# Patient Record
Sex: FEMALE | Race: WHITE | Employment: UNEMPLOYED | URBAN - NONMETROPOLITAN AREA
[De-identification: names, ages, dates, MRNs, and addresses within clinical notes are randomized per-mention and may not be internally consistent; named-entity substitution may affect disease eponyms.]

---

## 2021-09-30 ENCOUNTER — HOSPITAL ENCOUNTER (EMERGENCY)
Age: 56
Discharge: HOME | End: 2021-09-30
Payer: COMMERCIAL

## 2021-09-30 VITALS
RESPIRATION RATE: 14 BRPM | HEART RATE: 67 BPM | OXYGEN SATURATION: 97 % | DIASTOLIC BLOOD PRESSURE: 68 MMHG | TEMPERATURE: 98 F | SYSTOLIC BLOOD PRESSURE: 114 MMHG

## 2021-09-30 DIAGNOSIS — F11.20 UNCOMPLICATED OPIOID DEPENDENCE (HCC): Primary | ICD-10-CM

## 2021-09-30 PROCEDURE — 99282 EMERGENCY DEPT VISIT SF MDM: CPT

## 2021-09-30 NOTE — ED PROVIDER NOTES
past surgical history that includes Tubal ligation (-); hernia repair (-); laparoscopy (?); Wrist surgery; and cyst removal.    CURRENT MEDICATIONS       Discharge Medication List as of 2021  3:13 PM      CONTINUE these medications which have NOT CHANGED    Details   Budesonide-Formoterol Fumarate (SYMBICORT) 80-4.5 MCG/ACT AERO Inhale 2 puffs into the lungs 2 times daily. triamterene-hydrochlorothiazide (MAXZIDE) 75-50 MG per tablet Take 1 tablet by mouth daily. citalopram (CELEXA) 40 MG tablet Take 40 mg by mouth daily. ALLERGIES     has No Known Allergies. FAMILY HISTORY     She indicated that her mother is . She indicated that her father is . family history includes Cancer in her father; Emphysema in her mother; Esophageal Cancer in her father; Heart Disease in her mother. SOCIAL HISTORY       Social History     Socioeconomic History    Marital status:      Spouse name: Not on file    Number of children: Not on file    Years of education: Not on file    Highest education level: Not on file   Occupational History    Not on file   Tobacco Use    Smoking status: Current Every Day Smoker     Packs/day: 1.00   Substance and Sexual Activity    Alcohol use: Yes     Comment: rarely    Drug use: Yes     Types: Opiates      Comment: snorts    Sexual activity: Not on file   Other Topics Concern    Not on file   Social History Narrative    Not on file     Social Determinants of Health     Financial Resource Strain:     Difficulty of Paying Living Expenses:    Food Insecurity:     Worried About Running Out of Food in the Last Year:     [address] in the Last Year:    Transportation Needs:     Lack of Transportation (Medical):      Lack of Transportation (Non-Medical):    Physical Activity:     Days of Exercise per Week:     Minutes of Exercise per Session:    Stress:     Feeling of Stress :    Social Connections:     Frequency of Communication with Friends and Family:     Frequency of Social Gatherings with Friends and Family:     Attends Islam Services:     Active Member of Clubs or Organizations:     Attends Club or Organization Meetings:     Marital Status:    Intimate Partner Violence:     Fear of Current or Ex-Partner:     Emotionally Abused:     Physically Abused:     Sexually Abused:        PHYSICAL EXAM     INITIAL VITALS:  temperature is 98 °F (36.7 °C). Her blood pressure is 114/68 and her pulse is 67. Her respiration is 14 and oxygen saturation is 97%. Physical Exam  Vitals and nursing note reviewed. Constitutional:       Appearance: Normal appearance. She is well-developed. HENT:      Head: Normocephalic. Mouth/Throat:      Pharynx: Uvula midline. Eyes:      Conjunctiva/sclera: Conjunctivae normal.   Cardiovascular:      Rate and Rhythm: Normal rate and regular rhythm. Heart sounds: Normal heart sounds, S1 normal and S2 normal.   Pulmonary:      Effort: Pulmonary effort is normal. No respiratory distress. Breath sounds: Normal breath sounds. Chest:      Chest wall: No tenderness. Abdominal:      General: Bowel sounds are normal. There is no distension. Palpations: Abdomen is soft. Tenderness: There is no abdominal tenderness. Musculoskeletal:         General: Normal range of motion. Cervical back: Normal range of motion and neck supple. Lymphadenopathy:      Cervical: No cervical adenopathy. Skin:     General: Skin is warm and dry. Neurological:      Mental Status: She is alert and oriented to person, place, and time. Psychiatric:         Speech: Speech normal.         Behavior: Behavior normal.         Thought Content:  Thought content normal.         DIFFERENTIAL DIAGNOSIS:   Substance abuse, depression, chronic pain syndrome,      RADIOLOGY: non-plainfilm images(s) such as CT, Ultrasound and MRI are read by the radiologist.  Plain radiographic images are visualized and preliminarily interpreted by the emergency physician unless otherwise stated below. No orders to display         LABS:   Labs Reviewed - No data to display    EMERGENCY DEPARTMENT COURSE:   Vitals:    Vitals:    09/30/21 1416 09/30/21 1516   BP: 117/69 114/68   Pulse: 69 67   Resp: 16 14   Temp: 98.7 °F (37.1 °C) 98 °F (36.7 °C)   TempSrc: Oral    SpO2: 97%          MDM  Patient was seen and evaluated in the emergency department, patient appeared to be in no acute distress, vital signs reviewed, no significant findings noted. Physical exam was completed, no significant findings noted. I did not feel lab work or imaging was needed at this time. Patient's not suicidal, patient does not require inpatient stay. Patient's not under an [facility]. Discussed options for care with the patient and her family, they are willing to follow-up with Suboxone clinic. They are given phone number to call to make appointment. They verbalized understanding plan of care. They are advised to return the emergency department for worsening signs and symptoms. They verbalized understanding. Medications - No data to display    Patient was seenindependently by myself. The patient's final impression and disposition and plan was determined by myself. CRITICAL CARE:   None    CONSULTS:  None    PROCEDURES:  None    FINAL IMPRESSION     1.  Uncomplicated opioid dependence [facility])          DISPOSITION/PLAN   Patient discharged in stable condition  PATIENT REFERREDTO:  Kylie Cohen MD  [address] [place] [address] [id number]  [phone]    Call         DISCHARGE MEDICATIONS:  Discharge Medication List as of 9/30/2021  3:13 PM          (Please note that portions of this note were completed with a voice recognition program.  Efforts were made to edit the dictations but occasionally words are mis-transcribed.)      Provider:  I personally performed the services described in the documentation,reviewed and edited the documentation which was dictated to the scribe in my presence, and it accurately records my words and actions.     Joshua Cherry, CNP 09/30/21 4:25 PM    Harvinder Cherry, APRN - CNP        [employer], APRN - CNP  09/30/21 0273

## 2021-09-30 NOTE — ED NOTES
Lolis Warren to bedside and having extensive conversation with patient and children. Safety plan made for medications. Patient denies current SI and has no hx of wanting to harm herself. Pt given ginger ale. Denies further needs at this time.       James Bosch RN  09/30/21 [id number]

## 2021-09-30 NOTE — PROGRESS NOTES
ED provider requesting OMA to speak with patient about Dr. Lashaun Sommer office and schedule an appointment. Spoke with staff at his office who states to have patient contact Dr. Murphy Hatchet office and then they will have patient do a phone screening with Rashi Arshad who will then consult case with giselle. Spoke with pt who was not interested at this time. Phone number provided. Also provided additional resources. Updated provider.

## 2021-10-04 ENCOUNTER — CLINICAL DOCUMENTATION (OUTPATIENT)
Dept: INTERNAL MEDICINE CLINIC | Age: 56
End: 2021-10-04

## 2021-10-04 NOTE — PROGRESS NOTES
Patient's daughter called christian this morning about her mother. Patient's daughter Lyssa Carter reports that she is needing to schedule or find out what to do next for her mom because her mom will not answer her phone if she is being called to be scheduled. Lyssa Carter shares that she is her mom's care provider and has been now monitoring her mothers medication. Patient reports that her mom is starting to go into withdrawals due to her giving her the correct amount and clearly her mom has been taking more. Christian did explain that there is an office in [facility] though she needed to learn the referral process. Christian spoke to [facility], who shared that the patient needs to see Yaw Espinoza or Dr. Yesy Ansari first then can be referred to the [facility] office. Christian is going to have support staff or Elizabeth SANDERS call and have patient scheduled with Yaw Espinoza for first available appointment.

## 2021-10-06 ENCOUNTER — HOSPITAL ENCOUNTER (OUTPATIENT)
Age: 56
Discharge: HOME | End: 2021-10-06
Payer: COMMERCIAL

## 2021-10-06 ENCOUNTER — HOSPITAL ENCOUNTER (OUTPATIENT)
Dept: GENERAL RADIOLOGY | Age: 56
Discharge: HOME | End: 2021-10-06
Payer: COMMERCIAL

## 2021-10-06 ENCOUNTER — OFFICE VISIT (OUTPATIENT)
Dept: INTERNAL MEDICINE CLINIC | Age: 56
End: 2021-10-06
Payer: COMMERCIAL

## 2021-10-06 VITALS
HEIGHT: 63 IN | TEMPERATURE: 98.3 F | HEART RATE: 79 BPM | BODY MASS INDEX: 18.96 KG/M2 | DIASTOLIC BLOOD PRESSURE: 89 MMHG | WEIGHT: 107 LBS | SYSTOLIC BLOOD PRESSURE: 135 MMHG

## 2021-10-06 DIAGNOSIS — F11.20 SEVERE OPIOID USE DISORDER (HCC): Primary | ICD-10-CM

## 2021-10-06 DIAGNOSIS — M70.60 GREATER TROCHANTERIC BURSITIS, UNSPECIFIED LATERALITY: ICD-10-CM

## 2021-10-06 DIAGNOSIS — F11.20 SEVERE OPIOID USE DISORDER (HCC): ICD-10-CM

## 2021-10-06 DIAGNOSIS — M54.50 LUMBAR PAIN: ICD-10-CM

## 2021-10-06 LAB
ALCOHOL URINE: ABNORMAL
AMPHETAMINE SCREEN, URINE: ABNORMAL
BARBITURATE SCREEN, URINE: ABNORMAL
BENZODIAZEPINE SCREEN, URINE: ABNORMAL
BUPRENORPHINE URINE: ABNORMAL
COCAINE METABOLITE SCREEN URINE: ABNORMAL
FENTANYL SCREEN, URINE: ABNORMAL
GABAPENTIN SCREEN, URINE: ABNORMAL
MDMA URINE: ABNORMAL
METHADONE SCREEN, URINE: ABNORMAL
METHAMPHETAMINE, URINE: ABNORMAL
OPIATE SCREEN URINE: ABNORMAL
OXYCODONE SCREEN URINE: ABNORMAL
PHENCYCLIDINE SCREEN URINE: ABNORMAL
PROPOXYPHENE SCREEN, URINE: ABNORMAL
SYNTHETIC CANNABINOIDS(K2) SCREEN, URINE: ABNORMAL
THC SCREEN, URINE: ABNORMAL
TRAMADOL SCREEN URINE: ABNORMAL
TRICYCLIC ANTIDEPRESSANTS, UR: ABNORMAL

## 2021-10-06 PROCEDURE — 99213 OFFICE O/P EST LOW 20 MIN: CPT | Performed by: NURSE PRACTITIONER

## 2021-10-06 PROCEDURE — 80305 DRUG TEST PRSMV DIR OPT OBS: CPT | Performed by: NURSE PRACTITIONER

## 2021-10-06 PROCEDURE — 80053 COMPREHEN METABOLIC PANEL: CPT

## 2021-10-06 PROCEDURE — 72120 X-RAY BEND ONLY L-S SPINE: CPT

## 2021-10-06 PROCEDURE — 36415 COLL VENOUS BLD VENIPUNCTURE: CPT

## 2021-10-06 RX ORDER — NALOXONE HYDROCHLORIDE 4 MG/.1ML
1 SPRAY NASAL PRN
Qty: 1 EACH | Refills: 1 | Status: SHIPPED | OUTPATIENT
Start: 2021-10-06

## 2021-10-06 RX ORDER — OXYCODONE HYDROCHLORIDE AND ACETAMINOPHEN 5; 325 MG/1; MG/1
1 TABLET ORAL EVERY 4 HOURS PRN
COMMUNITY
End: 2021-10-14

## 2021-10-06 RX ORDER — GABAPENTIN 300 MG/1
300 CAPSULE ORAL 3 TIMES DAILY
COMMUNITY
End: 2022-04-13 | Stop reason: SDUPTHER

## 2021-10-06 NOTE — PROGRESS NOTES
[facility]IC[facility] MANAGEMENT  [address]  Dept: [phone]  Dept Fax: [phone]: [phone]     Visit Date:  10/6/2021    Patient:  Bella West  YOB: 1965    HPI:     Chief Complaint   Patient presents with   [place] New Patient    Drug Problem     Nik Hills presents today for medical evaluation of severe opioid use disorder. I have not seen her previously. Presents today accompanied by her children. States that she has been prescribed opiates by her PCP for several years for chronic pain issues. She has chronic back pain reportedly. Is scheduled to follow up with pain management, and have lumbar xray completed. Is unsure of if she wants to stop her pain pills cold turkey, or wean down off of them. She does realize that she is dependent on them. Feels that she does not even communicate with her family any more. Is tearful. Last pain pill taken around 8 am.     Scheduled to have some dental extractions 10/20    Urine positive for opiates, oxycodone, and THC    Discussed at length all MAT options including buprenorphine, naltrexone, and methadone. Wishes to pursue treatment with buprenorphine at this time. Discussed potential for overdose and/or precipitated withdrawal. Understanding voiced. Medications    Current Outpatient Medications:     gabapentin (NEURONTIN) 300 MG capsule, Take 300 mg by mouth 3 times daily. , Disp: , Rfl:     naloxone 4 MG/0.1ML LIQD nasal spray, 1 spray by Nasal route as needed for Opioid Reversal, Disp: 1 each, Rfl: 1    triamterene-hydrochlorothiazide (MAXZIDE) 75-50 MG per tablet, Take 1 tablet by mouth daily. , Disp: , Rfl:     citalopram (CELEXA) 20 MG tablet, Take 20 mg by mouth daily 1.5 tablet daily, Disp: , Rfl:     buprenorphine-naloxone (SUBOXONE) 8-2 MG FILM SL film, Place 1 Film under the tongue 2 times daily for 7 days. , Disp: 14 Film, Rfl: 0    The patient has No Known Allergies. Past Medical History  Claudine Pavon  has a past medical history of High blood pressure. Past Surgical History  The patient  has a past surgical history that includes Tubal ligation (4-2002); hernia repair (4-2002); laparoscopy (1994?); Wrist surgery; and cyst removal.    Family History  This patient's family history includes Cancer in her father; Emphysema in her mother; Esophageal Cancer in her father; Heart Disease in her mother. Social History  Claudine Pavon  reports that she has been smoking cigarettes. She started smoking about 41 years ago. She has a 40.00 pack-year smoking history. She has never used smokeless tobacco. She reports previous alcohol use. She reports current drug use. Drug: Opiates . Health Maintenance:    Health Maintenance   Topic Date Due    Hepatitis C screen  Never done    Pneumococcal 0-64 years Vaccine (1 of 2 - PPSV23) Never done    HIV screen  Never done    DTaP/Tdap/Td vaccine (1 - Tdap) Never done    Cervical cancer screen  Never done    Lipid screen  Never done    Colon cancer screen colonoscopy  Never done    Shingles Vaccine (1 of 2) Never done    Low dose CT lung screening  Never done    Breast cancer screen  11/21/2013    Flu vaccine (1) Never done    Potassium monitoring  10/06/2022    Creatinine monitoring  10/06/2022    COVID-19 Vaccine  Completed    Hepatitis A vaccine  Aged Out    Hepatitis B vaccine  Aged Out    Hib vaccine  Aged Out    Meningococcal (ACWY) vaccine  Aged Out       Subjective:      Review of Systems   Constitutional: Negative for chills, fatigue and fever. HENT: Positive for dental problem (pain). Negative for congestion, rhinorrhea, sinus pressure, sinus pain, sore throat, tinnitus and trouble swallowing. Eyes: Negative for photophobia and visual disturbance. Respiratory: Negative for cough, shortness of breath and wheezing.     Cardiovascular: Negative for chest pain, palpitations and Thought content normal.         Judgment: Judgment normal.         Labs Reviewed 10/6/2021:    Lab Results   Component Value Date    ALT 17 10/06/2021    AST 18 10/06/2021     10/06/2021    K 3.6 10/06/2021     10/06/2021    CREATININE 1.2 10/06/2021    BUN 27 (H) 10/06/2021    CO2 24 10/06/2021       Assessment/Plan      1. Severe opioid use disorder (HCC)    - POCT Rapid Drug Screen  - naloxone 4 MG/0.1ML LIQD nasal spray; 1 spray by Nasal route as needed for Opioid Reversal  Dispense: 1 each; Refill: 1  - HIV-1 and HIV-2 Antibodies; Future  - Hepatitis C Antibody; Future  - Comprehensive Metabolic Panel; Future  - In home induction discussed at length with patient and daughter. Her daughter is a RN. Understanding voiced. - Sent home with Suboxone 8 mg film x1.   - Encouraged to attend NA/AA meetings and/or arrange for individualized counseling services. Return in about 1 day (around 10/7/2021). Patient given educational materials - see patient instructions. Discussed use, benefit, and side effects of prescribed medications. All patient questions answered. Pt voiced understanding. Reviewed health maintenance.        Electronically signed ENMANUEL Marc - CNP on 10/6/21 at 11:04 AM EDT

## 2021-10-06 NOTE — PROGRESS NOTES
Verbal order per Kamran List CNP for urine drug screen. Positive for MOP,OXY,THC. [facility] patient Suboxone 8 mg film qty 2 to be sent home with patient from out of stock. Verified with patient. Patient was sent home with Suboxone 8 mg film qty 2 from out of stock and will be seen back in office on 10/7/21.

## 2021-10-07 ENCOUNTER — OFFICE VISIT (OUTPATIENT)
Dept: INTERNAL MEDICINE CLINIC | Age: 56
End: 2021-10-07
Payer: COMMERCIAL

## 2021-10-07 VITALS
BODY MASS INDEX: 18.89 KG/M2 | SYSTOLIC BLOOD PRESSURE: 136 MMHG | WEIGHT: 106.6 LBS | DIASTOLIC BLOOD PRESSURE: 81 MMHG | HEART RATE: 68 BPM | HEIGHT: 63 IN | TEMPERATURE: 98.1 F

## 2021-10-07 DIAGNOSIS — F11.20 SEVERE OPIOID USE DISORDER (HCC): Primary | ICD-10-CM

## 2021-10-07 LAB
ALBUMIN SERPL-MCNC: 4.5 G/DL (ref 3.5–5.1)
ALCOHOL URINE: ABNORMAL
ALP BLD-CCNC: 61 U/L (ref 38–126)
ALT SERPL-CCNC: 17 U/L (ref 11–66)
AMPHETAMINE SCREEN, URINE: ABNORMAL
ANION GAP SERPL CALCULATED.3IONS-SCNC: 14 MEQ/L (ref 8–16)
AST SERPL-CCNC: 18 U/L (ref 5–40)
BARBITURATE SCREEN, URINE: ABNORMAL
BENZODIAZEPINE SCREEN, URINE: ABNORMAL
BILIRUB SERPL-MCNC: 0.2 MG/DL (ref 0.3–1.2)
BUN BLDV-MCNC: 27 MG/DL (ref 7–22)
BUPRENORPHINE URINE: ABNORMAL
CALCIUM SERPL-MCNC: 9.3 MG/DL (ref 8.5–10.5)
CHLORIDE BLD-SCNC: 103 MEQ/L (ref 98–111)
CO2: 24 MEQ/L (ref 23–33)
COCAINE METABOLITE SCREEN URINE: ABNORMAL
CREAT SERPL-MCNC: 1.2 MG/DL (ref 0.4–1.2)
FENTANYL SCREEN, URINE: ABNORMAL
GABAPENTIN SCREEN, URINE: ABNORMAL
GFR SERPL CREATININE-BSD FRML MDRD: 46 ML/MIN/1.73M2
GLUCOSE BLD-MCNC: 126 MG/DL (ref 70–108)
MDMA URINE: ABNORMAL
METHADONE SCREEN, URINE: ABNORMAL
METHAMPHETAMINE, URINE: ABNORMAL
OPIATE SCREEN URINE: ABNORMAL
OXYCODONE SCREEN URINE: ABNORMAL
PHENCYCLIDINE SCREEN URINE: ABNORMAL
POTASSIUM SERPL-SCNC: 3.6 MEQ/L (ref 3.5–5.2)
PROPOXYPHENE SCREEN, URINE: ABNORMAL
SODIUM BLD-SCNC: 141 MEQ/L (ref 135–145)
SYNTHETIC CANNABINOIDS(K2) SCREEN, URINE: ABNORMAL
THC SCREEN, URINE: ABNORMAL
TOTAL PROTEIN: 6.6 G/DL (ref 6.1–8)
TRAMADOL SCREEN URINE: ABNORMAL
TRICYCLIC ANTIDEPRESSANTS, UR: ABNORMAL

## 2021-10-07 PROCEDURE — 99213 OFFICE O/P EST LOW 20 MIN: CPT | Performed by: NURSE PRACTITIONER

## 2021-10-07 PROCEDURE — 80305 DRUG TEST PRSMV DIR OPT OBS: CPT | Performed by: NURSE PRACTITIONER

## 2021-10-07 RX ORDER — BUPRENORPHINE AND NALOXONE 8; 2 MG/1; MG/1
1 FILM, SOLUBLE BUCCAL; SUBLINGUAL 2 TIMES DAILY
Qty: 14 FILM | Refills: 0 | Status: SHIPPED | OUTPATIENT
Start: 2021-10-07 | End: 2021-10-14 | Stop reason: SDUPTHER

## 2021-10-07 ASSESSMENT — PATIENT HEALTH QUESTIONNAIRE - PHQ9
SUM OF ALL RESPONSES TO PHQ9 QUESTIONS 1 & 2: 0
SUM OF ALL RESPONSES TO PHQ QUESTIONS 1-9: 0
2. FEELING DOWN, DEPRESSED OR HOPELESS: 0
1. LITTLE INTEREST OR PLEASURE IN DOING THINGS: 0
SUM OF ALL RESPONSES TO PHQ QUESTIONS 1-9: 0
SUM OF ALL RESPONSES TO PHQ QUESTIONS 1-9: 0

## 2021-10-07 NOTE — PROGRESS NOTES
[facility]NAGEMENT  [address]  Dept: [phone]  Dept Fax: [phone]: [phone]     Visit Date:  10/7/2021    Patient:  Jia Marion  YOB: 1965    HPI:     Chief Complaint   Patient presents with    Drug Problem     Ricci العلي presents today for medical evaluation of severe opioid use disorder. I last seen her yesterday, MAT initiated at that time. She started her suboxone this am at 0830. Took the entire 8 mg film. Last opiate at 2:00 pm yesterday. Daughter says that it was hard to arouse her this morning. She took 150 mg of trazodone last night. Urine positive for buprenorphine, opiates, and THC. Medications    Current Outpatient Medications:     gabapentin (NEURONTIN) 300 MG capsule, Take 300 mg by mouth 3 times daily. , Disp: , Rfl:     naloxone 4 MG/0.1ML LIQD nasal spray, 1 spray by Nasal route as needed for Opioid Reversal, Disp: 1 each, Rfl: 1    triamterene-hydrochlorothiazide (MAXZIDE) 75-50 MG per tablet, Take 1 tablet by mouth daily. , Disp: , Rfl:     citalopram (CELEXA) 20 MG tablet, Take 20 mg by mouth daily 1.5 tablet daily, Disp: , Rfl:     buprenorphine-naloxone (SUBOXONE) 8-2 MG FILM SL film, Place 1 Film under the tongue 2 times daily for 14 days. , Disp: 28 Film, Rfl: 0    ondansetron (ZOFRAN) 4 MG tablet, Take 1 tablet by mouth 3 times daily as needed for Nausea or Vomiting, Disp: 15 tablet, Rfl: 0    The patient has No Known Allergies. Past Medical History  Ricci العلي  has a past medical history of High blood pressure. Past Surgical History  The patient  has a past surgical history that includes Tubal ligation (4-2002); hernia repair (4-2002); laparoscopy (1994?);  Wrist surgery; and cyst removal.    Family History  This patient's family history includes Cancer in her father; Emphysema in her mother; Esophageal Cancer in her Negative for dizziness, light-headedness and headaches. Psychiatric/Behavioral: Negative for dysphoric mood and sleep disturbance. The patient is not nervous/anxious. Objective:     /81 (Site: Right Upper Arm, Position: Sitting, Cuff Size: Medium Adult)   Pulse 68   Temp 98.1 °F (36.7 °C) (Temporal)   Ht 5' 3\" (1.6 m)   Wt 106 lb 9.6 oz (48.4 kg)   BMI 18.88 kg/m²     Physical Exam  Vitals reviewed. Constitutional:       General: She is not in acute distress. Appearance: Normal appearance. She is not ill-appearing. HENT:      Head: Normocephalic and atraumatic. Right Ear: External ear normal.      Left Ear: External ear normal.      Nose: Nose normal. No congestion or rhinorrhea. Mouth/Throat:      Mouth: Mucous membranes are moist.   Eyes:      Extraocular Movements: Extraocular movements intact. Conjunctiva/sclera: Conjunctivae normal.      Pupils: Pupils are equal, round, and reactive to light. Pulmonary:      Effort: Pulmonary effort is normal. No respiratory distress. Musculoskeletal:         General: Normal range of motion. Cervical back: Normal range of motion and neck supple. Right lower leg: No edema. Left lower leg: No edema. Skin:     General: Skin is warm and dry. Neurological:      General: No focal deficit present. Mental Status: She is alert and oriented to person, place, and time. Psychiatric:         Mood and Affect: Mood normal.         Behavior: Behavior normal.         Thought Content: Thought content normal.         Judgment: Judgment normal.         Labs Reviewed 10/7/2021:    Lab Results   Component Value Date    ALT 17 10/06/2021    AST 18 10/06/2021     10/06/2021    K 3.6 10/06/2021     10/06/2021    CREATININE 1.2 10/06/2021    BUN 27 (H) 10/06/2021    CO2 24 10/06/2021       Assessment/Plan      1.  Severe opioid use disorder (HCC)    - POCT Rapid Drug Screen  - Suboxone 8 mg BID  - Narcan at home  - Labs not yet obtained  - OARRS reviewed, no discrepancies  - Encouraged to attend NA/AA meetings andd/or arrange for individualized counseling      Return in about 1 week (around 10/14/2021). Patient given educational materials - see patient instructions. Discussed use, benefit, and side effects of prescribed medications. All patient questions answered. Pt voiced understanding. Reviewed health maintenance.        Electronically signed ENMANUEL Romano - CNP on 10/7/21 at 2:51 PM EDT

## 2021-10-14 ENCOUNTER — OFFICE VISIT (OUTPATIENT)
Dept: INTERNAL MEDICINE CLINIC | Age: 56
End: 2021-10-14
Payer: COMMERCIAL

## 2021-10-14 VITALS
BODY MASS INDEX: 18.78 KG/M2 | WEIGHT: 106 LBS | SYSTOLIC BLOOD PRESSURE: 138 MMHG | HEIGHT: 63 IN | HEART RATE: 69 BPM | DIASTOLIC BLOOD PRESSURE: 78 MMHG | TEMPERATURE: 97.9 F

## 2021-10-14 DIAGNOSIS — F11.20 SEVERE OPIOID USE DISORDER (HCC): Primary | ICD-10-CM

## 2021-10-14 DIAGNOSIS — R30.0 DYSURIA: ICD-10-CM

## 2021-10-14 PROCEDURE — 80305 DRUG TEST PRSMV DIR OPT OBS: CPT | Performed by: NURSE PRACTITIONER

## 2021-10-14 PROCEDURE — 99214 OFFICE O/P EST MOD 30 MIN: CPT | Performed by: NURSE PRACTITIONER

## 2021-10-14 RX ORDER — BUPRENORPHINE AND NALOXONE 8; 2 MG/1; MG/1
1 FILM, SOLUBLE BUCCAL; SUBLINGUAL 2 TIMES DAILY
Qty: 14 FILM | Refills: 0 | Status: SHIPPED | OUTPATIENT
Start: 2021-10-14 | End: 2021-10-21 | Stop reason: SDUPTHER

## 2021-10-14 NOTE — PROGRESS NOTES
[facility]ICATION MANAGEMENT  [address]  Dept: [phone]  Dept Fax: [phone]: [phone]     Visit Date:  10/14/2021    Patient:  Kevin Meyers  YOB: 1965    HPI:     Chief Complaint   Patient presents with    Drug Problem     Renee Schwartz presents today for medical evaluation of severe opioid use disorder    I last seen her 1 week ago. She is not currently feeling well. Complains of nausea and fatigue. Her son has Justina, lives in her home; however she states that he has been in the basement since his symptoms began. Urine positive for buprenorphine and THC     Also complains of urinary retention and dysuria x3 days. Will send for UA. Medications    Current Outpatient Medications:     gabapentin (NEURONTIN) 300 MG capsule, Take 300 mg by mouth 3 times daily. , Disp: , Rfl:     naloxone 4 MG/0.1ML LIQD nasal spray, 1 spray by Nasal route as needed for Opioid Reversal, Disp: 1 each, Rfl: 1    triamterene-hydrochlorothiazide (MAXZIDE) 75-50 MG per tablet, Take 1 tablet by mouth daily. , Disp: , Rfl:     citalopram (CELEXA) 20 MG tablet, Take 20 mg by mouth daily 1.5 tablet daily, Disp: , Rfl:     buprenorphine-naloxone (SUBOXONE) 8-2 MG FILM SL film, Place 1 Film under the tongue 2 times daily for 14 days. , Disp: 28 Film, Rfl: 0    ondansetron (ZOFRAN) 4 MG tablet, Take 1 tablet by mouth 3 times daily as needed for Nausea or Vomiting, Disp: 15 tablet, Rfl: 0    The patient has No Known Allergies. Past Medical History  Renee Schwartz  has a past medical history of High blood pressure. Past Surgical History  The patient  has a past surgical history that includes Tubal ligation (4-2002); hernia repair (4-2002); laparoscopy (1994?);  Wrist surgery; and cyst removal.    Family History  This patient's family history includes Cancer in her father; Emphysema in her mother; Esophageal Cancer in her father; Heart Disease in her mother. Social History  Loyd Espitia  reports that she has been smoking cigarettes. She started smoking about 41 years ago. She has a 40.00 pack-year smoking history. She has never used smokeless tobacco. She reports previous alcohol use. She reports current drug use. Drug: Opiates . Health Maintenance:    Health Maintenance   Topic Date Due    Hepatitis C screen  Never done    Pneumococcal 0-64 years Vaccine (1 of 2 - PPSV23) Never done    HIV screen  Never done    DTaP/Tdap/Td vaccine (1 - Tdap) Never done    Cervical cancer screen  Never done    Lipid screen  Never done    Colon cancer screen colonoscopy  Never done    Shingles Vaccine (1 of 2) Never done    Low dose CT lung screening  Never done    Breast cancer screen  11/21/2013    Flu vaccine (1) Never done    Potassium monitoring  10/06/2022    Creatinine monitoring  10/06/2022    COVID-19 Vaccine  Completed    Hepatitis A vaccine  Aged Out    Hepatitis B vaccine  Aged Out    Hib vaccine  Aged Out    Meningococcal (ACWY) vaccine  Aged Out       Subjective:      Review of Systems   Constitutional: Negative for chills, fatigue and fever. HENT: Positive for dental problem (pain). Negative for congestion, rhinorrhea, sinus pressure, sinus pain, sore throat, tinnitus and trouble swallowing. Eyes: Negative for photophobia and visual disturbance. Respiratory: Negative for cough, shortness of breath and wheezing. Cardiovascular: Negative for chest pain, palpitations and leg swelling. Gastrointestinal: Negative for abdominal distention, abdominal pain, blood in stool, constipation, diarrhea, nausea and vomiting. Endocrine: Negative for polydipsia, polyphagia and polyuria. Genitourinary: Positive for dysuria. Negative for difficulty urinating, frequency, hematuria and urgency. Musculoskeletal: Positive for back pain (chronic). Negative for arthralgias and myalgias.    Skin: Negative for rash and wound. Neurological: Negative for dizziness, light-headedness and headaches. Psychiatric/Behavioral: Negative for dysphoric mood and sleep disturbance. The patient is not nervous/anxious. Objective:     /78 (Site: Left Lower Arm, Position: Sitting, Cuff Size: Medium Adult)   Pulse 69   Temp 97.9 °F (36.6 °C) (Temporal)   Ht 5' 3\" (1.6 m)   Wt 106 lb (48.1 kg)   BMI 18.78 kg/m²     Physical Exam  Vitals reviewed. Constitutional:       General: She is not in acute distress. Appearance: Normal appearance. She is not ill-appearing. HENT:      Head: Normocephalic and atraumatic. Right Ear: External ear normal.      Left Ear: External ear normal.      Nose: Nose normal. No congestion or rhinorrhea. Mouth/Throat:      Mouth: Mucous membranes are moist.   Eyes:      Extraocular Movements: Extraocular movements intact. Conjunctiva/sclera: Conjunctivae normal.      Pupils: Pupils are equal, round, and reactive to light. Pulmonary:      Effort: Pulmonary effort is normal. No respiratory distress. Musculoskeletal:         General: Normal range of motion. Cervical back: Normal range of motion and neck supple. Right lower leg: No edema. Left lower leg: No edema. Skin:     General: Skin is warm and dry. Neurological:      General: No focal deficit present. Mental Status: She is alert and oriented to person, place, and time. Psychiatric:         Mood and Affect: Mood normal.         Behavior: Behavior normal.         Thought Content: Thought content normal.         Judgment: Judgment normal.         Labs Reviewed 10/14/2021:    Lab Results   Component Value Date    ALT 17 10/06/2021    AST 18 10/06/2021     10/06/2021    K 3.6 10/06/2021     10/06/2021    CREATININE 1.2 10/06/2021    BUN 27 (H) 10/06/2021    CO2 24 10/06/2021       Assessment/Plan      1.  Severe opioid use disorder (HCC)    - POCT Rapid Drug Screen  - Continue Suboxone 8 mg BID  - Narcan at home  - Labs not yet obtained  - OARRS reviewed, no discrepancies  - Encouraged to attend NA/AA meetings andd/or arrange for individualized counseling    2. Dysuria    - Urinalysis Reflex to Culture; Future      Return in about 1 week (around 10/21/2021). Patient given educational materials - see patient instructions. Discussed use, benefit, and side effects of prescribed medications. All patient questions answered. Pt voiced understanding. Reviewed health maintenance.        Electronically signed ENMANUEL Vásquez CNP on 10/14/21 at 1:15 PM EDT

## 2021-10-20 ASSESSMENT — ENCOUNTER SYMPTOMS
SORE THROAT: 0
ABDOMINAL DISTENTION: 0
SINUS PAIN: 0
DIARRHEA: 0
NAUSEA: 0
TROUBLE SWALLOWING: 0
RHINORRHEA: 0
PHOTOPHOBIA: 0
CONSTIPATION: 0
SHORTNESS OF BREATH: 0
VOMITING: 0
COUGH: 0
SINUS PRESSURE: 0
BACK PAIN: 1
BLOOD IN STOOL: 0
ABDOMINAL PAIN: 0
WHEEZING: 0

## 2021-10-21 ENCOUNTER — VIRTUAL VISIT (OUTPATIENT)
Dept: INTERNAL MEDICINE CLINIC | Age: 56
End: 2021-10-21
Payer: COMMERCIAL

## 2021-10-21 DIAGNOSIS — R11.0 NAUSEA: ICD-10-CM

## 2021-10-21 DIAGNOSIS — F11.20 SEVERE OPIOID USE DISORDER (HCC): Primary | ICD-10-CM

## 2021-10-21 PROCEDURE — 99213 OFFICE O/P EST LOW 20 MIN: CPT | Performed by: NURSE PRACTITIONER

## 2021-10-21 RX ORDER — ONDANSETRON 4 MG/1
4 TABLET, FILM COATED ORAL 3 TIMES DAILY PRN
Qty: 15 TABLET | Refills: 0 | Status: SHIPPED | OUTPATIENT
Start: 2021-10-21 | End: 2021-11-04 | Stop reason: SDUPTHER

## 2021-10-21 RX ORDER — BUPRENORPHINE AND NALOXONE 8; 2 MG/1; MG/1
1 FILM, SOLUBLE BUCCAL; SUBLINGUAL 2 TIMES DAILY
Qty: 28 FILM | Refills: 0 | Status: SHIPPED | OUTPATIENT
Start: 2021-10-21 | End: 2021-11-04 | Stop reason: SDUPTHER

## 2021-10-21 RX ORDER — BUPRENORPHINE AND NALOXONE 8; 2 MG/1; MG/1
1 FILM, SOLUBLE BUCCAL; SUBLINGUAL 2 TIMES DAILY
Qty: 14 FILM | Refills: 0 | Status: CANCELLED | OUTPATIENT
Start: 2021-10-21 | End: 2021-10-28

## 2021-10-21 NOTE — PROGRESS NOTES
[facility] INTERNAL MEDICINE AND MEDICATION MANAGEMENT  [address]  Dept: [phone]  Dept Fax: [phone]: [phone]     Visit Date:  10/21/2021    Patient:  Mandy Prabhakar  YOB: 1965    HPI:     Chief Complaint   Patient presents with    Drug Problem     Bill Goldman presents today for medical evaluation of severe opioid use disorder and nausea     I last seen her 1 week ago. Was scheduled today for a virtual visit but she did not understand how it worked, so she came into the office.     She is tearful. In pain, had multiple dental extractions yesterday. Is unable to urinate. Denies any use. States that she found a bottle of pain pills and did not take any, gave them to her  to dispose of. Is nauseated. Urges and cravings controlled with Suboxone 8 mg BID    PCP has discharged patient from practice    Stopped her trazodone, states it leaves her feeling too tired and groggy the following day. Medications    Current Outpatient Medications:     gabapentin (NEURONTIN) 300 MG capsule, Take 300 mg by mouth 3 times daily. , Disp: , Rfl:     naloxone 4 MG/0.1ML LIQD nasal spray, 1 spray by Nasal route as needed for Opioid Reversal, Disp: 1 each, Rfl: 1    triamterene-hydrochlorothiazide (MAXZIDE) 75-50 MG per tablet, Take 1 tablet by mouth daily. , Disp: , Rfl:     citalopram (CELEXA) 20 MG tablet, Take 20 mg by mouth daily 1.5 tablet daily, Disp: , Rfl:     buprenorphine-naloxone (SUBOXONE) 8-2 MG FILM SL film, Place 1 Film under the tongue 2 times daily for 14 days. , Disp: 28 Film, Rfl: 0    ondansetron (ZOFRAN) 4 MG tablet, Take 1 tablet by mouth 3 times daily as needed for Nausea or Vomiting, Disp: 30 tablet, Rfl: 0    The patient has No Known Allergies. Past Medical History  Bill Jones  has a past medical history of High blood pressure.     Past Surgical History  The patient  has a past surgical history that includes Tubal ligation (4-2002); hernia repair (4-2002); laparoscopy (1994?); Wrist surgery; and cyst removal.    Family History  This patient's family history includes Cancer in her father; Emphysema in her mother; Esophageal Cancer in her father; Heart Disease in her mother. Social History  Charolet Hamman  reports that she has been smoking cigarettes. She started smoking about 41 years ago. She has a 40.00 pack-year smoking history. She has never used smokeless tobacco. She reports previous alcohol use. She reports current drug use. Drug: Opiates . Health Maintenance:    Health Maintenance   Topic Date Due    Hepatitis C screen  Never done    Pneumococcal 0-64 years Vaccine (1 of 2 - PPSV23) Never done    COVID-19 Vaccine (1) Never done    HIV screen  Never done    DTaP/Tdap/Td vaccine (1 - Tdap) Never done    Cervical cancer screen  Never done    Lipid screen  Never done    Colon cancer screen colonoscopy  Never done    Shingles Vaccine (1 of 2) Never done    Low dose CT lung screening  Never done    Breast cancer screen  11/21/2013    Flu vaccine (1) Never done    Potassium monitoring  10/06/2022    Creatinine monitoring  10/06/2022    Hepatitis A vaccine  Aged Out    Hepatitis B vaccine  Aged Out    Hib vaccine  Aged Out    Meningococcal (ACWY) vaccine  Aged Out       Subjective:      Review of Systems   Constitutional: Negative for chills, fatigue and fever. HENT: Positive for dental problem (pain). Negative for congestion, rhinorrhea, sinus pressure, sinus pain, sore throat, tinnitus and trouble swallowing. Eyes: Negative for photophobia and visual disturbance. Respiratory: Negative for cough, shortness of breath and wheezing. Cardiovascular: Negative for chest pain, palpitations and leg swelling. Gastrointestinal: Negative for abdominal distention, abdominal pain, blood in stool, constipation, diarrhea, nausea and vomiting.    Endocrine: Negative for polydipsia, polyphagia and polyuria. Genitourinary: Negative for difficulty urinating, dysuria, frequency, hematuria and urgency. Musculoskeletal: Positive for back pain (chronic). Negative for arthralgias and myalgias. Skin: Negative for rash and wound. Neurological: Negative for dizziness, light-headedness and headaches. Psychiatric/Behavioral: Negative for dysphoric mood and sleep disturbance. The patient is not nervous/anxious. Objective: There were no vitals taken for this visit. Physical Exam  Vitals reviewed. Constitutional:       General: She is not in acute distress. Appearance: Normal appearance. She is not ill-appearing. HENT:      Head: Normocephalic and atraumatic. Right Ear: External ear normal.      Left Ear: External ear normal.      Nose: Nose normal. No congestion or rhinorrhea. Mouth/Throat:      Mouth: Mucous membranes are moist.   Eyes:      Extraocular Movements: Extraocular movements intact. Conjunctiva/sclera: Conjunctivae normal.      Pupils: Pupils are equal, round, and reactive to light. Pulmonary:      Effort: Pulmonary effort is normal. No respiratory distress. Musculoskeletal:         General: Normal range of motion. Cervical back: Normal range of motion and neck supple. Right lower leg: No edema. Left lower leg: No edema. Skin:     General: Skin is warm and dry. Neurological:      General: No focal deficit present. Mental Status: She is alert and oriented to person, place, and time. Psychiatric:         Mood and Affect: Mood normal. Affect is tearful. Behavior: Behavior normal.         Thought Content:  Thought content normal.         Judgment: Judgment normal.         Labs Reviewed 10/21/2021:    Lab Results   Component Value Date    ALT 17 10/06/2021    AST 18 10/06/2021     10/06/2021    K 3.6 10/06/2021     10/06/2021    CREATININE 1.2 10/06/2021    BUN 27 (H) 10/06/2021    CO2 24 10/06/2021       Assessment/Plan      1. Severe opioid use disorder (HCC)    - Continue Suboxone 8 mg BID  - Narcan at home  - Labs not yet obtained  - OARRS reviewed, no discrepancies  - Encouraged to attend NA/AA meetings andd/or arrange for individualized counseling    2. Nausea    - Zofran 4 mg TID PRN       Return in about 2 weeks (around 11/4/2021). Patient given educational materials - see patient instructions. Discussed use, benefit, and side effects of prescribed medications. All patient questions answered. Pt voiced understanding. Reviewed health maintenance.        Electronically signed ENMANUEL Anne - CNP on 11/11/21 at 10:20 AM EST

## 2021-10-25 ASSESSMENT — ENCOUNTER SYMPTOMS
BACK PAIN: 1
SHORTNESS OF BREATH: 0
WHEEZING: 0
SORE THROAT: 0
VOMITING: 0
ABDOMINAL PAIN: 0
ABDOMINAL DISTENTION: 0
DIARRHEA: 0
SINUS PAIN: 0
CONSTIPATION: 0
BLOOD IN STOOL: 0
COUGH: 0
NAUSEA: 0
TROUBLE SWALLOWING: 0
PHOTOPHOBIA: 0
RHINORRHEA: 0
SINUS PRESSURE: 0

## 2021-10-31 ASSESSMENT — ENCOUNTER SYMPTOMS
ABDOMINAL PAIN: 0
DIARRHEA: 0
BACK PAIN: 1
SINUS PRESSURE: 0
NAUSEA: 0
WHEEZING: 0
CONSTIPATION: 0
TROUBLE SWALLOWING: 0
VOMITING: 0
SHORTNESS OF BREATH: 0
ABDOMINAL DISTENTION: 0
BLOOD IN STOOL: 0
RHINORRHEA: 0
PHOTOPHOBIA: 0
COUGH: 0
SINUS PAIN: 0
SORE THROAT: 0

## 2021-11-04 ENCOUNTER — OFFICE VISIT (OUTPATIENT)
Dept: INTERNAL MEDICINE CLINIC | Age: 56
End: 2021-11-04
Payer: COMMERCIAL

## 2021-11-04 VITALS
DIASTOLIC BLOOD PRESSURE: 60 MMHG | BODY MASS INDEX: 18.25 KG/M2 | HEIGHT: 63 IN | SYSTOLIC BLOOD PRESSURE: 119 MMHG | WEIGHT: 103 LBS | HEART RATE: 79 BPM

## 2021-11-04 DIAGNOSIS — F11.20 SEVERE OPIOID USE DISORDER (HCC): Primary | ICD-10-CM

## 2021-11-04 DIAGNOSIS — R53.83 FATIGUE, UNSPECIFIED TYPE: ICD-10-CM

## 2021-11-04 PROCEDURE — 80305 DRUG TEST PRSMV DIR OPT OBS: CPT | Performed by: NURSE PRACTITIONER

## 2021-11-04 PROCEDURE — 99214 OFFICE O/P EST MOD 30 MIN: CPT | Performed by: NURSE PRACTITIONER

## 2021-11-04 RX ORDER — BUPRENORPHINE AND NALOXONE 8; 2 MG/1; MG/1
1 FILM, SOLUBLE BUCCAL; SUBLINGUAL 2 TIMES DAILY
Qty: 28 FILM | Refills: 0 | Status: SHIPPED | OUTPATIENT
Start: 2021-11-04 | End: 2021-11-17 | Stop reason: SDUPTHER

## 2021-11-04 RX ORDER — ONDANSETRON 4 MG/1
4 TABLET, FILM COATED ORAL 3 TIMES DAILY PRN
Qty: 30 TABLET | Refills: 0 | Status: SHIPPED | OUTPATIENT
Start: 2021-11-04 | End: 2021-12-09

## 2021-11-04 NOTE — PROGRESS NOTES
[facility] INTERNAL MEDICINE AND MEDICATION MANAGEMENT  [address]  Dept: [phone]  Dept Fax: [phone]: [phone]     Visit Date:  11/4/2021    Patient:  Joi Toro  YOB: 1965    HPI:     Chief Complaint   Patient presents with    Drug Problem     Rosita Pearson presents today for medical evaluation of severe opioid use disorder and fatigue     I last seen her 2 weeks ago. Denies any use.      Urges and cravings controlled with Suboxone 8 mg BID     PCP has discharged patient from practice     Stopped her trazodone, states it leaves her feeling too tired and groggy the following day. She continues to have significant fatigue. States that she was diagnosed with COPD > 10 years ago. She has not had repeat PFTs or sleep study. Medications    Current Outpatient Medications:     ondansetron (ZOFRAN) 4 MG tablet, Take 1 tablet by mouth 3 times daily as needed for Nausea or Vomiting, Disp: 30 tablet, Rfl: 0    gabapentin (NEURONTIN) 300 MG capsule, Take 300 mg by mouth 3 times daily. , Disp: , Rfl:     naloxone 4 MG/0.1ML LIQD nasal spray, 1 spray by Nasal route as needed for Opioid Reversal, Disp: 1 each, Rfl: 1    triamterene-hydrochlorothiazide (MAXZIDE) 75-50 MG per tablet, Take 1 tablet by mouth daily. , Disp: , Rfl:     buprenorphine-naloxone (SUBOXONE) 8-2 MG FILM SL film, Place 1 Film under the tongue 2 times daily for 14 days. , Disp: 28 Film, Rfl: 0    citalopram (CELEXA) 40 MG tablet, Take 1 tablet by mouth daily, Disp: 30 tablet, Rfl: 0    The patient has No Known Allergies. Past Medical History  Rosita Pearson  has a past medical history of High blood pressure. Past Surgical History  The patient  has a past surgical history that includes Tubal ligation (4-2002); hernia repair (4-2002); laparoscopy (1994?);  Wrist surgery; and cyst removal.    Family History  This patient's family history includes Cancer in her father; Emphysema in her mother; Esophageal Cancer in her father; Heart Disease in her mother. Social History  Ricci العلي  reports that she has been smoking cigarettes. She started smoking about 41 years ago. She has a 40.00 pack-year smoking history. She has never used smokeless tobacco. She reports previous alcohol use. She reports current drug use. Drug: Opiates . Health Maintenance:    Health Maintenance   Topic Date Due    Hepatitis C screen  Never done    Pneumococcal 0-64 years Vaccine (1 of 2 - PPSV23) Never done    COVID-19 Vaccine (1) Never done    HIV screen  Never done    DTaP/Tdap/Td vaccine (1 - Tdap) Never done    Cervical cancer screen  Never done    Lipid screen  Never done    Colon cancer screen colonoscopy  Never done    Shingles Vaccine (1 of 2) Never done    Low dose CT lung screening  Never done    Breast cancer screen  11/21/2013    Flu vaccine (1) Never done    Potassium monitoring  10/06/2022    Creatinine monitoring  10/06/2022    Hepatitis A vaccine  Aged Out    Hepatitis B vaccine  Aged Out    Hib vaccine  Aged Out    Meningococcal (ACWY) vaccine  Aged Out       Subjective:      Review of Systems   Constitutional: Positive for fatigue. Negative for chills and fever. HENT: Negative for congestion, dental problem, rhinorrhea, sinus pressure, sinus pain, sore throat, tinnitus and trouble swallowing. Eyes: Negative for photophobia and visual disturbance. Respiratory: Positive for shortness of breath (on exertion). Negative for cough and wheezing. Cardiovascular: Negative for chest pain, palpitations and leg swelling. Gastrointestinal: Negative for abdominal distention, abdominal pain, blood in stool, constipation, diarrhea, nausea and vomiting. Endocrine: Negative for polydipsia, polyphagia and polyuria. Genitourinary: Negative for difficulty urinating, dysuria, frequency, hematuria and urgency.    Musculoskeletal: Positive for back pain (chronic). Negative for arthralgias and myalgias. Skin: Negative for rash and wound. Neurological: Negative for dizziness, light-headedness and headaches. Psychiatric/Behavioral: Negative for dysphoric mood and sleep disturbance. The patient is not nervous/anxious. Objective:     /60 (Site: Left Lower Arm, Position: Sitting, Cuff Size: Medium Adult)   Pulse 79   Ht 5' 3\" (1.6 m)   Wt 103 lb (46.7 kg)   BMI 18.25 kg/m²     Physical Exam  Vitals reviewed. Constitutional:       General: She is not in acute distress. Appearance: Normal appearance. She is not ill-appearing. HENT:      Head: Normocephalic and atraumatic. Right Ear: External ear normal.      Left Ear: External ear normal.      Nose: Nose normal. No congestion or rhinorrhea. Mouth/Throat:      Mouth: Mucous membranes are moist.   Eyes:      Extraocular Movements: Extraocular movements intact. Conjunctiva/sclera: Conjunctivae normal.      Pupils: Pupils are equal, round, and reactive to light. Pulmonary:      Effort: Pulmonary effort is normal. No respiratory distress. Musculoskeletal:         General: Normal range of motion. Cervical back: Normal range of motion and neck supple. Right lower leg: No edema. Left lower leg: No edema. Skin:     General: Skin is warm and dry. Neurological:      General: No focal deficit present. Mental Status: She is alert and oriented to person, place, and time. Psychiatric:         Mood and Affect: Mood normal.         Behavior: Behavior normal.         Thought Content: Thought content normal.         Judgment: Judgment normal.         Labs Reviewed 11/4/2021:    Lab Results   Component Value Date    ALT 17 10/06/2021    AST 18 10/06/2021     10/06/2021    K 3.6 10/06/2021     10/06/2021    CREATININE 1.2 10/06/2021    BUN 27 (H) 10/06/2021    CO2 24 10/06/2021       Assessment/Plan      1.  Severe opioid use disorder (HCC)    - POCT Rapid Drug Screen  - Continue Suboxone 8 mg BID  - Narcan at home  - Labs not yet obtained  - OARRS reviewed, no discrepancies  - Encouraged to attend NA/AA meetings and/or arrange for individualized counseling    2. Fatigue, unspecified type    - Hepatic Function Panel; Future  - Iron Binding Capacity; Future  - Iron Saturation; Future  - Ferritin; Future  - CBC; Future  - Basic Metabolic Panel; Future  - Vitamin D 25 Hydroxy; Future  - TSH With Reflex Ft4; Future  - Lipid Panel; Future      Return in about 2 weeks (around 11/18/2021). Patient given educational materials - see patient instructions. Discussed use, benefit, and side effects of prescribed medications. All patient questions answered. Pt voiced understanding. Reviewed health maintenance.        Electronically signed ENMANUEL Miranda - CNP on 11/4/21 at 1:39 PM EDT

## 2021-11-11 ASSESSMENT — ENCOUNTER SYMPTOMS
PHOTOPHOBIA: 0
BACK PAIN: 1
NAUSEA: 0
SHORTNESS OF BREATH: 0
CONSTIPATION: 0
ABDOMINAL DISTENTION: 0
WHEEZING: 0
COUGH: 0
DIARRHEA: 0
SINUS PRESSURE: 0
BLOOD IN STOOL: 0
SINUS PAIN: 0
ABDOMINAL PAIN: 0
VOMITING: 0
SORE THROAT: 0
TROUBLE SWALLOWING: 0
RHINORRHEA: 0

## 2021-11-17 ENCOUNTER — OFFICE VISIT (OUTPATIENT)
Dept: INTERNAL MEDICINE CLINIC | Age: 56
End: 2021-11-17
Payer: COMMERCIAL

## 2021-11-17 VITALS
HEART RATE: 79 BPM | DIASTOLIC BLOOD PRESSURE: 60 MMHG | BODY MASS INDEX: 17.89 KG/M2 | WEIGHT: 101 LBS | SYSTOLIC BLOOD PRESSURE: 103 MMHG | HEIGHT: 63 IN | TEMPERATURE: 98.4 F

## 2021-11-17 DIAGNOSIS — F32.A ANXIETY AND DEPRESSION: ICD-10-CM

## 2021-11-17 DIAGNOSIS — F11.20 SEVERE OPIOID USE DISORDER (HCC): Primary | ICD-10-CM

## 2021-11-17 DIAGNOSIS — R53.83 FATIGUE, UNSPECIFIED TYPE: ICD-10-CM

## 2021-11-17 DIAGNOSIS — F41.9 ANXIETY AND DEPRESSION: ICD-10-CM

## 2021-11-17 PROCEDURE — 99214 OFFICE O/P EST MOD 30 MIN: CPT | Performed by: NURSE PRACTITIONER

## 2021-11-17 PROCEDURE — 80305 DRUG TEST PRSMV DIR OPT OBS: CPT | Performed by: NURSE PRACTITIONER

## 2021-11-17 RX ORDER — CITALOPRAM 40 MG/1
40 TABLET ORAL DAILY
Qty: 30 TABLET | Refills: 0 | Status: SHIPPED | OUTPATIENT
Start: 2021-11-17 | End: 2021-12-08

## 2021-11-17 RX ORDER — BUPRENORPHINE AND NALOXONE 8; 2 MG/1; MG/1
1 FILM, SOLUBLE BUCCAL; SUBLINGUAL 2 TIMES DAILY
Qty: 28 FILM | Refills: 0 | Status: SHIPPED | OUTPATIENT
Start: 2021-11-17 | End: 2021-12-01 | Stop reason: SDUPTHER

## 2021-11-17 NOTE — PROGRESS NOTES
[address] INTERNAL MEDICINE AND MEDICATION MANAGEMENT  [facility] [address]  Dept: [phone]  Dept Fax: [phone]: [phone]     Visit Date:  11/17/2021    Patient:  Prince Quigley  YOB: 1965    HPI:     Chief Complaint   Patient presents with    Drug Problem     Chao Matthews presents today for medical evaluation of severe opioid use disorder, fatigue, and mood disorder     I last seen her 2 weeks ago.     Denies any use.      Urges and cravings controlled with Suboxone 8 mg BID     PCP has discharged patient from practice     Stopped her trazodone, states it leaves her feeling too tired and groggy the following day. She continues to have significant fatigue.     States that she was diagnosed with COPD > 10 years ago. She has not had repeat PFTs or sleep study. Patient state that her mood has remained unstable. She feels increased anxiety and depression. Is on celexa, was effective initially. Will increase dose. Labs ordered for fatigue, not yet completed. Medications    Current Outpatient Medications:     buprenorphine-naloxone (SUBOXONE) 8-2 MG FILM SL film, Place 1 Film under the tongue 2 times daily for 14 days. , Disp: 28 Film, Rfl: 0    citalopram (CELEXA) 40 MG tablet, Take 1 tablet by mouth daily, Disp: 30 tablet, Rfl: 0    ondansetron (ZOFRAN) 4 MG tablet, Take 1 tablet by mouth 3 times daily as needed for Nausea or Vomiting, Disp: 30 tablet, Rfl: 0    gabapentin (NEURONTIN) 300 MG capsule, Take 300 mg by mouth 3 times daily. , Disp: , Rfl:     naloxone 4 MG/0.1ML LIQD nasal spray, 1 spray by Nasal route as needed for Opioid Reversal, Disp: 1 each, Rfl: 1    triamterene-hydrochlorothiazide (MAXZIDE) 75-50 MG per tablet, Take 1 tablet by mouth daily. , Disp: , Rfl:     The patient has No Known Allergies. Past Medical History  Chao Matthews  has a past medical history of High blood pressure.     Past Surgical History  The patient  has a past surgical history that includes Tubal ligation (4-2002); hernia repair (4-2002); laparoscopy (1994?); Wrist surgery; and cyst removal.    Family History  This patient's family history includes Cancer in her father; Emphysema in her mother; Esophageal Cancer in her father; Heart Disease in her mother. Social History  Loyd Espitia  reports that she has been smoking cigarettes. She started smoking about 41 years ago. She has a 40.00 pack-year smoking history. She has never used smokeless tobacco. She reports previous alcohol use. She reports current drug use. Drug: Opiates . Health Maintenance:    Health Maintenance   Topic Date Due    Hepatitis C screen  Never done    Pneumococcal 0-64 years Vaccine (1 of 2 - PPSV23) Never done    COVID-19 Vaccine (1) Never done    HIV screen  Never done    DTaP/Tdap/Td vaccine (1 - Tdap) Never done    Cervical cancer screen  Never done    Lipid screen  Never done    Colon cancer screen colonoscopy  Never done    Shingles Vaccine (1 of 2) Never done    Low dose CT lung screening  Never done    Breast cancer screen  11/21/2013    Flu vaccine (1) Never done    Potassium monitoring  10/06/2022    Creatinine monitoring  10/06/2022    Hepatitis A vaccine  Aged Out    Hepatitis B vaccine  Aged Out    Hib vaccine  Aged Out    Meningococcal (ACWY) vaccine  Aged Out       Subjective:      Review of Systems   Constitutional: Positive for fatigue. Negative for chills and fever. HENT: Negative for congestion, dental problem, rhinorrhea, sinus pressure, sinus pain, sore throat, tinnitus and trouble swallowing. Eyes: Negative for photophobia and visual disturbance. Respiratory: Positive for shortness of breath (on exertion). Negative for cough and wheezing. Cardiovascular: Negative for chest pain, palpitations and leg swelling.    Gastrointestinal: Negative for abdominal distention, abdominal pain, blood in stool, constipation, diarrhea, nausea and vomiting. Endocrine: Negative for polydipsia, polyphagia and polyuria. Genitourinary: Negative for difficulty urinating, dysuria, frequency, hematuria and urgency. Musculoskeletal: Positive for back pain (chronic). Negative for arthralgias and myalgias. Skin: Negative for rash and wound. Neurological: Negative for dizziness, light-headedness and headaches. Psychiatric/Behavioral: Positive for dysphoric mood. Negative for sleep disturbance. The patient is nervous/anxious. Objective:     /60 (Site: Right Lower Arm, Position: Sitting, Cuff Size: Medium Adult)   Pulse 79   Temp 98.4 °F (36.9 °C) (Temporal)   Ht 5' 3\" (1.6 m)   Wt 101 lb (45.8 kg)   BMI 17.89 kg/m²     Physical Exam  Vitals reviewed. Constitutional:       General: She is not in acute distress. Appearance: Normal appearance. She is not ill-appearing. HENT:      Head: Normocephalic and atraumatic. Right Ear: External ear normal.      Left Ear: External ear normal.      Nose: Nose normal. No congestion or rhinorrhea. Mouth/Throat:      Mouth: Mucous membranes are moist.   Eyes:      Extraocular Movements: Extraocular movements intact. Conjunctiva/sclera: Conjunctivae normal.      Pupils: Pupils are equal, round, and reactive to light. Pulmonary:      Effort: Pulmonary effort is normal. No respiratory distress. Musculoskeletal:         General: Normal range of motion. Cervical back: Normal range of motion and neck supple. Right lower leg: No edema. Left lower leg: No edema. Skin:     General: Skin is warm and dry. Neurological:      General: No focal deficit present. Mental Status: She is alert and oriented to person, place, and time. Psychiatric:         Mood and Affect: Mood normal.         Behavior: Behavior normal.         Thought Content:  Thought content normal.         Judgment: Judgment normal.         Labs Reviewed 11/17/2021:    Lab Results   Component Value Date    ALT 17 10/06/2021    AST 18 10/06/2021     10/06/2021    K 3.6 10/06/2021     10/06/2021    CREATININE 1.2 10/06/2021    BUN 27 (H) 10/06/2021    CO2 24 10/06/2021       Assessment/Plan      1. Severe opioid use disorder (HCC)    - POCT Rapid Drug Screen  - buprenorphine-naloxone (SUBOXONE) 8-2 MG FILM SL film; Place 1 Film under the tongue 2 times daily for 14 days. Dispense: 28 Film; Refill: 0  - Narcan at home  - Labs not yet obtained  - OARRS reviewed, no discrepancies  - Encouraged to attend NA/AA meetings and/or arrange for individualized counseling    2. Anxiety and depression    - citalopram (CELEXA) 40 MG tablet; Take 1 tablet by mouth daily  Dispense: 30 tablet; Refill: 0    3. Fatigue, unspecified type    - Obtain labs ordered previously      Return in about 2 weeks (around 12/1/2021). Patient given educational materials - see patient instructions. Discussed use, benefit, and side effects of prescribed medications. All patient questions answered. Pt voiced understanding. Reviewed health maintenance.        Electronically signed ENMANUEL Bentley - CNP on 11/17/21 at 11:23 AM EST

## 2021-11-20 ASSESSMENT — ENCOUNTER SYMPTOMS
ABDOMINAL DISTENTION: 0
PHOTOPHOBIA: 0
SHORTNESS OF BREATH: 1
BACK PAIN: 1
RHINORRHEA: 0
TROUBLE SWALLOWING: 0
DIARRHEA: 0
NAUSEA: 0
COUGH: 0
CONSTIPATION: 0
WHEEZING: 0
SORE THROAT: 0
SINUS PAIN: 0
BLOOD IN STOOL: 0
VOMITING: 0
ABDOMINAL PAIN: 0
SINUS PRESSURE: 0

## 2021-11-28 ASSESSMENT — ENCOUNTER SYMPTOMS
BACK PAIN: 1
SORE THROAT: 0
ABDOMINAL DISTENTION: 0
WHEEZING: 0
PHOTOPHOBIA: 0
CONSTIPATION: 0
SHORTNESS OF BREATH: 1
SINUS PRESSURE: 0
BLOOD IN STOOL: 0
TROUBLE SWALLOWING: 0
RHINORRHEA: 0
NAUSEA: 0
VOMITING: 0
DIARRHEA: 0
ABDOMINAL PAIN: 0
COUGH: 0
SINUS PAIN: 0

## 2021-12-01 ENCOUNTER — OFFICE VISIT (OUTPATIENT)
Dept: INTERNAL MEDICINE CLINIC | Age: 56
End: 2021-12-01
Payer: COMMERCIAL

## 2021-12-01 VITALS
DIASTOLIC BLOOD PRESSURE: 69 MMHG | BODY MASS INDEX: 18.43 KG/M2 | SYSTOLIC BLOOD PRESSURE: 128 MMHG | WEIGHT: 104 LBS | HEIGHT: 63 IN | HEART RATE: 83 BPM | TEMPERATURE: 98.4 F

## 2021-12-01 DIAGNOSIS — R06.02 SOB (SHORTNESS OF BREATH): ICD-10-CM

## 2021-12-01 DIAGNOSIS — F11.20 SEVERE OPIOID USE DISORDER (HCC): Primary | ICD-10-CM

## 2021-12-01 DIAGNOSIS — R53.83 FATIGUE, UNSPECIFIED TYPE: ICD-10-CM

## 2021-12-01 DIAGNOSIS — Z87.891 PERSONAL HISTORY OF TOBACCO USE: ICD-10-CM

## 2021-12-01 PROCEDURE — G0296 VISIT TO DETERM LDCT ELIG: HCPCS | Performed by: NURSE PRACTITIONER

## 2021-12-01 PROCEDURE — 80305 DRUG TEST PRSMV DIR OPT OBS: CPT | Performed by: NURSE PRACTITIONER

## 2021-12-01 PROCEDURE — 99214 OFFICE O/P EST MOD 30 MIN: CPT | Performed by: NURSE PRACTITIONER

## 2021-12-01 RX ORDER — BUPRENORPHINE AND NALOXONE 8; 2 MG/1; MG/1
1 FILM, SOLUBLE BUCCAL; SUBLINGUAL 2 TIMES DAILY
Qty: 28 FILM | Refills: 0 | Status: SHIPPED | OUTPATIENT
Start: 2021-12-01 | End: 2021-12-14 | Stop reason: SDUPTHER

## 2021-12-01 NOTE — PROGRESS NOTES
[address] INTERNAL MEDICINE AND MEDICATION MANAGEMENT  Deepak Anand [facility] [address]  Dept: [phone]  Dept Fax: [phone]: [phone]     Visit Date:  12/1/2021    Patient:  Jenny Teresa  YOB: 1965    HPI:     Chief Complaint   Patient presents with    Drug Problem     Leola Teague presents today for medical evaluation of severe opioid use disorder, fatigue, shortness of breath, and mood disorder     I last seen her 2 weeks ago.     Denies any use.      Urges and cravings controlled with Suboxone 8 mg BID    Urine positive for buprenorphine and THC     PCP has discharged patient from practice     Stopped her trazodone, states it leaves her feeling too tired and groggy the following day. She continues to have significant fatigue. Has not completed labs previously ordered.     States that she was diagnosed with COPD > 10 years ago. She has not had repeat PFTs or sleep study. Increased shortness of breath on exertion. She is a current everyday smoker. Smoking cessation declined.      Patient state that her mood has remained unstable. She feels increased anxiety and depression. Is on celexa, was effective initially. Increased dose    Medications    Current Outpatient Medications:     gabapentin (NEURONTIN) 300 MG capsule, Take 300 mg by mouth 3 times daily. , Disp: , Rfl:     naloxone 4 MG/0.1ML LIQD nasal spray, 1 spray by Nasal route as needed for Opioid Reversal, Disp: 1 each, Rfl: 1    triamterene-hydrochlorothiazide (MAXZIDE) 75-50 MG per tablet, Take 1 tablet by mouth daily. , Disp: , Rfl:     buprenorphine-naloxone (SUBOXONE) 8-2 MG FILM SL film, Place 1 Film under the tongue 2 times daily for 14 days. , Disp: 28 Film, Rfl: 0    FLUoxetine (PROZAC) 10 MG capsule, Take 1 capsule by mouth daily, Disp: 30 capsule, Rfl: 0    ondansetron (ZOFRAN) 4 MG tablet, TAKE 1 TABLET BY MOUTH THREE TIMES DAILY AS NEEDED FOR NAUSEA OR Positive for shortness of breath (on exertion). Negative for cough and wheezing. Cardiovascular: Negative for chest pain, palpitations and leg swelling. Gastrointestinal: Negative for abdominal distention, abdominal pain, blood in stool, constipation, diarrhea, nausea and vomiting. Endocrine: Negative for polydipsia, polyphagia and polyuria. Genitourinary: Negative for difficulty urinating, dysuria, frequency, hematuria and urgency. Musculoskeletal: Positive for back pain (chronic). Negative for arthralgias and myalgias. Skin: Negative for rash and wound. Neurological: Negative for dizziness, light-headedness and headaches. Psychiatric/Behavioral: Positive for dysphoric mood. Negative for sleep disturbance. The patient is nervous/anxious. Objective:     /69 (Site: Left Lower Arm, Position: Sitting, Cuff Size: Medium Adult)   Pulse 83   Temp 98.4 °F (36.9 °C) (Temporal)   Ht 5' 3\" (1.6 m)   Wt 104 lb (47.2 kg)   BMI 18.42 kg/m²     Physical Exam  Vitals reviewed. Constitutional:       General: She is not in acute distress. Appearance: Normal appearance. She is not ill-appearing. HENT:      Head: Normocephalic and atraumatic. Right Ear: External ear normal.      Left Ear: External ear normal.      Nose: Nose normal. No congestion or rhinorrhea. Mouth/Throat:      Mouth: Mucous membranes are moist.   Eyes:      Extraocular Movements: Extraocular movements intact. Conjunctiva/sclera: Conjunctivae normal.      Pupils: Pupils are equal, round, and reactive to light. Pulmonary:      Effort: Pulmonary effort is normal. No respiratory distress. Musculoskeletal:         General: Normal range of motion. Cervical back: Normal range of motion and neck supple. Right lower leg: No edema. Left lower leg: No edema. Skin:     General: Skin is warm and dry. Neurological:      General: No focal deficit present.       Mental Status: She is alert and oriented to person, place, and time. Psychiatric:         Mood and Affect: Mood normal.         Behavior: Behavior normal.         Thought Content: Thought content normal.         Judgment: Judgment normal.         Labs Reviewed 12/1/2021:    Lab Results   Component Value Date    ALT 17 10/06/2021    AST 18 10/06/2021     10/06/2021    K 3.6 10/06/2021     10/06/2021    CREATININE 1.2 10/06/2021    BUN 27 (H) 10/06/2021    CO2 24 10/06/2021       Assessment/Plan      1. Severe opioid use disorder (HCC)    - POCT Rapid Drug Screen  - buprenorphine-naloxone (SUBOXONE) 8-2 MG FILM SL film; Place 1 Film under the tongue 2 times daily for 14 days. Dispense: 28 Film; Refill: 0  - Narcan at home  - Labs not yet obtained  - OARRS reviewed, no discrepancies  - Encouraged to attend NA/AA meetings and/or arrange for individualized counseling    2. Personal history of tobacco use    - WA VISIT TO DISCUSS LUNG CA SCREEN W LDCT  - CT Lung Screen (Annual); Future  - Full PFT Study With Bronchodilator; Future    3. Shortness of breath     4. Fatigue      Return in about 2 weeks (around 12/15/2021). Patient given educational materials - see patient instructions. Discussed use, benefit, and side effects of prescribed medications. All patient questions answered. Pt voiced understanding. Reviewed health maintenance. Electronically signed ENMANUEL Valadez CNP on 12/1/21 at 11:21 AM EST          Low Dose CT (LDCT) Lung Screening criteria met:     Age 55-77(Medicare) or 50-80 (USPSTF)   Pack year smoking >30 (Medicare) or >20 (USPSTF)   Still smoking or less than 15 year since quit   No sign or symptoms of lung cancer   > 11 months since last LDCT     Risks and benefits of lung cancer screening with LDCT scans discussed:    Significance of positive screen - False-positive LDCT results often occur. 95% of all positive results do not lead to a diagnosis of cancer.  Usually further imaging can resolve most false-positive results; however, some patients may require invasive procedures. Over diagnosis risk - 10% to 12% of screen-detected lung cancer cases are over diagnosed--that is, the cancer would not have been detected in the patient's lifetime without the screening. Need for follow up screens annually to continue lung cancer screening effectiveness     Risks associated with radiation from annual LDCT- Radiation exposure is about the same as for a mammogram, which is about 1/3 of the annual background radiation exposure from everyday life. Starting screening at age 54 is not likely to increase cancer risk from radiation exposure. Patients with comorbidities resulting in life expectancy of < 10 years, or that would preclude treatment of an abnormality identified on CT, should not be screened due to lack of benefit.     To obtain maximal benefit from this screening, smoking cessation and long-term abstinence from smoking is critical

## 2021-12-06 ENCOUNTER — TELEPHONE (OUTPATIENT)
Dept: INTERNAL MEDICINE CLINIC | Age: 56
End: 2021-12-06

## 2021-12-08 DIAGNOSIS — F41.9 ANXIETY AND DEPRESSION: ICD-10-CM

## 2021-12-08 DIAGNOSIS — F32.A ANXIETY AND DEPRESSION: ICD-10-CM

## 2021-12-08 RX ORDER — CITALOPRAM 40 MG/1
TABLET ORAL
Qty: 30 TABLET | Refills: 0 | Status: ON HOLD | OUTPATIENT
Start: 2021-12-08 | End: 2022-01-03 | Stop reason: HOSPADM

## 2021-12-09 RX ORDER — ONDANSETRON 4 MG/1
TABLET, FILM COATED ORAL
Qty: 30 TABLET | Refills: 0 | Status: SHIPPED | OUTPATIENT
Start: 2021-12-09 | End: 2022-05-12 | Stop reason: SDUPTHER

## 2021-12-14 ENCOUNTER — OFFICE VISIT (OUTPATIENT)
Dept: INTERNAL MEDICINE CLINIC | Age: 56
End: 2021-12-14
Payer: COMMERCIAL

## 2021-12-14 VITALS
HEIGHT: 63 IN | HEART RATE: 80 BPM | SYSTOLIC BLOOD PRESSURE: 127 MMHG | WEIGHT: 108 LBS | DIASTOLIC BLOOD PRESSURE: 67 MMHG | TEMPERATURE: 97.3 F | BODY MASS INDEX: 19.14 KG/M2

## 2021-12-14 DIAGNOSIS — R53.83 FATIGUE, UNSPECIFIED TYPE: ICD-10-CM

## 2021-12-14 DIAGNOSIS — F11.20 SEVERE OPIOID USE DISORDER (HCC): Primary | ICD-10-CM

## 2021-12-14 DIAGNOSIS — F39 MOOD DISORDER (HCC): ICD-10-CM

## 2021-12-14 PROCEDURE — 80305 DRUG TEST PRSMV DIR OPT OBS: CPT | Performed by: NURSE PRACTITIONER

## 2021-12-14 PROCEDURE — 99214 OFFICE O/P EST MOD 30 MIN: CPT | Performed by: NURSE PRACTITIONER

## 2021-12-14 RX ORDER — FLUOXETINE 10 MG/1
10 CAPSULE ORAL DAILY
Qty: 30 CAPSULE | Refills: 0 | Status: SHIPPED | OUTPATIENT
Start: 2021-12-14 | End: 2022-01-12

## 2021-12-14 RX ORDER — BUPRENORPHINE AND NALOXONE 8; 2 MG/1; MG/1
1 FILM, SOLUBLE BUCCAL; SUBLINGUAL 2 TIMES DAILY
Qty: 28 FILM | Refills: 0 | Status: ON HOLD | OUTPATIENT
Start: 2021-12-14 | End: 2022-01-03 | Stop reason: HOSPADM

## 2021-12-14 NOTE — PROGRESS NOTES
[address] INTERNAL MEDICINE AND MEDICATION MANAGEMENT  [facility] [place] [address]  Dept: [phone]  Dept Fax: [phone]: [phone]     Visit Date:  12/14/2021    Patient:  Molly Mckenzie  YOB: 1965    HPI:     Chief Complaint   Patient presents with    Drug Problem     Claudine Pavon presents today for medical evaluation of severe opioid use disorder, fatigue, and mood disorder     I last seen her 2 weeks ago.     Denies any use.      Urges and cravings controlled with Suboxone 8 mg BID     PCP has discharged patient from practice    Urine positive for buprenorphine and THC     Stopped her trazodone, states it leaves her feeling too tired and groggy the following day. She continues to have significant fatigue.     States that she was diagnosed with COPD > 10 years ago. She has not had repeat PFTs or sleep study. Is a current everyday smoker. Declines cessation. CT scheduled for 1/5/22. BMI is 17.21%. Noted shortness of breath on exertion.     Patient state that her mood has remained unstable. She feels increased anxiety and depression. Is on celexa, was effective initially. Thought we increased dose previously, but patient states that she mist have been confused because she was already taking 40 mg. She has been on prozac in the past, feels that it was more helpful. Will wean off of celexa and start prozac. Detailed wean schedule printed for patient, and discussed at length. Understanding voiced.       Labs ordered for fatigue, not yet completed. Medications  No current outpatient medications on file. The patient has No Known Allergies. Past Medical History  Claudine Pavon  has a past medical history of COPD (chronic obstructive pulmonary disease) ([facility] [address] and High blood pressure. Past Surgical History  The patient  has a past surgical history that includes Tubal ligation (4-2002); hernia repair (4-2002); laparoscopy (1994?);  Wrist surgery; and cyst removal.    Family History  This patient's family history includes Cancer in her father; Emphysema in her mother; Esophageal Cancer in her father; Heart Disease in her mother. Social History  Narciso John  reports that she has been smoking cigarettes. She started smoking about 41 years ago. She has a 40.00 pack-year smoking history. She has never used smokeless tobacco. She reports previous alcohol use. She reports previous drug use. Drug: Opiates . Health Maintenance:    Health Maintenance   Topic Date Due    Hepatitis C screen  Never done    COVID-19 Vaccine (1) Never done    Pneumococcal 0-64 years Vaccine (1 of 2 - PPSV23) Never done    HIV screen  Never done    DTaP/Tdap/Td vaccine (1 - Tdap) Never done    Cervical cancer screen  Never done    Lipid screen  Never done    Colon cancer screen colonoscopy  Never done    Shingles Vaccine (1 of 2) Never done    Low dose CT lung screening  Never done    Breast cancer screen  11/21/2013    Flu vaccine (1) Never done    Potassium monitoring  12/28/2022    Creatinine monitoring  12/28/2022    Hepatitis A vaccine  Aged Out    Hepatitis B vaccine  Aged Out    Hib vaccine  Aged Out    Meningococcal (ACWY) vaccine  Aged Out       Subjective:      Review of Systems   Constitutional: Positive for fatigue. Negative for chills and fever. HENT: Negative for congestion, dental problem, rhinorrhea, sinus pressure, sinus pain, sore throat, tinnitus and trouble swallowing. Eyes: Negative for photophobia and visual disturbance. Respiratory: Positive for shortness of breath (on exertion). Negative for cough and wheezing. Cardiovascular: Negative for chest pain, palpitations and leg swelling. Gastrointestinal: Negative for abdominal distention, abdominal pain, blood in stool, constipation, diarrhea, nausea and vomiting. Endocrine: Negative for polydipsia, polyphagia and polyuria.    Genitourinary: Negative for difficulty urinating, dysuria, frequency, hematuria and urgency. Musculoskeletal: Positive for back pain (chronic). Negative for arthralgias and myalgias. Skin: Negative for rash and wound. Neurological: Negative for dizziness, light-headedness and headaches. Psychiatric/Behavioral: Positive for dysphoric mood. Negative for sleep disturbance. The patient is nervous/anxious. Objective:     /67 (Site: Left Lower Arm, Position: Sitting, Cuff Size: Medium Adult)   Pulse 80   Temp 97.3 °F (36.3 °C) (Temporal)   Ht 5' 3\" (1.6 m)   Wt 108 lb (49 kg)   BMI 19.13 kg/m²     Physical Exam  Vitals reviewed. Constitutional:       General: She is not in acute distress. Appearance: Normal appearance. She is not ill-appearing. HENT:      Head: Normocephalic and atraumatic. Right Ear: External ear normal.      Left Ear: External ear normal.      Nose: Nose normal. No congestion or rhinorrhea. Mouth/Throat:      Mouth: Mucous membranes are moist.   Eyes:      Extraocular Movements: Extraocular movements intact. Conjunctiva/sclera: Conjunctivae normal.      Pupils: Pupils are equal, round, and reactive to light. Pulmonary:      Effort: Pulmonary effort is normal. No respiratory distress. Musculoskeletal:         General: Normal range of motion. Cervical back: Normal range of motion and neck supple. Right lower leg: No edema. Left lower leg: No edema. Skin:     General: Skin is warm and dry. Neurological:      General: No focal deficit present. Mental Status: She is alert and oriented to person, place, and time. Psychiatric:         Mood and Affect: Mood normal.         Behavior: Behavior normal.         Thought Content:  Thought content normal.         Judgment: Judgment normal.         Labs Reviewed 12/14/2021:    Lab Results   Component Value Date    WBC 13.7 (H) 12/28/2021    HGB 13.2 12/28/2021    HCT 40.9 12/28/2021     12/28/2021    ALT 13 12/28/2021    AST 22 12/28/2021     12/28/2021    K 4.2 12/28/2021     12/28/2021    CREATININE 1.4 (H) 12/28/2021    BUN 41 (H) 12/28/2021    CO2 22 (L) 12/28/2021    TSH 0.822 12/27/2021       Assessment/Plan      1. Severe opioid use disorder (HCC)    - POCT Rapid Drug Screen  - buprenorphine-naloxone (SUBOXONE) 8-2 MG FILM SL film; Place 1 Film under the tongue 2 times daily for 14 days. Dispense: 28 Film; Refill: 0  - Narcan at home  - Labs not yet obtained  - OARRS reviewed, no discrepancies  - Encouraged to attend NA/AA meetings and/or arrange for individualized counseling    2. Fatigue, unspecified type    - Obtain labs ordered previously    3. Mood disorder (HCC)    - FLUoxetine (PROZAC) 10 MG capsule; Take 1 capsule by mouth daily  Dispense: 30 capsule; Refill: 0  - Wean celexa, start prozac. Wean scheduled given. Return in about 2 weeks (around 12/28/2021). Patient given educational materials - see patient instructions. Discussed use, benefit, and side effects of prescribed medications. All patient questions answered. Pt voiced understanding. Reviewed health maintenance.        Electronically signed ENMANUEL Bravo CNP on 12/14/21 at 2:14 PM EST

## 2021-12-14 NOTE — PATIENT INSTRUCTIONS
Wean scheduled for celexa-     Week 1:    Alternate 1 tab with 0.5 tab    Week 2:    Take 0.5 tab     Week 3:    Alternate 0.5 tab with 0 tab    Week 4:     STOP

## 2021-12-19 ASSESSMENT — ENCOUNTER SYMPTOMS
RHINORRHEA: 0
WHEEZING: 0
TROUBLE SWALLOWING: 0
BLOOD IN STOOL: 0
SINUS PRESSURE: 0
CONSTIPATION: 0
VOMITING: 0
SINUS PAIN: 0
DIARRHEA: 0
SHORTNESS OF BREATH: 1
PHOTOPHOBIA: 0
SORE THROAT: 0
BACK PAIN: 1
ABDOMINAL DISTENTION: 0
ABDOMINAL PAIN: 0
COUGH: 0
NAUSEA: 0

## 2021-12-23 ENCOUNTER — HOSPITAL ENCOUNTER (EMERGENCY)
Age: 56
Discharge: HOME | End: 2021-12-23
Attending: FAMILY MEDICINE
Payer: COMMERCIAL

## 2021-12-23 ENCOUNTER — APPOINTMENT (OUTPATIENT)
Dept: GENERAL RADIOLOGY | Age: 56
End: 2021-12-23
Payer: COMMERCIAL

## 2021-12-23 VITALS
WEIGHT: 105 LBS | RESPIRATION RATE: 16 BRPM | SYSTOLIC BLOOD PRESSURE: 134 MMHG | HEIGHT: 66 IN | HEART RATE: 78 BPM | OXYGEN SATURATION: 91 % | DIASTOLIC BLOOD PRESSURE: 84 MMHG | BODY MASS INDEX: 16.88 KG/M2 | TEMPERATURE: 98.5 F

## 2021-12-23 DIAGNOSIS — S20.222A CONTUSION OF LEFT SIDE OF BACK, INITIAL ENCOUNTER: Primary | ICD-10-CM

## 2021-12-23 PROCEDURE — 99283 EMERGENCY DEPT VISIT LOW MDM: CPT

## 2021-12-23 PROCEDURE — 72100 X-RAY EXAM L-S SPINE 2/3 VWS: CPT

## 2021-12-23 PROCEDURE — 96372 THER/PROPH/DIAG INJ SC/IM: CPT

## 2021-12-23 PROCEDURE — 6360000002 HC RX W HCPCS: Performed by: FAMILY MEDICINE

## 2021-12-23 RX ORDER — CYCLOBENZAPRINE HCL 10 MG
10 TABLET ORAL 2 TIMES DAILY PRN
Qty: 20 TABLET | Refills: 0 | Status: ON HOLD | OUTPATIENT
Start: 2021-12-23 | End: 2022-01-03 | Stop reason: HOSPADM

## 2021-12-23 RX ORDER — KETOROLAC TROMETHAMINE 30 MG/ML
30 INJECTION, SOLUTION INTRAMUSCULAR; INTRAVENOUS ONCE
Status: COMPLETED | OUTPATIENT
Start: 2021-12-23 | End: 2021-12-23

## 2021-12-23 RX ADMIN — KETOROLAC TROMETHAMINE 30 MG: 30 INJECTION, SOLUTION INTRAMUSCULAR; INTRAVENOUS at 14:42

## 2021-12-23 ASSESSMENT — PAIN DESCRIPTION - FREQUENCY: FREQUENCY: INTERMITTENT

## 2021-12-23 ASSESSMENT — PAIN DESCRIPTION - ORIENTATION
ORIENTATION: LEFT;LOWER
ORIENTATION: LEFT;LOWER

## 2021-12-23 ASSESSMENT — ENCOUNTER SYMPTOMS
BACK PAIN: 1
VOMITING: 0
NAUSEA: 0

## 2021-12-23 ASSESSMENT — PAIN DESCRIPTION - LOCATION
LOCATION: BACK
LOCATION: BACK

## 2021-12-23 ASSESSMENT — PAIN SCALES - GENERAL
PAINLEVEL_OUTOF10: 3
PAINLEVEL_OUTOF10: 3

## 2021-12-23 ASSESSMENT — PAIN DESCRIPTION - PAIN TYPE
TYPE: ACUTE PAIN
TYPE: ACUTE PAIN

## 2021-12-23 NOTE — ED NOTES
Pt pink, warm and dry, breathing with ease. Prescription explained, pt states understanding. AVS reviewed including follow up appointments, diagnosis, and care of self at home. Denies questions or concerns. Pt remains alert and oriented. Pt discharged in stable condition, walking without difficulty.       Lance Adams RN  12/23/21 3957

## 2021-12-23 NOTE — ED PROVIDER NOTES
[facility]  eMERGENCY dEPARTMENT eNCOUnter          [address] [facility]       Chief Complaint   Patient presents with    Back Pain     left low back pain from a fall. Nurses Notes reviewed and I agree except as noted in the HPI. HISTORY OF PRESENT ILLNESS    Yobani Sánchez is a 60 y.o. female who presents with low back pain. Patient states that she tripped and fell striking her lower back on a bar. She notes moderate pain. Made worse with walking. Denies any referred pain to her lower legs. Denies any bowel or bladder issues. Does have a history of opiate dependence and is currently taking opiate replacement products. REVIEW OF SYSTEMS     Review of Systems   Constitutional: Negative for chills and fever. Gastrointestinal: Negative for nausea and vomiting. Genitourinary: Negative for dysuria and frequency. Musculoskeletal: Positive for back pain. Skin: Negative for rash and wound. Psychiatric/Behavioral: Negative for agitation and behavioral problems. All other systems reviewed and are negative. PAST MEDICAL HISTORY    has a past medical history of COPD (chronic obstructive pulmonary disease) ([address] and High blood pressure. SURGICAL HISTORY      has a past surgical history that includes Tubal ligation (4-2002); hernia repair (4-2002); laparoscopy (1994?); Wrist surgery; and cyst removal.    CURRENT MEDICATIONS       Previous Medications    BUPRENORPHINE-NALOXONE (SUBOXONE) 8-2 MG FILM SL FILM    Place 1 Film under the tongue 2 times daily for 14 days. CITALOPRAM (CELEXA) 40 MG TABLET    Take 1 tablet by mouth once daily    FLUOXETINE (PROZAC) 10 MG CAPSULE    Take 1 capsule by mouth daily    GABAPENTIN (NEURONTIN) 300 MG CAPSULE    Take 300 mg by mouth 3 times daily.     NALOXONE 4 MG/0.1ML LIQD NASAL SPRAY    1 spray by Nasal route as needed for Opioid Reversal    ONDANSETRON (ZOFRAN) 4 MG TABLET    TAKE 1 TABLET BY MOUTH THREE TIMES DAILY AS NEEDED FOR NAUSEA OR  VOMITING    TRIAMTERENE-HYDROCHLOROTHIAZIDE (MAXZIDE) 75-50 MG PER TABLET    Take 1 tablet by mouth daily. ALLERGIES     has No Known Allergies. FAMILY HISTORY     She indicated that her mother is . She indicated that her father is . family history includes Cancer in her father; Emphysema in her mother; Esophageal Cancer in her father; Heart Disease in her mother. SOCIAL HISTORY      reports that she has been smoking cigarettes. She started smoking about 41 years ago. She has a 40.00 pack-year smoking history. She has never used smokeless tobacco. She reports previous alcohol use. She reports previous drug use. Drug: Opiates . PHYSICAL EXAM     INITIAL VITALS:  height is 5' 5.5\" (1.664 m) and weight is 105 lb (47.6 kg). Her temporal temperature is 98.5 °F (36.9 °C). Her blood pressure is 134/84 and her pulse is 78. Her respiration is 16 and oxygen saturation is 91%. Physical Exam  Vitals and nursing note reviewed. Constitutional:       General: She is in acute distress. HENT:      Head: Normocephalic and atraumatic. Musculoskeletal:         General: Tenderness (left paraspinal lumbar area) and signs of injury present. No swelling or deformity. Skin:     General: Skin is dry. Findings: Bruising present. No erythema. Neurological:      Mental Status: She is alert. Sensory: No sensory deficit. Coordination: Coordination normal.   Psychiatric:         Mood and Affect: Mood is anxious.          DIFFERENTIAL DIAGNOSIS:   Contusion lower back,drug seeking     DIAGNOSTIC RESULTS     EKG: All EKG's are interpreted by the Emergency Department Physician who either signs or Co-signs this chart in the absence of a cardiologist.      RADIOLOGY: non-plain film images(s) such as CT, Ultrasound and MRI are read by the radiologist.        XR LUMBAR SPINE (2-3 VIEWS) (Final result)  Result time 21 15:52:58  Final result by Teto Dinh MD (21 15:52:58)                Impression:    1. No acute bony abnormality. 2. Mild lumbar spondylosis. 3. Dextroscoliosis. **This report has been created using voice recognition software.  It may contain minor errors which are inherent in voice recognition technology. **     Final report electronically signed by Dr Gay Gleason on 12/23/2021 3:52 PM            Narrative:    PROCEDURE: XR LUMBAR SPINE (2-3 VIEWS)     CLINICAL INFORMATION: left paraspinal pain post fall     COMPARISON: 10/6/2021     TECHNIQUE: AP and lateral views performed. FINDINGS:     There is dextroscoliosis. Mild lumbar spondylosis. Mild multilevel facet arthrosis. Vascular calcifications. No acute fracture or dislocation. Lumbar lordosis is maintained. .     5 lumbar vertebrae are seen. LABS:   Labs Reviewed - No data to display    EMERGENCY DEPARTMENT COURSE:   Vitals:    Vitals:    12/23/21 1427   BP: 134/84   Pulse: 78   Resp: 16   Temp: 98.5 °F (36.9 °C)   TempSrc: Temporal   SpO2: 91%   Weight: 105 lb (47.6 kg)   Height: 5' 5.5\" (1.664 m)     On exam the patient is holding her lower back. She does not have any visible bruising. She does not seem to carter any pain with palpation to the spinous process however to the left paraspinal area of the lower lumbar area she does appear to be tender. I did review her chart she does have an opiate history and is currently on replacement of opiate therapy. I provided her Toradol 30 mg. Her vital signs are stable. X-rays of the lumbar spine show no fracture. Will provide muscle relaxant. Recommended NSAID;s for pain. Care instructions provided. PROCEDURES:  None    FINAL IMPRESSION      1. Contusion of left side of back, initial encounter          DISPOSITION/PLAN   Home. Care instructions provided. Follow up with PCP or ED as needed.      PATIENT REFERRED TO:  Dafne Johnson, ENMANUEL - CNP  [address] [address]  [phone]    Call in 3 days  If symptoms worsen, As needed      DISCHARGE MEDICATIONS:  New Prescriptions    CYCLOBENZAPRINE (FLEXERIL) 10 MG TABLET    Take 1 tablet by mouth 2 times daily as needed for Muscle spasms       (Please note that portions of this note were completed with a voice recognition program.  Efforts were made to edit the dictations but occasionally words are mis-transcribed.)    Cynthia Bosworth, MD Cynthia Bosworth, MD  12/23/21 8810

## 2021-12-27 ENCOUNTER — APPOINTMENT (OUTPATIENT)
Dept: GENERAL RADIOLOGY | Age: 56
DRG: 917 | End: 2021-12-27
Payer: COMMERCIAL

## 2021-12-27 ENCOUNTER — APPOINTMENT (OUTPATIENT)
Dept: CT IMAGING | Age: 56
DRG: 917 | End: 2021-12-27
Payer: COMMERCIAL

## 2021-12-27 ENCOUNTER — HOSPITAL ENCOUNTER (INPATIENT)
Age: 56
LOS: 6 days | Discharge: HOME | DRG: 917 | End: 2022-01-03
Attending: EMERGENCY MEDICINE | Admitting: INTERNAL MEDICINE
Payer: COMMERCIAL

## 2021-12-27 DIAGNOSIS — R00.1 BRADYCARDIA: ICD-10-CM

## 2021-12-27 DIAGNOSIS — R41.82 ALTERED MENTAL STATUS, UNSPECIFIED ALTERED MENTAL STATUS TYPE: Primary | ICD-10-CM

## 2021-12-27 DIAGNOSIS — R63.0 ANOREXIA: ICD-10-CM

## 2021-12-27 DIAGNOSIS — N17.9 AKI (ACUTE KIDNEY INJURY) (HCC): ICD-10-CM

## 2021-12-27 DIAGNOSIS — I95.9 HYPOTENSION, UNSPECIFIED HYPOTENSION TYPE: ICD-10-CM

## 2021-12-27 DIAGNOSIS — T58.91XA TOXIC EFFECT OF CARBON MONOXIDE, UNINTENTIONAL, INITIAL ENCOUNTER: ICD-10-CM

## 2021-12-27 LAB
ACETAMINOPHEN LEVEL: < 5 UG/ML (ref 0–20)
ALBUMIN SERPL-MCNC: 3.2 G/DL (ref 3.5–5.1)
ALP BLD-CCNC: 70 U/L (ref 38–126)
ALT SERPL-CCNC: 13 U/L (ref 11–66)
AMMONIA: 20 UMOL/L (ref 11–60)
AMPHETAMINE+METHAMPHETAMINE URINE SCREEN: NEGATIVE
ANION GAP SERPL CALCULATED.3IONS-SCNC: 8 MEQ/L (ref 8–16)
AST SERPL-CCNC: 22 U/L (ref 5–40)
BARBITURATE QUANTITATIVE URINE: NEGATIVE
BASOPHILS # BLD: 0.4 %
BASOPHILS ABSOLUTE: 0 THOU/MM3 (ref 0–0.1)
BENZODIAZEPINE QUANTITATIVE URINE: NEGATIVE
BILIRUB SERPL-MCNC: 0.4 MG/DL (ref 0.3–1.2)
BILIRUBIN URINE: NEGATIVE
BLOOD, URINE: NEGATIVE
BUN BLDV-MCNC: 53 MG/DL (ref 7–22)
CALCIUM SERPL-MCNC: 8.7 MG/DL (ref 8.5–10.5)
CANNABINOID QUANTITATIVE URINE: POSITIVE
CARBON MONOXIDE, BLOOD: 13.9 % CO SAT
CHARACTER, URINE: CLEAR
CHLORIDE BLD-SCNC: 103 MEQ/L (ref 98–111)
CO2: 23 MEQ/L (ref 23–33)
COCAINE METABOLITE QUANTITATIVE URINE: NEGATIVE
COLOR: YELLOW
CREAT SERPL-MCNC: 1.9 MG/DL (ref 0.4–1.2)
EOSINOPHIL # BLD: 1 %
EOSINOPHILS ABSOLUTE: 0.1 THOU/MM3 (ref 0–0.4)
ERYTHROCYTE [DISTWIDTH] IN BLOOD BY AUTOMATED COUNT: 13.7 % (ref 11.5–14.5)
ERYTHROCYTE [DISTWIDTH] IN BLOOD BY AUTOMATED COUNT: 51.8 FL (ref 35–45)
ETHYL ALCOHOL, SERUM: < 0.01 %
GFR SERPL CREATININE-BSD FRML MDRD: 27 ML/MIN/1.73M2
GLUCOSE BLD-MCNC: 92 MG/DL (ref 70–108)
GLUCOSE URINE: NEGATIVE MG/DL
HCT VFR BLD CALC: 38 % (ref 37–47)
HEMOGLOBIN: 12.4 GM/DL (ref 12–16)
IMMATURE GRANS (ABS): 0.05 THOU/MM3 (ref 0–0.07)
IMMATURE GRANULOCYTES: 0.4 %
KETONES, URINE: NEGATIVE
LACTIC ACID, SEPSIS: 0.6 MMOL/L (ref 0.5–1.9)
LEUKOCYTE ESTERASE, URINE: NEGATIVE
LIPASE: 11.3 U/L (ref 5.6–51.3)
LYMPHOCYTES # BLD: 13.6 %
LYMPHOCYTES ABSOLUTE: 1.6 THOU/MM3 (ref 1–4.8)
MCH RBC QN AUTO: 33.3 PG (ref 26–33)
MCHC RBC AUTO-ENTMCNC: 32.6 GM/DL (ref 32.2–35.5)
MCV RBC AUTO: 102.2 FL (ref 81–99)
MONOCYTES # BLD: 9.6 %
MONOCYTES ABSOLUTE: 1.2 THOU/MM3 (ref 0.4–1.3)
NITRITE, URINE: NEGATIVE
NUCLEATED RED BLOOD CELLS: 0 /100 WBC
OPIATES, URINE: NEGATIVE
OSMOLALITY CALCULATION: 282.3 MOSMOL/KG (ref 275–300)
OXYCODONE: NEGATIVE
PH UA: 6 (ref 5–9)
PHENCYCLIDINE QUANTITATIVE URINE: NEGATIVE
PLATELET # BLD: 218 THOU/MM3 (ref 130–400)
PMV BLD AUTO: 9.2 FL (ref 9.4–12.4)
POTASSIUM REFLEX MAGNESIUM: 4.4 MEQ/L (ref 3.5–5.2)
PRO-BNP: 1368 PG/ML (ref 0–900)
PROCALCITONIN: 0.15 NG/ML (ref 0.01–0.09)
PROTEIN UA: NEGATIVE
RBC # BLD: 3.72 MILL/MM3 (ref 4.2–5.4)
SALICYLATE, SERUM: < 0.3 MG/DL (ref 2–10)
SARS-COV-2, NAAT: NOT  DETECTED
SEG NEUTROPHILS: 75 %
SEGMENTED NEUTROPHILS ABSOLUTE COUNT: 9 THOU/MM3 (ref 1.8–7.7)
SODIUM BLD-SCNC: 134 MEQ/L (ref 135–145)
SPECIFIC GRAVITY, URINE: 1.01 (ref 1–1.03)
TOTAL PROTEIN: 5.9 G/DL (ref 6.1–8)
TROPONIN T: < 0.01 NG/ML
TSH SERPL DL<=0.05 MIU/L-ACNC: 0.82 UIU/ML (ref 0.4–4.2)
UROBILINOGEN, URINE: 0.2 EU/DL (ref 0–1)
WBC # BLD: 12 THOU/MM3 (ref 4.8–10.8)

## 2021-12-27 PROCEDURE — 70450 CT HEAD/BRAIN W/O DYE: CPT

## 2021-12-27 PROCEDURE — 94640 AIRWAY INHALATION TREATMENT: CPT

## 2021-12-27 PROCEDURE — 2580000003 HC RX 258: Performed by: NURSE PRACTITIONER

## 2021-12-27 PROCEDURE — 80179 DRUG ASSAY SALICYLATE: CPT

## 2021-12-27 PROCEDURE — 81003 URINALYSIS AUTO W/O SCOPE: CPT

## 2021-12-27 PROCEDURE — 84443 ASSAY THYROID STIM HORMONE: CPT

## 2021-12-27 PROCEDURE — 71045 X-RAY EXAM CHEST 1 VIEW: CPT

## 2021-12-27 PROCEDURE — 02HV33Z INSERTION OF INFUSION DEVICE INTO SUPERIOR VENA CAVA, PERCUTANEOUS APPROACH: ICD-10-PCS | Performed by: STUDENT IN AN ORGANIZED HEALTH CARE EDUCATION/TRAINING PROGRAM

## 2021-12-27 PROCEDURE — 84484 ASSAY OF TROPONIN QUANT: CPT

## 2021-12-27 PROCEDURE — 96375 TX/PRO/DX INJ NEW DRUG ADDON: CPT

## 2021-12-27 PROCEDURE — 2580000003 HC RX 258: Performed by: PHYSICIAN ASSISTANT

## 2021-12-27 PROCEDURE — 82375 ASSAY CARBOXYHB QUANT: CPT

## 2021-12-27 PROCEDURE — 87040 BLOOD CULTURE FOR BACTERIA: CPT

## 2021-12-27 PROCEDURE — 87635 SARS-COV-2 COVID-19 AMP PRB: CPT

## 2021-12-27 PROCEDURE — 83690 ASSAY OF LIPASE: CPT

## 2021-12-27 PROCEDURE — 6370000000 HC RX 637 (ALT 250 FOR IP): Performed by: PHYSICIAN ASSISTANT

## 2021-12-27 PROCEDURE — 80307 DRUG TEST PRSMV CHEM ANLYZR: CPT

## 2021-12-27 PROCEDURE — 82077 ASSAY SPEC XCP UR&BREATH IA: CPT

## 2021-12-27 PROCEDURE — 99284 EMERGENCY DEPT VISIT MOD MDM: CPT

## 2021-12-27 PROCEDURE — 36415 COLL VENOUS BLD VENIPUNCTURE: CPT

## 2021-12-27 PROCEDURE — 6360000002 HC RX W HCPCS: Performed by: NURSE PRACTITIONER

## 2021-12-27 PROCEDURE — 93005 ELECTROCARDIOGRAM TRACING: CPT | Performed by: EMERGENCY MEDICINE

## 2021-12-27 PROCEDURE — 82140 ASSAY OF AMMONIA: CPT

## 2021-12-27 PROCEDURE — 80053 COMPREHEN METABOLIC PANEL: CPT

## 2021-12-27 PROCEDURE — 84145 PROCALCITONIN (PCT): CPT

## 2021-12-27 PROCEDURE — 6360000002 HC RX W HCPCS

## 2021-12-27 PROCEDURE — 85025 COMPLETE CBC W/AUTO DIFF WBC: CPT

## 2021-12-27 PROCEDURE — 83605 ASSAY OF LACTIC ACID: CPT

## 2021-12-27 PROCEDURE — 80143 DRUG ASSAY ACETAMINOPHEN: CPT

## 2021-12-27 PROCEDURE — 83880 ASSAY OF NATRIURETIC PEPTIDE: CPT

## 2021-12-27 PROCEDURE — 96374 THER/PROPH/DIAG INJ IV PUSH: CPT

## 2021-12-27 RX ORDER — ATROPINE SULFATE 0.1 MG/ML
0.5 INJECTION INTRAVENOUS ONCE
Status: COMPLETED | OUTPATIENT
Start: 2021-12-27 | End: 2021-12-27

## 2021-12-27 RX ORDER — ATROPINE SULFATE 0.4 MG/ML
0.4 AMPUL (ML) INJECTION ONCE
Status: DISCONTINUED | OUTPATIENT
Start: 2021-12-27 | End: 2021-12-27 | Stop reason: SDUPTHER

## 2021-12-27 RX ORDER — 0.9 % SODIUM CHLORIDE 0.9 %
1000 INTRAVENOUS SOLUTION INTRAVENOUS ONCE
Status: COMPLETED | OUTPATIENT
Start: 2021-12-27 | End: 2021-12-27

## 2021-12-27 RX ORDER — NALOXONE HYDROCHLORIDE 0.4 MG/ML
0.4 INJECTION, SOLUTION INTRAMUSCULAR; INTRAVENOUS; SUBCUTANEOUS ONCE
Status: COMPLETED | OUTPATIENT
Start: 2021-12-27 | End: 2021-12-27

## 2021-12-27 RX ORDER — ONDANSETRON 2 MG/ML
4 INJECTION INTRAMUSCULAR; INTRAVENOUS ONCE
Status: COMPLETED | OUTPATIENT
Start: 2021-12-27 | End: 2021-12-27

## 2021-12-27 RX ORDER — IPRATROPIUM BROMIDE AND ALBUTEROL SULFATE 2.5; .5 MG/3ML; MG/3ML
1 SOLUTION RESPIRATORY (INHALATION) ONCE
Status: COMPLETED | OUTPATIENT
Start: 2021-12-27 | End: 2021-12-27

## 2021-12-27 RX ORDER — ATROPINE SULFATE 1 MG/ML
0.4 INJECTION, SOLUTION INTRAMUSCULAR; INTRAVENOUS; SUBCUTANEOUS ONCE
Status: DISCONTINUED | OUTPATIENT
Start: 2021-12-27 | End: 2021-12-28

## 2021-12-27 RX ORDER — DOPAMINE HYDROCHLORIDE 160 MG/100ML
2-20 INJECTION, SOLUTION INTRAVENOUS CONTINUOUS
Status: DISCONTINUED | OUTPATIENT
Start: 2021-12-27 | End: 2021-12-28

## 2021-12-27 RX ORDER — ONDANSETRON 2 MG/ML
INJECTION INTRAMUSCULAR; INTRAVENOUS
Status: COMPLETED
Start: 2021-12-27 | End: 2021-12-27

## 2021-12-27 RX ADMIN — ATROPINE SULFATE 0.5 MG: 0.1 INJECTION, SOLUTION ENDOTRACHEAL; INTRAMUSCULAR; INTRAVENOUS; SUBCUTANEOUS at 19:27

## 2021-12-27 RX ADMIN — IPRATROPIUM BROMIDE AND ALBUTEROL SULFATE 1 AMPULE: .5; 3 SOLUTION RESPIRATORY (INHALATION) at 18:02

## 2021-12-27 RX ADMIN — ONDANSETRON 4 MG: 2 INJECTION INTRAMUSCULAR; INTRAVENOUS at 20:58

## 2021-12-27 RX ADMIN — NALOXONE HYDROCHLORIDE 0.4 MG: 0.4 INJECTION, SOLUTION INTRAMUSCULAR; INTRAVENOUS; SUBCUTANEOUS at 20:24

## 2021-12-27 RX ADMIN — DOPAMINE HYDROCHLORIDE IN DEXTROSE 2 MCG/KG/MIN: 1.6 INJECTION, SOLUTION INTRAVENOUS at 19:25

## 2021-12-27 RX ADMIN — SODIUM CHLORIDE 1000 ML: 9 INJECTION, SOLUTION INTRAVENOUS at 17:11

## 2021-12-27 RX ADMIN — SODIUM CHLORIDE 1000 ML: 9 INJECTION, SOLUTION INTRAVENOUS at 19:20

## 2021-12-27 RX ADMIN — SODIUM CHLORIDE 1000 ML: 9 INJECTION, SOLUTION INTRAVENOUS at 18:10

## 2021-12-27 NOTE — ED PROVIDER NOTES
Physician Note:    Patient was seen by Maria Victoria Mccoy and I co-managed the case    I have personally performed and participated in the history, exam and medical decision making and agree with all pertinent clinical information. I have also reviewed and agree with the past medical, family and social history unless otherwise noted. Patient presented to the emergency room due to  lethargy, not eating. Patient is a poor historian, history is taken from the son and also the . Patient accordingly has history of opiate addiction and been weaned off was getting Suboxone for the past 3 months now. Patient however has not been eating ,she had history of depression however they are trying to wean off the Celexa, 40 mg initially and been weaned off for the past 3 weeks, patient has not been taking it for the past 5 days since [place] having hallucinations, been shaking, having stiffness however patient is anorexic not been eating much for the past 2 years. Patient has not eaten anything today yesterday she picked her food. Patient was brought in here because of being lethargic and has not eaten the whole day today. Patient denies suicidal thoughts or ideation she has depression however she is not giving up    Physical examination showed patient is cachectic not in distress lungs clear to auscultation, heart regular rate and rhythm, abdomen soft flat nontender no hepatosplenomegaly normoactive bowel sounds neuro examination within normal limits    Evaluation: Agree above , seen the patient and discussed the diagnosis and treatment plans     FINAL IMPRESSION       1. Altered mental status, unspecified altered mental status type    2. Toxic effect of carbon monoxide, unintentional, initial encounter    3. Hypotension, unspecified hypotension type    4. Bradycardia    5. SHARONA (acute kidney injury) ([facility] [address]    6.  Anorexia            DISPOSITION/PLAN  PATIENT REFERRED TO: Patient is referred to the critical care services [address] graciously admitted the patient.       Daniel Gray MD      Emergency room physician     Daniel Gray MD  12/29/21 8111

## 2021-12-27 NOTE — ED TRIAGE NOTES
Pt in through lobby with family. They state on bandar she was acting lethargic and out of it. This has greatly worsened since then. She is on suboxone and is currently being weaned off celexa. Family is unsure if she has been taking her medications correctly. Pt is lethargic in room. She will wake up to verbal stimuli for short periods of time. She has delayed responses. She is alert and oriented.

## 2021-12-27 NOTE — ED PROVIDER NOTES
[facility]  eMERGENCY dEPARTMENT eNCOUnter          CHIEF COMPLAINT       Chief Complaint   Patient presents with    Altered Mental Status    Fatigue       Nurses Notes reviewed and I agree except as noted in the HPI. HISTORY OF PRESENT ILLNESS    Ricky Olmedo is a 60 y.o. female who presents to the Emergency Department for the evaluation of altered mental status. History obtained primarily from family at bedside,  and children. Patient is able to state that she is not having any current pain. She acknowledges some difficulty with her memory. Family reports that the patient has been in the process of weaning off of her Celexa but did not understand the instructions and weaned off over the course of 2 weeks instead of 4. She did take a half dose yesterday and today hoping it would help her symptoms. Family denies any concern for intentional overuse/abuse. States she smokes 2 packs/day and uses marijuana. Reports rare alcohol use but she did have 2 drinks on Diamond and feel her symptoms escalated after that. She has chronic pain which is generalized but she is complained of worsening back pain since a fall last week for which she was seen in another ED. He states she has been sleeping 12 to 15 hours/day and appears to hallucinate. She will be dazed and have conversations with herself. She has occasional jerking behaviors without any visualized seizure activity. States she was prescribed muscle relaxers after her previous fall but did not take these. She has complained of chills with mild cough without change from her baseline. Very poor oral intake without vomiting or diarrhea. Family states she is currently in the process of getting an outpatient work-up for possible lung cancer. She was seen in the outpatient office today where she was noted to have oxygen saturation in the mid 80s on room air and sent to the ED for further evaluation.       The HPI was provided by the patient. REVIEW OF SYSTEMS     Review of Systems   Unable to perform ROS: Mental status change       PAST MEDICAL HISTORY    has a past medical history of COPD (chronic obstructive pulmonary disease) (HCC) and High blood pressure. SURGICAL HISTORY      has a past surgical history that includes Tubal ligation (-); hernia repair (-); laparoscopy (?); Wrist surgery; and cyst removal.    CURRENT MEDICATIONS       Previous Medications    BUPRENORPHINE-NALOXONE (SUBOXONE) 8-2 MG FILM SL FILM    Place 1 Film under the tongue 2 times daily for 14 days. CITALOPRAM (CELEXA) 40 MG TABLET    Take 1 tablet by mouth once daily    CYCLOBENZAPRINE (FLEXERIL) 10 MG TABLET    Take 1 tablet by mouth 2 times daily as needed for Muscle spasms    FLUOXETINE (PROZAC) 10 MG CAPSULE    Take 1 capsule by mouth daily    GABAPENTIN (NEURONTIN) 300 MG CAPSULE    Take 300 mg by mouth 3 times daily. NALOXONE 4 MG/0.1ML LIQD NASAL SPRAY    1 spray by Nasal route as needed for Opioid Reversal    ONDANSETRON (ZOFRAN) 4 MG TABLET    TAKE 1 TABLET BY MOUTH THREE TIMES DAILY AS NEEDED FOR NAUSEA OR  VOMITING    TRIAMTERENE-HYDROCHLOROTHIAZIDE (MAXZIDE) 75-50 MG PER TABLET    Take 1 tablet by mouth daily. ALLERGIES     has No Known Allergies. FAMILY HISTORY     She indicated that her mother is . She indicated that her father is . family history includes Cancer in her father; Emphysema in her mother; Esophageal Cancer in her father; Heart Disease in her mother. SOCIAL HISTORY      reports that she has been smoking cigarettes. She started smoking about 41 years ago. She has a 40.00 pack-year smoking history. She has never used smokeless tobacco. She reports previous alcohol use. She reports previous drug use. Drug: Opiates . PHYSICAL EXAM     INITIAL VITALS:  oral temperature is 98.8 °F (37.1 °C). Her blood pressure is 82/52 (abnormal) and her pulse is 46 (abnormal).  Her respiration is 13 and oxygen saturation is 100%. Physical Exam  Vitals and nursing note reviewed. Constitutional:       Appearance: She is cachectic. Comments: Appears drowsy, eyes closed throughout most of exam though she does respond to gentle verbal stimuli   HENT:      Head: Normocephalic and atraumatic. Mouth/Throat:      Pharynx: Oropharynx is clear. Eyes:      Pupils: Pupils are equal, round, and reactive to light. Cardiovascular:      Rate and Rhythm: Regular rhythm. Bradycardia present. Pulmonary:      Effort: Pulmonary effort is normal. No respiratory distress. Breath sounds: No stridor. Wheezing present. No rhonchi. Abdominal:      Palpations: Abdomen is soft. Tenderness: There is no abdominal tenderness. Comments: Numerous palpable, round, mildly tender masses/nodules noted throughout the upper abdomen   Musculoskeletal:      Cervical back: Normal range of motion. Skin:     General: Skin is warm and dry. Neurological:      Mental Status: She is oriented to person, place, and time. GCS: GCS eye subscore is 4. GCS verbal subscore is 5. GCS motor subscore is 6. Cranial Nerves: No cranial nerve deficit or facial asymmetry. Sensory: No sensory deficit. Psychiatric:         Behavior: Behavior is cooperative. DIFFERENTIAL DIAGNOSIS:   Differential diagnoses are discussed    DIAGNOSTIC RESULTS     EKG: All EKG's are interpreted by the Emergency Department Physician who either signs or Co-signsthis chart in the absence of a cardiologist.    Kody Saldaña. Rate: 48 bpm  PRinterval: 156 ms  QRS duration: 74 ms  QTc: 443 ms  P-R-T axes: 74, 69, 74  Sinus bradycardia. No STEMI        RADIOLOGY: non-plain film images(s) such as CT, Ultrasound and MRI are read by the radiologist.    XR CHEST PORTABLE   Final Result   1. Normal heart size. No effusions. No alveolar infiltrates seen.    2. Slightly increased interstitial markings both upper lung fields, consistent with mild interstitial pneumonitis/fibrosis. Vladimir Brittle **This report has been created using voice recognition software. It may contain minor errors which are inherent in voice recognition technology. **      Final report electronically signed by Dr. Shital Huffman on 12/27/2021 5:38 PM      CT HEAD WO CONTRAST    (Results Pending)       LABS:      Labs Reviewed   CBC WITH AUTO DIFFERENTIAL - Abnormal; Notable for the following components:       Result Value    WBC 12.0 (*)     RBC 3.72 (*)     .2 (*)     MCH 33.3 (*)     RDW-SD 51.8 (*)     MPV 9.2 (*)     Segs Absolute 9.0 (*)     All other components within normal limits   COMPREHENSIVE METABOLIC PANEL W/ REFLEX TO MG FOR LOW K - Abnormal; Notable for the following components:    CREATININE 1.9 (*)     BUN 53 (*)     Sodium 134 (*)     Total Protein 5.9 (*)     Albumin 3.2 (*)     All other components within normal limits   BRAIN NATRIURETIC PEPTIDE - Abnormal; Notable for the following components:    Pro-BNP 1368.0 (*)     All other components within normal limits   SALICYLATE LEVEL - Abnormal; Notable for the following components:    Salicylate, Serum < 0.3 (*)     All other components within normal limits   PROCALCITONIN - Abnormal; Notable for the following components:    Procalcitonin 0.15 (*)     All other components within normal limits   GLOMERULAR FILTRATION RATE, ESTIMATED - Abnormal; Notable for the following components:    Est, Glom Filt Rate 27 (*)     All other components within normal limits   COVID-19, RAPID   CULTURE, BLOOD 1   CULTURE, BLOOD 2   TSH WITH REFLEX   TROPONIN   LIPASE   AMMONIA   ETHANOL   ACETAMINOPHEN LEVEL   CARBOXYHEMOGLOBIN   LACTATE, SEPSIS   ANION GAP   OSMOLALITY   URINE RT REFLEX TO CULTURE   URINE DRUG SCREEN       EMERGENCY DEPARTMENT COURSE:   Vitals:    Vitals:    12/27/21 1742 12/27/21 1803 12/27/21 1808 12/27/21 1816   BP: (!) 76/49 (!) 80/56 (!) 82/52    Pulse:  (!) 48 (!) 46    Resp:  13 13    Temp:       TempSrc: SpO2:   100% 100%      5:30 PM EST: The patient was seen and evaluated. Patient presents hypotensive, bradycardic and hypoxic with family for complaint of altered mentation and fatigue which has been ongoing primarily for the past week, worsened over the past couple of days. She is alert and oriented on my exam though her ability to provide some remote history is affected. She is treated with normal saline bolus and DuoNeb and was staffed with attending provider after my initial evaluation. Labs revealed negative Covid test.  White blood cell count 12,000 and acute kidney injury noted with current creatinine 1.9. Procalcitonin marginally elevated with lactic acid within normal limits. Tox labs are overall negative but patient does have carbon monoxide level of 14 and high flow nonrebreather mask was initiated. Family is currently reporting that the patient is the most alert they have seen her in the past couple days. They do report having a gas heater in their garage where the patient has been frequently smoking over the past week and they were recommended to have the fire department evaluate for any potential leak though the patient's significant smoking frequency over the past several days could certainly be contributing to her carbon monoxide level as well. Current results discussed with family and patient was signed out at end of shift pending remainder of laboratory results and admission. CRITICAL CARE:   There was a high probability of clinically significant life threatening deterioration in this patient's condition which required the urgent intervention of the Midlevel provider. Total critical care time was 40 minutes. This excludes any time for separately reportable procedures. CONSULTS:  None    PROCEDURES:  None    FINAL IMPRESSION      1. Altered mental status, unspecified altered mental status type    2.  Toxic effect of carbon monoxide, unintentional, initial encounter DISPOSITION/PLAN   Admit    PATIENT REFERRED TO:  No follow-up provider specified.     DISCHARGEMEDICATIONS:  New Prescriptions    No medications on file       (Please note that portions of this note were completedwith a voice recognition program.  Efforts were made to edit the dictations but occasionally words are mis-transcribed.)       Francisco Javier Del Cid PA-C  12/27/21 0792

## 2021-12-28 ENCOUNTER — APPOINTMENT (OUTPATIENT)
Dept: GENERAL RADIOLOGY | Age: 56
DRG: 917 | End: 2021-12-28
Payer: COMMERCIAL

## 2021-12-28 PROBLEM — R00.1 SYMPTOMATIC BRADYCARDIA: Status: ACTIVE | Noted: 2021-12-28

## 2021-12-28 PROBLEM — T58.91XA TOXIC EFFECT OF CARBON MONOXIDE, UNINTENTIONAL: Status: ACTIVE | Noted: 2021-12-28

## 2021-12-28 LAB
ALBUMIN SERPL-MCNC: 3.6 G/DL (ref 3.5–5.1)
ALP BLD-CCNC: 78 U/L (ref 38–126)
ALT SERPL-CCNC: 13 U/L (ref 11–66)
ANION GAP SERPL CALCULATED.3IONS-SCNC: 11 MEQ/L (ref 8–16)
AST SERPL-CCNC: 22 U/L (ref 5–40)
BASE EXCESS MIXED: -4.4 MMOL/L (ref -2–3)
BASE EXCESS MIXED: -4.8 MMOL/L (ref -2–3)
BASOPHILS # BLD: 0.4 %
BASOPHILS ABSOLUTE: 0.1 THOU/MM3 (ref 0–0.1)
BILIRUB SERPL-MCNC: 0.3 MG/DL (ref 0.3–1.2)
BUN BLDV-MCNC: 41 MG/DL (ref 7–22)
CALCIUM SERPL-MCNC: 8.8 MG/DL (ref 8.5–10.5)
CARBON MONOXIDE, BLOOD: 5.5 % CO SAT
CARBON MONOXIDE, BLOOD: 7 % CO SAT
CHLORIDE BLD-SCNC: 109 MEQ/L (ref 98–111)
CO2: 22 MEQ/L (ref 23–33)
COLLECTED BY:: ABNORMAL
COLLECTED BY:: ABNORMAL
CREAT SERPL-MCNC: 1.4 MG/DL (ref 0.4–1.2)
CREATININE URINE: 37.1 MG/DL
DEVICE: ABNORMAL
DEVICE: ABNORMAL
EKG ATRIAL RATE: 48 BPM
EKG P AXIS: 74 DEGREES
EKG P-R INTERVAL: 156 MS
EKG Q-T INTERVAL: 496 MS
EKG QRS DURATION: 74 MS
EKG QTC CALCULATION (BAZETT): 443 MS
EKG R AXIS: 69 DEGREES
EKG T AXIS: 74 DEGREES
EKG VENTRICULAR RATE: 48 BPM
EOSINOPHIL # BLD: 0.6 %
EOSINOPHILS ABSOLUTE: 0.1 THOU/MM3 (ref 0–0.4)
ERYTHROCYTE [DISTWIDTH] IN BLOOD BY AUTOMATED COUNT: 13.6 % (ref 11.5–14.5)
ERYTHROCYTE [DISTWIDTH] IN BLOOD BY AUTOMATED COUNT: 51.5 FL (ref 35–45)
FIO2, MIXED VENOUS: 15
FIO2, MIXED VENOUS: 3
FLU A ANTIGEN: NEGATIVE
FLU B ANTIGEN: NEGATIVE
GFR SERPL CREATININE-BSD FRML MDRD: 39 ML/MIN/1.73M2
GLUCOSE BLD-MCNC: 107 MG/DL (ref 70–108)
HCO3, MIXED: 23 MMOL/L (ref 23–28)
HCO3, MIXED: 24 MMOL/L (ref 23–28)
HCT VFR BLD CALC: 40.9 % (ref 37–47)
HEMOGLOBIN: 13.2 GM/DL (ref 12–16)
IMMATURE GRANS (ABS): 0.06 THOU/MM3 (ref 0–0.07)
IMMATURE GRANULOCYTES: 0.4 %
LV EF: 50 %
LVEF MODALITY: NORMAL
LYMPHOCYTES # BLD: 12.2 %
LYMPHOCYTES ABSOLUTE: 1.7 THOU/MM3 (ref 1–4.8)
MCH RBC QN AUTO: 32.8 PG (ref 26–33)
MCHC RBC AUTO-ENTMCNC: 32.3 GM/DL (ref 32.2–35.5)
MCV RBC AUTO: 101.5 FL (ref 81–99)
MONOCYTES # BLD: 7.5 %
MONOCYTES ABSOLUTE: 1 THOU/MM3 (ref 0.4–1.3)
MRSA SCREEN RT-PCR: NEGATIVE
NUCLEATED RED BLOOD CELLS: 0 /100 WBC
O2 SAT, MIXED: 56 %
O2 SAT, MIXED: 81 %
OSMOLALITY URINE: 362 MOSMOL/KG (ref 250–750)
PCO2, MIXED VENOUS: 51 MMHG (ref 41–51)
PCO2, MIXED VENOUS: 62 MMHG (ref 41–51)
PH, MIXED: 7.2 (ref 7.31–7.41)
PH, MIXED: 7.26 (ref 7.31–7.41)
PLATELET # BLD: 262 THOU/MM3 (ref 130–400)
PMV BLD AUTO: 8.9 FL (ref 9.4–12.4)
PO2 MIXED: 34 MMHG (ref 25–40)
PO2 MIXED: 56 MMHG (ref 25–40)
POTASSIUM SERPL-SCNC: 4.2 MEQ/L (ref 3.5–5.2)
PROCALCITONIN: 0.09 NG/ML (ref 0.01–0.09)
RBC # BLD: 4.03 MILL/MM3 (ref 4.2–5.4)
SEG NEUTROPHILS: 78.9 %
SEGMENTED NEUTROPHILS ABSOLUTE COUNT: 10.8 THOU/MM3 (ref 1.8–7.7)
SODIUM BLD-SCNC: 142 MEQ/L (ref 135–145)
SODIUM URINE: 109 MEQ/L
TOTAL PROTEIN: 5.7 G/DL (ref 6.1–8)
TROPONIN T: < 0.01 NG/ML
VANCOMYCIN RESISTANT ENTEROCOCCUS: NEGATIVE
WBC # BLD: 13.7 THOU/MM3 (ref 4.8–10.8)

## 2021-12-28 PROCEDURE — 87804 INFLUENZA ASSAY W/OPTIC: CPT

## 2021-12-28 PROCEDURE — 2580000003 HC RX 258: Performed by: NURSE PRACTITIONER

## 2021-12-28 PROCEDURE — APPNB180 APP NON BILLABLE TIME > 60 MINS: Performed by: NURSE PRACTITIONER

## 2021-12-28 PROCEDURE — 87081 CULTURE SCREEN ONLY: CPT

## 2021-12-28 PROCEDURE — 84145 PROCALCITONIN (PCT): CPT

## 2021-12-28 PROCEDURE — 84484 ASSAY OF TROPONIN QUANT: CPT

## 2021-12-28 PROCEDURE — 87500 VANOMYCIN DNA AMP PROBE: CPT

## 2021-12-28 PROCEDURE — 2500000003 HC RX 250 WO HCPCS

## 2021-12-28 PROCEDURE — 82375 ASSAY CARBOXYHB QUANT: CPT

## 2021-12-28 PROCEDURE — 84300 ASSAY OF URINE SODIUM: CPT

## 2021-12-28 PROCEDURE — 6370000000 HC RX 637 (ALT 250 FOR IP): Performed by: NURSE PRACTITIONER

## 2021-12-28 PROCEDURE — 87641 MR-STAPH DNA AMP PROBE: CPT

## 2021-12-28 PROCEDURE — 71045 X-RAY EXAM CHEST 1 VIEW: CPT

## 2021-12-28 PROCEDURE — 83935 ASSAY OF URINE OSMOLALITY: CPT

## 2021-12-28 PROCEDURE — 2000000000 HC ICU R&B

## 2021-12-28 PROCEDURE — 80053 COMPREHEN METABOLIC PANEL: CPT

## 2021-12-28 PROCEDURE — 36592 COLLECT BLOOD FROM PICC: CPT

## 2021-12-28 PROCEDURE — 99223 1ST HOSP IP/OBS HIGH 75: CPT | Performed by: INTERNAL MEDICINE

## 2021-12-28 PROCEDURE — 85025 COMPLETE CBC W/AUTO DIFF WBC: CPT

## 2021-12-28 PROCEDURE — 6360000002 HC RX W HCPCS: Performed by: NURSE PRACTITIONER

## 2021-12-28 PROCEDURE — 82803 BLOOD GASES ANY COMBINATION: CPT

## 2021-12-28 PROCEDURE — 82570 ASSAY OF URINE CREATININE: CPT

## 2021-12-28 PROCEDURE — 93306 TTE W/DOPPLER COMPLETE: CPT

## 2021-12-28 RX ORDER — BUPRENORPHINE AND NALOXONE 8; 2 MG/1; MG/1
1 FILM, SOLUBLE BUCCAL; SUBLINGUAL 2 TIMES DAILY
Status: DISCONTINUED | OUTPATIENT
Start: 2021-12-28 | End: 2022-01-03 | Stop reason: HOSPADM

## 2021-12-28 RX ORDER — SODIUM CHLORIDE 9 MG/ML
INJECTION, SOLUTION INTRAVENOUS CONTINUOUS
Status: DISCONTINUED | OUTPATIENT
Start: 2021-12-28 | End: 2021-12-30

## 2021-12-28 RX ORDER — POLYETHYLENE GLYCOL 3350 17 G/17G
17 POWDER, FOR SOLUTION ORAL DAILY PRN
Status: DISCONTINUED | OUTPATIENT
Start: 2021-12-28 | End: 2022-01-03 | Stop reason: HOSPADM

## 2021-12-28 RX ORDER — ACETAMINOPHEN 650 MG/1
650 SUPPOSITORY RECTAL EVERY 6 HOURS PRN
Status: DISCONTINUED | OUTPATIENT
Start: 2021-12-28 | End: 2022-01-03 | Stop reason: HOSPADM

## 2021-12-28 RX ORDER — SODIUM CHLORIDE 9 MG/ML
25 INJECTION, SOLUTION INTRAVENOUS PRN
Status: DISCONTINUED | OUTPATIENT
Start: 2021-12-28 | End: 2022-01-03 | Stop reason: HOSPADM

## 2021-12-28 RX ORDER — ONDANSETRON 4 MG/1
4 TABLET, ORALLY DISINTEGRATING ORAL EVERY 8 HOURS PRN
Status: DISCONTINUED | OUTPATIENT
Start: 2021-12-28 | End: 2022-01-03 | Stop reason: HOSPADM

## 2021-12-28 RX ORDER — ONDANSETRON 2 MG/ML
4 INJECTION INTRAMUSCULAR; INTRAVENOUS EVERY 6 HOURS PRN
Status: DISCONTINUED | OUTPATIENT
Start: 2021-12-28 | End: 2022-01-03 | Stop reason: HOSPADM

## 2021-12-28 RX ORDER — ACETAMINOPHEN 325 MG/1
650 TABLET ORAL EVERY 6 HOURS PRN
Status: DISCONTINUED | OUTPATIENT
Start: 2021-12-28 | End: 2022-01-03 | Stop reason: HOSPADM

## 2021-12-28 RX ORDER — SODIUM CHLORIDE 0.9 % (FLUSH) 0.9 %
5-40 SYRINGE (ML) INJECTION EVERY 12 HOURS SCHEDULED
Status: DISCONTINUED | OUTPATIENT
Start: 2021-12-28 | End: 2022-01-03 | Stop reason: HOSPADM

## 2021-12-28 RX ORDER — SODIUM CHLORIDE 0.9 % (FLUSH) 0.9 %
5-40 SYRINGE (ML) INJECTION PRN
Status: DISCONTINUED | OUTPATIENT
Start: 2021-12-28 | End: 2022-01-03 | Stop reason: HOSPADM

## 2021-12-28 RX ORDER — NOREPINEPHRINE BIT/0.9 % NACL 16MG/250ML
INFUSION BOTTLE (ML) INTRAVENOUS
Status: COMPLETED
Start: 2021-12-28 | End: 2021-12-28

## 2021-12-28 RX ORDER — NOREPINEPHRINE BIT/0.9 % NACL 16MG/250ML
2-100 INFUSION BOTTLE (ML) INTRAVENOUS CONTINUOUS
Status: DISCONTINUED | OUTPATIENT
Start: 2021-12-28 | End: 2021-12-30

## 2021-12-28 RX ADMIN — SODIUM CHLORIDE, PRESERVATIVE FREE 10 ML: 5 INJECTION INTRAVENOUS at 21:20

## 2021-12-28 RX ADMIN — Medication 2 MCG/MIN: at 02:04

## 2021-12-28 RX ADMIN — BUPRENORPHINE AND NALOXONE 1 FILM: 8; 2 FILM BUCCAL; SUBLINGUAL at 21:20

## 2021-12-28 RX ADMIN — SODIUM CHLORIDE: 9 INJECTION, SOLUTION INTRAVENOUS at 15:44

## 2021-12-28 RX ADMIN — SODIUM CHLORIDE, PRESERVATIVE FREE 10 ML: 5 INJECTION INTRAVENOUS at 12:27

## 2021-12-28 RX ADMIN — ENOXAPARIN SODIUM 30 MG: 100 INJECTION SUBCUTANEOUS at 12:23

## 2021-12-28 RX ADMIN — SODIUM CHLORIDE: 9 INJECTION, SOLUTION INTRAVENOUS at 03:03

## 2021-12-28 RX ADMIN — SODIUM CHLORIDE: 9 INJECTION, SOLUTION INTRAVENOUS at 07:42

## 2021-12-28 RX ADMIN — ONDANSETRON 4 MG: 4 TABLET, ORALLY DISINTEGRATING ORAL at 12:29

## 2021-12-28 ASSESSMENT — PAIN SCALES - GENERAL: PAINLEVEL_OUTOF10: 0

## 2021-12-28 NOTE — H&P
CRITICAL CARE PROGRESS NOTE      Patient:  Serafin Carmen    Unit/Bed:38/038A  YOB: 1965  MRN: [id number]   PCP: ENMANUEL Mcwilliams CNP  Date of Admission: 12/27/2021  Chief Complaint:- altered mental status and fatigue    Assessment and Plan:    1. Acute carbon monoxide poisoning: Likely late presentation. Complaint of headache weakness dizziness bradycardia. Family identifies hypoxia and confusion for the past 4 to 5 days prior to evaluation. 100% nonrebreather mask has been applied 12/27/2021 Carbon monoxide level 13.9 repeat is pending. 2. Symptomatic bradycardia: Likely secondary to acute carbon monoxide poisoning. 12/27/2021 patient was given atropine with minimal results. Patient was placed on dopamine with minimal results. 12/28/2021 TVP was placed. Troponin was less than 0.010. TSH was 0.822. EKG prior to DVT no STEMI noted. Echo has been ordered. 3. Acute encephalopathy: 12/27/2021 CT head no acute intracranial process. No acute infarct mass lesion or intracranial hemorrhage is seen. Likely secondary to above. 4. Acute on chronic kidney injury:  (baseline Crea 1.2). Family identifies history of nausea and vomiting and poor appetite prior to ER evaluation. 12/27/2021 urine negative for blood and protein. IV fluids will be continued. Repeat BMP is pending. Dose medications to GFR no nonsteroidals are to be given. 5. Severe COPD:  2011-FEV1 equal 46%, stable, monitor  6. Essential hypertension: History, Levophed is currently on to maintain mean arterial pressure greater than 65.  7. Severe opiate disorder: Patient is currently on Suboxone twice daily dosing. Family identifies that she was being transitioned off of Celexa to Paxil. Patient did not understand these instructions and was not following the taper dosing. 8. Current everyday smoker: Smoking sensation  9. Chronic back pain: Monitor  10. Macrocytosis: No anemia present, monitor.   11. Underweight: BMI is 17. 2    INITIAL H AND P AND ICU COURSE:  Ankit Linares is a 60year old causasian female admitted to [facility] ICU 12/28/2021 s/p TVP placement for symptomatic bradycardia. Patient has a significant history of current every day smoker, Severe COPD 2011 FEV1=46%, Essential HPTN, Severe Opioid disorder, Mood disorder. Gloria Vasquez presented to [facility] ER 12/27/2021 for the evaluation of altered mental status. History obtained primarily from family at bedside,  and children. Patient is able to state that she is not having any current pain. She acknowledges some difficulty with her memory. Family reports that the patient has been in the process of weaning off of her Celexa but did not understand the instructions and weaned off over the course of 2 weeks instead of 4. She did take a half dose yesterday and today hoping it would help her symptoms. Family denies any concern for intentional overuse/abuse. States she smokes 2 packs/day and uses marijuana. Reports rare alcohol use but she did have 2 drinks on Diamond and feel her symptoms escalated after that. She has chronic pain which is generalized but she is complained of worsening back pain since a fall last week for which she was seen in another ED. He states she has been sleeping 12 to 15 hours/day and appears to hallucinate. She will be dazed and have conversations with herself. She has occasional jerking behaviors without any visualized seizure activity. States she was prescribed muscle relaxers after her previous fall but did not take these. She has complained of chills with mild cough without change from her baseline. Very poor oral intake without vomiting or diarrhea. Family states she is currently in the process of getting an outpatient work-up for possible lung cancer. She was seen in the outpatient office today where she was noted to have oxygen saturation in the mid 80s on room air and sent to the ED for further evaluation.     Family identify home monitoring of SaO2 for the past 4-5 days and \"high 80's\". 2021 While in the ER patient was found to be bradycardic, lethargic and hypotensive. Atropine was given and Dopamine gtt was started. Narcan 0.4 X1 given. An elevated carbonmonoxide level was detected and patient was placed on 100% NRB Mask. Bradycardia continued despite Dopamine gtt at 7 m/k/m. TVP was placed and patient was admitted to the ICU for further management and care. Past Medical History:  See HPI. Family History: Mother -CAD, emphysema, father-, esophageal cancer. Social History: Current everyday smoker, denies any alcohol, or illicit drug use. Patient is     ROS   GENERAL: Fatigue  SKN: No lesions or rashes. HEAD: No headaches or recent injury  EYES: No acute changes in vision, no diplopia or blurred vision, no drainage  EARS: No hearing loss, no tinnitus, no drainage  NOSE/THROAT: No rhinorrhea or pharyngitis, no nasal drainage  NECK: No lumps or unusual neck stiffness  PULMONARY: No dyspnea, orthopnea or paroxysmal nocturnal dyspnea, no stridor wheezing or cough  CARDIAC: As it of for bradycardia and hypotension  GI: No history melena or hematochezia, no diarrhea or constipation  PERIPHERAL VASCULAR: No intermittent claudication or unusual leg cramps  MUSCULOSKELETAL: Occasional arthralgias, myalgias  NEUROLOGICAL: Positive for altered mental status, no Seizures or Syncope  HEMATOLOGIC:  No unusual bruising or bleeding  PSYCH: No homicidal or suicidal ideations    Scheduled Meds:   atropine  0.4 mg IntraVENous Once     Continuous Infusions:   DOPamine 5 mcg/kg/min (21 0111)       PHYSICAL EXAMINATION:  T: 98.2.  P: 60. RR: 16. B/P: 98/62. FiO2: 100. O2 Sat: 100. I/O: Pending  There is no height or weight on file to calculate BMI.   GCS:   13  General:   Pale acutely ill cachectic  HEENT:  normocephalic and atraumatic. No scleral icterus. PERR  Neck: supple. No Thyromegaly.   Lungs: clear to auscultation-diminished throughout. No retractions  Cardiac: RRR. No JVD. Abdomen: soft. Nontender. Extremities:  No clubbing, cyanosis, or edema x 4. Vasculature: capillary refill < 3 seconds. Palpable dorsalis pedis pulses. Skin:  warm and dry. Psych:  Alert and oriented x3. Affect appropriate  Lymph:  No supraclavicular adenopathy. Neurologic:  No focal deficit. No seizures. Data: (All radiographs, tracings, PFTs, and imaging are personally viewed and interpreted unless otherwise noted).  12/27/2021 1715 7 with a potassium 4.4) 3 CO2 23   BUN is 53 creatinine is 1.9   Lactic acid 0.6   Glucose 92   Procalcitonin 0.15   P BNP 1368   Troponin is less than 0.010   Albumin 3.2 alk phos is 70 ALT 13   Ammonia is 20   AST 22 total bili 0.4   TSH is 0.822   Alcohol level is less than 0.01   Count 12.0 hemoglobin 12.4 hematocrit 30.0 platelet count 401   Covid-19-negative   Urine yellow negative for nitrates negative for leukocytes   12/27/2021 1733 Carbon monoxide 13.9    Urine drug screen positive for cannabinoid   12/27/2021 CT head no acute intracranial process. No acute infarction mass lesion or intracranial hemorrhage is seen. Seen with multidisciplinary ICU team yes.   Meets Continued ICU Level Care Criteria:    [x] Yes   [] No - Transfer Planned to listed location:  [] HOSPITALIST CONTACTED-      Case and plan discussed with Dr. Kailash Amaral      Electronically signed by ENMANUEL Shaikh 23

## 2021-12-28 NOTE — PROGRESS NOTES
Patient's  expresses concern of patient's weight loss at home and her drowsiness. Patient has refused to eat for this RN and has become less sleepy as the day progressed.  Gave the patient jello and pop at the bedside and the  is encouraging her to eat

## 2021-12-28 NOTE — ED NOTES
Jaki Singh CNP at bedside assessing pt. Pt continues to have hypotension. Verbal order received for 1000ml 0.9% NS bolus, dopamine infusion, and 0.5mg atropine push.       Doc JULIAN Centeno  12/27/21 1922
Prabhakar SINGH at bedside and gave verbal order to place pt on non-rebreather at 15L. Prabhakar speaking with pt and family informing on carboxyhemoglobin results. Family states that pt has been in the garage the past few days smoking cigarettes frequently. They also state there is a gas heater out in the garage she has been using while out there.       Benita Muller RN  12/27/21 9473
Provider at the bedside talking to family about temporary pacemaker procedure.      Jaqui Cardoso RN  12/28/21 0005
Providers at the bedside for central line procedure     Adarsh Becker RN  12/27/21 4465
Pt alert and nauseated. Pt had e,esis episode. Provider notified and verbal order for 4mg IV zofran given.      Godfrey Bell, RN  12/27/21 5020
Pt returned from CT scan. Dr. Mandy Downs at bedside speaking to pt and family.       Brayan Layne RN  12/27/21 8067
Pt transport to ICU stable without complications.      Katina Junior RN  12/28/21 7284
Pt transported to CT scan. During this she woke up more asking questions like \"Where am I/ Why am I here? \". She was reoiriented quickly.       Valente Shultz RN  12/27/21 1885
RN in another pt's room and Pt kinked tubing and IV infusion paused due to occlusion. Pt hypotensive, infusion restarted, RN in room.      Vira Santiago RN  12/27/21 3061
Temporary pacemaker procedure starting.  Dr. Nuvia Warren at the bedside     Billy Ward RN  12/28/21 9727
This RN and ICU RN transporting pt up to the floor. Bedside Report to ICU RN.      Adarsh Becker RN  12/28/21 1321
Upon first contact with patient this RN receives bedside shift report. Patient lethargic, bradycardic, and hypotensive. Orders being placed to help stablize vitals. Pt on NRB, spo2 WDL, airway intact.       Rose Mary Guerra RN  12/27/21 2002
Xray at the bedside.      Mehran Muller RN  12/27/21 1018
balloon inflated, VBI, catheter at 28. ouput: 0.8 milliamps, HR 64  Balloon deflated.        Brandy Vieira RN  12/28/21 6554
Yes, Attending Physician has been notified

## 2021-12-28 NOTE — ED PROVIDER NOTES
Central Line    Date/Time: 12/28/2021 1:39 AM  Performed by: Phil Portillo MD  Authorized by: Talon Boyd DO     Consent:     Consent obtained:  Verbal and written    Consent given by:  Patient and spouse    Risks discussed:  Arterial puncture, incorrect placement, nerve damage, pneumothorax, infection and bleeding    Alternatives discussed:  No treatment, delayed treatment and alternative treatment  Pre-procedure details:     Hand hygiene: Hand hygiene performed prior to insertion      Sterile barrier technique: All elements of maximal sterile technique followed      Skin preparation:  2% chlorhexidine    Skin preparation agent: Skin preparation agent completely dried prior to procedure    Anesthesia (see MAR for exact dosages): Anesthesia method:  Local infiltration    Local anesthetic:  Lidocaine 1% w/o epi  Procedure details:     Location:  R subclavian    Patient position:  Flat    Procedural supplies:  Triple lumen    Catheter size:  8 Fr    Landmarks identified: yes      Number of attempts:  1    Successful placement: yes    Post-procedure details:     Post-procedure:  Dressing applied and line sutured    Assessment:  Blood return through all ports, free fluid flow, no pneumothorax on x-ray and placement verified by x-ray    Patient tolerance of procedure: Tolerated well, no immediate complications  Central Line    Date/Time: 12/28/2021 1:42 AM  Performed by: Phil Portillo MD  Authorized by: Talon Boyd DO     Consent:     Consent obtained:  Written    Consent given by:  Patient    Risks discussed:  Arterial puncture, incorrect placement, nerve damage, pneumothorax, infection and bleeding    Alternatives discussed:  No treatment  Pre-procedure details:     Hand hygiene: Hand hygiene performed prior to insertion      Sterile barrier technique:  All elements of maximal sterile technique followed      Skin preparation:  2% chlorhexidine    Skin preparation agent: Skin preparation agent completely dried prior to procedure    Anesthesia (see MAR for exact dosages): Anesthesia method:  Local infiltration    Local anesthetic:  Lidocaine 1% w/o epi  Procedure details:     Location:  R internal jugular    Procedural supplies:  Single lumen (transvenous pacemaker)    Catheter size:  8 Fr    Landmarks identified: yes      Ultrasound guidance: yes      Number of attempts:  1    Successful placement: yes    Post-procedure details:     Post-procedure:  Dressing applied and line sutured    Assessment:  Blood return through all ports, no pneumothorax on x-ray, placement verified by x-ray and free fluid flow    Patient tolerance of procedure:   Tolerated well, no immediate complications  Comments:      Paced at 64 bpm, 0.8 milliamps, sensitivity 1.5 mv 35 depth          Elly Harper MD  Resident  12/28/21 0083

## 2021-12-28 NOTE — CARE COORDINATION
12/28/21, 9:33 AM EST  DISCHARGE PLANNING EVALUATION:    Mariama Segura       Admitted: 12/27/2021/ 1627   Hospital day: 0   Location: 4D-12/012-A Reason for admit: Anorexia [R63.0]  Bradycardia [R00.1]  SHARONA (acute kidney injury) ([facility] [address] [N17.9]  Symptomatic bradycardia [R00.1]  Toxic effect of carbon monoxide, unintentional, initial encounter [T58.91XA]  Hypotension, unspecified hypotension type [I95.9]  Altered mental status, unspecified altered mental status type [R41.82]   PMH:  has a past medical history of COPD (chronic obstructive pulmonary disease) ([facility] [address] and High blood pressure. Procedure:   12/27 CXR: Slightly increased interstitial markings both upper lung fields, consistent with mild interstitial pneumonitis/fibrosis  12/27 CT Head: No acute findings  12/28 TVP placed  12/28 CVC right subclavian  12/28 CXR:   1. Right subclavian line distal tip overlying superior vena cava. Right    central line with distal tip likely overlying the right ventricle. No    pneumothorax. 2. Chronic appearing densities bilaterally without acute cardiopulmonary    process. Barriers to Discharge: Presented to ED for AMS. Family reports she had been in the process of weaning off her Celexa but did not understand instructions and weaned off over 2 weeks instead of 4 weeks. Reports worsening back pain since a fall last week & had been seen in another ED after fall. Reports she has been sleeping 12-15 hours/day and appears to hallucinate. She has had poor oral intake, occasional jerking movement without visualized seizure activity. Family reports O2 sats at home the past 4-5 days were in the \"high 80's\" and was having confusion. She was seen in OP office and sent to ED d/t O2 sats in mid 80's on room air. In ED, found to be bradycardic, lethargic, and hypotensive with c/o headache, weakness, and dizziness. Atropine given and started on dopamine drip. Narcan given x1.  Noted to have elevated carbon monoxide level and placed on 100% NRB mask. Bradycardia continued and TVP placed. Admitted to ICU. SHARONA on CKD. Dopamine switched to levophed drip. Cardiology consulted. Afebrile. VPaced. Sats 100% on NRB mask. Ox4. Follows commands. Telemetry, TVP, CVC, n/v checks, SCDs. IVF, levo @ 5 mcg/min, suboxone - held today d/t lethargy, lovenox. Received 3L in fld bolus. Na+ 134 - now 142, BUN 53 - now 41, Creat 1.9 -now 1.4, procal 0.15 - then 0.09, pro-bnp 1368, trop neg x2, wbc 12 - now 13.7. Urine tox +cannabinoids. Rapid flu and COVID 19 were negative. Blood cultures sent. PCP: ENMANUEL Salmeron CNP  Readmission Risk Score: 14.7 ( )%    Patient Goals/Plan/Treatment Preferences: Spoke with Sarai's son and daughter at bedside, patient was resting. They state Rj Holcomb lives at home with her  and did not use any DME PTA. She drives, cares for herself independently - up until the last few days, and has a PCP. Plan is for Rj Holcomb to return home at discharge. They state Rj Holcomb may need home oxygen at discharge. Daughter also states they think Rj Holcomb has sleep apnea and may need cpap; explained cpap testing is done in OP setting and she verbalizes understanding. Continue to monitor for needs as course progresses. Will need PT/OT when TVP out. Transportation/Food Security/Housekeeping Addressed:  No issues identified.

## 2021-12-28 NOTE — PROGRESS NOTES
Physician Progress Note      PATIENT:               Flaca Quezada  CSN #:                  [id number]  :                       1965  ADMIT DATE:       2021 4:27 PM  [address] DATE:  RESPONDING  PROVIDER #:        DEON MASTERS          QUERY TEXT:    Pt admitted with carbon monoxide poisoning  and has encephalopathy documented. If possible, please document in progress notes and discharge summary further   specificity regarding the type of encephalopathy:    The medical record reflects the following:    Risk Factors: carbon monoxide poisoning  Clinical Indicators: presents with AMS, dazed, having conversations with   herself, carbon monoxide level 13.9 on admit, 7.0, 5.5  Treatment: 100% NRB mask, monitoring    Thank you! Han Hackett, DAO FelizR  RN Clinical   P: [phone]  Options provided:  -- Metabolic encephalopathy  -- Toxic encephalopathy  -- Toxic metabolic encephalopathy  -- Other - I will add my own diagnosis  -- Disagree - Not applicable / Not valid  -- Disagree - Clinically unable to determine / Unknown  -- Refer to Clinical Documentation Reviewer    PROVIDER RESPONSE TEXT:    Provider disagreed with this query.     Query created by: Merle Pleitez on 2021 12:20 PM      Electronically signed by:  Naomy Man 2021 4:05 PM

## 2021-12-28 NOTE — FLOWSHEET NOTE
12/28/21 1510   Encounter Summary   Services provided to: Patient   Continue Visiting Yes  (12/28 NR)   Complexity of Encounter Low   Length of Encounter 15 minutes   Routine   Type Follow up   Assessment Unable to respond   Intervention Prayer   In my encounter with the 60  yr old patient, I attempted to see the patient, but they were unresponsive at this time. No family was present in the room. I offered a prayer at the pts side. A  will attempt to see the patient at a later time as a follow up. The pt was admitted due to symptomatic bradycardia/ heart problems.

## 2021-12-28 NOTE — ED PROVIDER NOTES
[address]     Pt Name: Larry Villatoro  MRN: [id number]  [place] 1965  Date of evaluation: 12/28/21        Mid-level provider Note:    I have personally performed and/or participated in the history, exam and medical decision making and agree with all pertinent clinical information as noted by the previous provider. I have also reviewed and agree with the past medical, family and social history unless otherwise noted. I have personally performed a face to face diagnostic evaluation on this patient. I have reviewed the previous provider's findings and agree. Evaluation:  I assumed care of this patient from [place], [place] pending admission. The patient is found to have an elevated Carboxyhemoglobin. She is placed on NRB. Her BP and HR are not responding to IVF so she is given 0.4 narcan and then 1/2 amp of atropine. She is then started on a dopamine gtt. Discussed with Dr. Davonte Em. The resident placed an central line. Consulted with Hospitalist and ICU about admission. They agreed the patient would go to ICU and Dr. Davonte Em and resident placed a transvenous pacer. ED Course as of 12/28/21 0137   Tue Dec 28, 2021   0016 Eric Conception PA for cardiology [KJ]      ED Course User Index  [KJ] ENMANUEL Trent - CNP       XR LUMBAR SPINE (2-3 VIEWS)    Result Date: 12/23/2021  PROCEDURE: XR LUMBAR SPINE (2-3 VIEWS) CLINICAL INFORMATION: left paraspinal pain post fall COMPARISON: 10/6/2021 TECHNIQUE: AP and lateral views performed. FINDINGS: There is dextroscoliosis. Mild lumbar spondylosis. Mild multilevel facet arthrosis. Vascular calcifications. No acute fracture or dislocation. Lumbar lordosis is maintained. . 5 lumbar vertebrae are seen. 1. No acute bony abnormality. 2. Mild lumbar spondylosis. 3. Dextroscoliosis. **This report has been created using voice recognition software.   It may contain minor errors which are inherent in voice recognition technology. ** Final report electronically signed by Dr Jaswant Malik on 12/23/2021 3:52 PM    CT HEAD WO CONTRAST    Result Date: 12/27/2021  PROCEDURE: NONCONTRAST CT BRAIN CLINICAL INFORMATION: AMS COMPARISON: No prior study TECHNIQUE: Multiple axial 5 mm images of the brain were obtained without administration of intravenous contrast material. ALL CT SCANS AT THIS FACILITY use dose modulation, iterative reconstruction, and/or weight-based dosing when appropriate to reduce radiation dose to as low as reasonably achievable. FINDINGS: VENTRICLES: Normal in size, contour, position. .. PARENCHYMA:  No acute infarction, mass lesion, or intracranial hemorrhage is seen. MASTOID PROCESSES: Well aerated. Normal in appearance. Jarad Miyamoto PARANASAL SINUSES/CALVARIUM: Unremarkable     No acute intracranial process. . **This report has been created using voice recognition software. It may contain minor errors which are inherent in voice recognition technology. ** Final report electronically signed by Dr. Antonio Canela on 12/27/2021 6:20 PM    XR CHEST PORTABLE    Result Date: 12/27/2021  PROCEDURE: XR CHEST PORTABLE CLINICAL INFORMATION: central line placement confirmation COMPARISON: 12/27/2021 TECHNIQUE: A single mobile view of the chest was obtained. 1. Normal heart size. Right subclavian line has been inserted with tip in superior vena cava. No pneumothorax is seen. 2. Findings suggestive of mild interstitial pneumonitis/fibrosis both upper lung fields and possibly also right lung base at this time. **This report has been created using voice recognition software. It may contain minor errors which are inherent in voice recognition technology. ** Final report electronically signed by Dr. Antonio Canela on 12/27/2021 11:16 PM    XR CHEST PORTABLE    Result Date: 12/27/2021  PROCEDURE: XR CHEST PORTABLE CLINICAL INFORMATION: SOB COMPARISON: 10/3/2011 TECHNIQUE: A single mobile view of the chest was obtained.      1. Normal heart size. No effusions. No alveolar infiltrates seen. 2. Slightly increased interstitial markings both upper lung fields, consistent with mild interstitial pneumonitis/fibrosis. Bernadette Rued **This report has been created using voice recognition software. It may contain minor errors which are inherent in voice recognition technology. ** Final report electronically signed by Dr. Suzy Dorado on 12/27/2021 5:38 PM        DIAGNOSIS  1. Altered mental status, unspecified altered mental status type    2. Toxic effect of carbon monoxide, unintentional, initial encounter    3. Hypotension, unspecified hypotension type    4. Bradycardia    5. SHARONA (acute kidney injury) ([facility] [address]    6. Anorexia           DISPOSITION/PLAN  PATIENT REFERRED TO:  No follow-up provider specified.   DISCHARGE MEDICATIONS:  New Prescriptions    No medications on file         ENMANUEL Cho CNP, APRN - CNP  12/28/21 0140

## 2021-12-28 NOTE — CONSULTS
[facility] [address] [place] [address]  Cardiology Consult      Patient:  Steven Khan  YOB: 1965  MRN: [id number]     Acct: [de-identified]    PCP: ENMANUEL Karimi CNP    Date of Admission: 12/27/2021      Reason for Consultation:  Symptomatic bradycardia      History Of Present Illness:    60 y.o. pleasant female who presents to the Emergency Department on 12/27/2021 for the evaluation of altered mental status. History obtained primarily from family at bedside,  and children. Patient is able to state that she is not having any current pain. She acknowledges some difficulty with her memory. Family reports that the patient has been in the process of weaning off of her Celexa but did not understand the instructions and weaned off over the course of 2 weeks instead of 4. She did take a half dose yesterday and today hoping it would help her symptoms. Family denies any concern for intentional overuse/abuse. States she smokes 2 packs/day and uses marijuana. Reports rare alcohol use but she did have 2 drinks on Diamond and feel her symptoms escalated after that. She has chronic pain which is generalized but she is complained of worsening back pain since a fall last week for which she was seen in another ED. He states she has been sleeping 12 to 15 hours/day and appears to hallucinate. She will be dazed and have conversations with herself. She has occasional jerking behaviors without any visualized seizure activity. States she was prescribed muscle relaxers after her previous fall but did not take these. She has complained of chills with mild cough without change from her baseline. Very poor oral intake without vomiting or diarrhea. Family states she is currently in the process of getting an outpatient work-up for possible lung cancer.   She was seen in the outpatient office today where she was noted to have oxygen saturation in the mid 80s on room air and sent to the ED for further evaluation. 12/28/2021: Overnight, patient continued to have hypotension and was initially given 1000mL 0.9% NS bolus, dopamine infusion and 0.5mg atropine push. Dopamine switched to levophed. Temporary pacemaker was placed at bedside. Today, she completed her echo and are waiting on results. A thorough evaluation was not completed at bedside today as she was drowsy and wanted to sleep. Attempts at pacemaker removal were made earlier this AM but patient's heart rate dropped into 40s at those times. Current BP is 95/65. Carboxyhemoglobin level at 5.5 this AM. WBC increased from 12 to 13.7. Creatinine improved to 1.4. Past Medical History:          Diagnosis Date    COPD (chronic obstructive pulmonary disease) ([facility] [address]     High blood pressure        Past Surgical History:          Procedure Laterality Date    CYST REMOVAL      vagina    HERNIA REPAIR  4-2002    LAPAROSCOPY  1994?  TUBAL LIGATION  4-2002    WRIST SURGERY         Medications Prior to Admission:      Prior to Admission medications    Medication Sig Start Date End Date Taking? Authorizing Provider   cyclobenzaprine (FLEXERIL) 10 MG tablet Take 1 tablet by mouth 2 times daily as needed for Muscle spasms 12/23/21 1/2/22  Sherry Farah MD   buprenorphine-naloxone (SUBOXONE) 8-2 MG FILM SL film Place 1 Film under the tongue 2 times daily for 14 days. 12/14/21 12/28/21  ENMANUEL Stevens CNP   FLUoxetine (PROZAC) 10 MG capsule Take 1 capsule by mouth daily 12/14/21   ENMANUEL Stevens - CNP   ondansetron (ZOFRAN) 4 MG tablet TAKE 1 TABLET BY MOUTH THREE TIMES DAILY AS NEEDED FOR NAUSEA OR  VOMITING 12/9/21   ENMANUEL Stevens CNP   citalopram (CELEXA) 40 MG tablet Take 1 tablet by mouth once daily 12/8/21   ENMANUEL Stevens - CNP   gabapentin (NEURONTIN) 300 MG capsule Take 300 mg by mouth 3 times daily.     Historical Provider, MD   naloxone 4 MG/0.1ML LIQD nasal spray 1 spray by Nasal route as needed for Opioid Reversal 10/6/21   ENMANUEL Mccord CNP   triamterene-hydrochlorothiazide (MAXZIDE) 75-50 MG per tablet Take 1 tablet by mouth daily.       Historical Provider, MD       Current Facility-Administered Medications   Medication Dose Route Frequency Provider Last Rate Last Admin    norepinephrine (LEVOPHED) 16 mg in sodium chloride 0.9 % 250 mL infusion  2-100 mcg/min IntraVENous Continuous ENMANUEL Davies - CNP 4.7 mL/hr at 12/28/21 0739 5 mcg/min at 12/28/21 0739    buprenorphine-naloxone (SUBOXONE) 8-2 MG SL film 1 Film  1 Film SubLINGual BID Betsey Zamudio APRN - CNP        sodium chloride flush 0.9 % injection 5-40 mL  5-40 mL IntraVENous 2 times per day Betsey Zamudio APRN - CNP        sodium chloride flush 0.9 % injection 5-40 mL  5-40 mL IntraVENous PRN Betsey Zamudio, APRN - CNP        0.9 % sodium chloride infusion  25 mL IntraVENous PRN Betsey Zamudio APRN - CNP        enoxaparin (LOVENOX) injection 30 mg  30 mg SubCUTAneous Daily Betsey Zamudio APRN - HERACLIO        ondansetron (ZOFRAN-ODT) disintegrating tablet 4 mg  4 mg Oral Q8H PRN Betsey Zamudio, APRN - CNP        Or    ondansetron (ZOFRAN) injection 4 mg  4 mg IntraVENous Q6H PRN Betsey Zamudio APRN - CNP        polyethylene glycol (GLYCOLAX) packet 17 g  17 g Oral Daily PRN Betsey Zamudio APRN - CNP        acetaminophen (TYLENOL) tablet 650 mg  650 mg Oral Q6H PRN Betsey Zamudio, APRN - CNP        Or    acetaminophen (TYLENOL) suppository 650 mg  650 mg Rectal Q6H PRN Betsey Zamudio, APRN - CNP        0.9 % sodium chloride infusion   IntraVENous Continuous Betsey Zamudio APRN -  mL/hr at 12/28/21 0742 New Bag at 12/28/21 0742    albuterol (PROVENTIL) nebulizer solution 2.5 mg  2.5 mg Nebulization Q4H PRN Betsey Pérez ENMANUEL Olmstead - CNP           Allergies:  Patient has no known allergies. Social History:    TOBACCO:   reports that she has been smoking cigarettes. She started smoking about 41 years ago. She has a 40.00 pack-year smoking history. She has never used smokeless tobacco.  ETOH:   reports previous alcohol use. Family History:        Problem Relation Age of Onset    Heart Disease Mother     Emphysema Mother     Esophageal Cancer Father     Cancer Father          Review of Systems -   General ROS: negative  Psychological ROS: negative  Hematological and Lymphatic ROS: No history of blood clots or bleeding disorder. Respiratory ROS: no cough, shortness of breath, or wheezing  Cardiovascular ROS: As per HPI. EKG on 12/27 demonstrated sinus bradycardia with decreased T wave amplitude and ventricular rate of 48 bpm.  Gastrointestinal ROS: negative  Genito-Urinary ROS: no dysuria, trouble voiding, or hematuria  Musculoskeletal ROS: negative  Neurological ROS: no TIA or stroke symptoms  Dermatological ROS: negative    All others reviewed and are negative.        BP 95/61   Pulse 60   Temp 98.2 °F (36.8 °C) (Oral)   Resp 17   SpO2 100%       Physical Examination:   General appearance - not in acute distress  Mental status - oriented to place, drowsy and therefore not responsive to questions  Neck - supple, no significant adenopathy, no JVD, or carotid bruits  Chest - clear to auscultation, no wheezes, rales or rhonchi, symmetric air entry  Heart - normal rate, regular rhythm, normal S1, S2, no murmurs, rubs, clicks or gallops  Abdomen - soft, nontender, nondistended, no masses or organomegaly  Neurological - alert, oriented, normal speech, no focal findings or movement disorder noted  Musculoskeletal - no joint tenderness, deformity or swelling  Extremities - peripheral pulses normal, no pedal edema, no clubbing or cyanosis  Skin - normal coloration and turgor, no rashes, no suspicious skin lesions noted      LABS:    Recent Labs     12/27/21  1715 12/28/21  0257   TROPONINT < 0.010 < 0.010     CBC:   Lab Results   Component Value Date    WBC 13.7 12/28/2021    RBC 4.03 12/28/2021    HGB 13.2 12/28/2021    HCT 40.9 12/28/2021    .5 12/28/2021    MCH 32.8 12/28/2021    MCHC 32.3 12/28/2021     12/28/2021    MPV 8.9 12/28/2021     BMP:    Lab Results   Component Value Date     12/28/2021    K 4.2 12/28/2021    K 4.4 12/27/2021     12/28/2021    CO2 22 12/28/2021    BUN 41 12/28/2021    LABALBU 3.6 12/28/2021    CREATININE 1.4 12/28/2021    CALCIUM 8.8 12/28/2021    LABGLOM 39 12/28/2021    GLUCOSE 107 12/28/2021     Hepatic Function Panel:    Lab Results   Component Value Date    ALKPHOS 78 12/28/2021    ALT 13 12/28/2021    AST 22 12/28/2021    PROT 5.7 12/28/2021    BILITOT 0.3 12/28/2021    LABALBU 3.6 12/28/2021     Magnesium:  No results found for: MG  Warfarin PT/INR:  No components found for: PTPATWAR, PTINRWAR  HgBA1c:  No results found for: LABA1C  FLP:  No results found for: TRIG, HDL, LDLCALC, LDLDIRECT, LABVLDL  TSH:    Lab Results   Component Value Date    TSH 0.822 12/27/2021     BNP: No components found for: PRO-BNP      Assessment/Plan:    Patient Active Problem List   Diagnosis    Symptomatic bradycardia    Toxic effect of carbon monoxide, unintentional    Altered mental status   Briefly this is a 52-year-old female who presented on 12/27/2021 for altered mentation. She was diagnosed with carbon monoxide poisoning. She has been placed on pressor support for hypotension and underwent transvenous pacemaker for significant symptomatic bradycardia. Cardiology was consulted for evaluation of symptomatic bradycardia.       Telemetry  Continue transvenous pacing  Continue to treat carbon monoxide poisoning  Monitor and correct electrolytes  Unsure of her baseline rhythm  We will continue transvenous pacemaker until her mental status improves  Discussed about permanent pacemaker if her condition does not improve with daughter who is an RN at [facility] patient  2D echocardiogram  Patient will likely improve her heart rate once her clinical current condition improves  We will continue to follow          Please do note hesitate to contact me for any further questions. Thank you for the opportunity to be involved in this patient's care.     Code Status: Full Code

## 2021-12-29 PROBLEM — E43 SEVERE MALNUTRITION (HCC): Chronic | Status: ACTIVE | Noted: 2021-12-29

## 2021-12-29 LAB
ANION GAP SERPL CALCULATED.3IONS-SCNC: 6 MEQ/L (ref 8–16)
BASOPHILS # BLD: 0.3 %
BASOPHILS ABSOLUTE: 0 THOU/MM3 (ref 0–0.1)
BUN BLDV-MCNC: 27 MG/DL (ref 7–22)
CALCIUM SERPL-MCNC: 8.5 MG/DL (ref 8.5–10.5)
CARBON MONOXIDE, BLOOD: 4.1 % CO SAT
CHLORIDE BLD-SCNC: 112 MEQ/L (ref 98–111)
CO2: 23 MEQ/L (ref 23–33)
CREAT SERPL-MCNC: 0.9 MG/DL (ref 0.4–1.2)
EOSINOPHIL # BLD: 0.9 %
EOSINOPHILS ABSOLUTE: 0.1 THOU/MM3 (ref 0–0.4)
ERYTHROCYTE [DISTWIDTH] IN BLOOD BY AUTOMATED COUNT: 13.7 % (ref 11.5–14.5)
ERYTHROCYTE [DISTWIDTH] IN BLOOD BY AUTOMATED COUNT: 52.3 FL (ref 35–45)
GFR SERPL CREATININE-BSD FRML MDRD: 64 ML/MIN/1.73M2
GLUCOSE BLD-MCNC: 112 MG/DL (ref 70–108)
HCT VFR BLD CALC: 38.9 % (ref 37–47)
HEMOGLOBIN: 12.4 GM/DL (ref 12–16)
IMMATURE GRANS (ABS): 0.02 THOU/MM3 (ref 0–0.07)
IMMATURE GRANULOCYTES: 0.2 %
LYMPHOCYTES # BLD: 23.4 %
LYMPHOCYTES ABSOLUTE: 2.3 THOU/MM3 (ref 1–4.8)
MCH RBC QN AUTO: 32.8 PG (ref 26–33)
MCHC RBC AUTO-ENTMCNC: 31.9 GM/DL (ref 32.2–35.5)
MCV RBC AUTO: 102.9 FL (ref 81–99)
MONOCYTES # BLD: 10.2 %
MONOCYTES ABSOLUTE: 1 THOU/MM3 (ref 0.4–1.3)
NUCLEATED RED BLOOD CELLS: 0 /100 WBC
PLATELET # BLD: 242 THOU/MM3 (ref 130–400)
PMV BLD AUTO: 9.1 FL (ref 9.4–12.4)
POTASSIUM SERPL-SCNC: 4.2 MEQ/L (ref 3.5–5.2)
RBC # BLD: 3.78 MILL/MM3 (ref 4.2–5.4)
SEG NEUTROPHILS: 65 %
SEGMENTED NEUTROPHILS ABSOLUTE COUNT: 6.3 THOU/MM3 (ref 1.8–7.7)
SODIUM BLD-SCNC: 141 MEQ/L (ref 135–145)
WBC # BLD: 9.7 THOU/MM3 (ref 4.8–10.8)

## 2021-12-29 PROCEDURE — 6370000000 HC RX 637 (ALT 250 FOR IP): Performed by: NURSE PRACTITIONER

## 2021-12-29 PROCEDURE — 80048 BASIC METABOLIC PNL TOTAL CA: CPT

## 2021-12-29 PROCEDURE — 99232 SBSQ HOSP IP/OBS MODERATE 35: CPT | Performed by: NURSE PRACTITIONER

## 2021-12-29 PROCEDURE — 2580000003 HC RX 258: Performed by: NURSE PRACTITIONER

## 2021-12-29 PROCEDURE — 85025 COMPLETE CBC W/AUTO DIFF WBC: CPT

## 2021-12-29 PROCEDURE — 2000000000 HC ICU R&B

## 2021-12-29 PROCEDURE — 99233 SBSQ HOSP IP/OBS HIGH 50: CPT | Performed by: INTERNAL MEDICINE

## 2021-12-29 PROCEDURE — 6360000002 HC RX W HCPCS: Performed by: NURSE PRACTITIONER

## 2021-12-29 PROCEDURE — 82375 ASSAY CARBOXYHB QUANT: CPT

## 2021-12-29 RX ADMIN — SODIUM CHLORIDE: 9 INJECTION, SOLUTION INTRAVENOUS at 07:37

## 2021-12-29 RX ADMIN — ENOXAPARIN SODIUM 30 MG: 100 INJECTION SUBCUTANEOUS at 08:45

## 2021-12-29 RX ADMIN — SODIUM CHLORIDE, PRESERVATIVE FREE 10 ML: 5 INJECTION INTRAVENOUS at 22:17

## 2021-12-29 RX ADMIN — SODIUM CHLORIDE, PRESERVATIVE FREE 10 ML: 5 INJECTION INTRAVENOUS at 08:45

## 2021-12-29 RX ADMIN — BUPRENORPHINE AND NALOXONE 1 FILM: 8; 2 FILM BUCCAL; SUBLINGUAL at 22:16

## 2021-12-29 RX ADMIN — BUPRENORPHINE AND NALOXONE 1 FILM: 8; 2 FILM BUCCAL; SUBLINGUAL at 08:48

## 2021-12-29 RX ADMIN — SODIUM CHLORIDE: 9 INJECTION, SOLUTION INTRAVENOUS at 16:54

## 2021-12-29 RX ADMIN — ONDANSETRON 4 MG: 4 TABLET, ORALLY DISINTEGRATING ORAL at 20:24

## 2021-12-29 ASSESSMENT — PAIN SCALES - GENERAL: PAINLEVEL_OUTOF10: 0

## 2021-12-29 ASSESSMENT — ENCOUNTER SYMPTOMS
SHORTNESS OF BREATH: 1
CONSTIPATION: 0
NAUSEA: 0
SINUS PRESSURE: 0
BACK PAIN: 1
VOMITING: 0
PHOTOPHOBIA: 0
ABDOMINAL PAIN: 0
WHEEZING: 0
RHINORRHEA: 0
COUGH: 0
DIARRHEA: 0
TROUBLE SWALLOWING: 0
BLOOD IN STOOL: 0
SINUS PAIN: 0
SORE THROAT: 0
ABDOMINAL DISTENTION: 0

## 2021-12-29 NOTE — PROGRESS NOTES
Cardiology Progress Note      Patient:  Serafin Carmen  YOB: 1965  MRN: [id number]   Acct: [de-identified]  Admit Date:  12/27/2021  Primary Cardiologist: none  Seen by Dr. Claire Tolentino    Per prior cardiology consult note-  Reason for Consultation:  Symptomatic bradycardia        History Of Present Illness:    60 y.o. pleasant female who presents to the Emergency Department on 12/27/2021 for the evaluation of altered mental status.  History obtained primarily from family at bedside,  and children. Ralf Devi is able to state that she is not having any current pain.  She acknowledges some difficulty with her memory.   Family reports that the patient has been in the process of weaning off of her Celexa but did not understand the instructions and weaned off over the course of 2 weeks instead of 4.  She did take a half dose yesterday and today hoping it would help her symptoms.  Family denies any concern for intentional overuse/abuse.  States she smokes 2 packs/day and uses marijuana.  Reports rare alcohol use but she did have 2 drinks on [place] and feel her symptoms escalated after that. Radha Hernandez has chronic pain which is generalized but she is complained of worsening back pain since a fall last week for which she was seen in another ED. Miguel Dong states she has been sleeping 12 to 15 hours/day and appears to hallucinate.  She will be dazed and have conversations with herself. Radha Hernandez has occasional jerking behaviors without any visualized seizure activity.  States she was prescribed muscle relaxers after her previous fall but did not take these.  She has complained of chills with mild cough without change from her baseline.  Very poor oral intake without vomiting or diarrhea.  Family states she is currently in the process of getting an outpatient work-up for possible lung cancer.  She was seen in the outpatient office today where she was noted to have oxygen saturation in the mid 80s on room air and sent to the ED for further evaluation.     12/28/2021: Overnight, patient continued to have hypotension and was initially given 1000mL 0.9% NS bolus, dopamine infusion and 0.5mg atropine push. Dopamine switched to levophed. Temporary pacemaker was placed at bedside. Today, she completed her echo and are waiting on results. A thorough evaluation was not completed at bedside today as she was drowsy and wanted to sleep. Attempts at pacemaker removal were made earlier this AM but patient's heart rate dropped into 40s at those times. Current BP is 95/65. Carboxyhemoglobin level at 5.5 this AM. WBC increased from 12 to 13.7. Creatinine improved to 1.4.       Subjective (Events in last 24 hours):     Pt awake and able to answer questions appropriately     No CP   Echo reviewed     Tele SR HR 70  Not using TVP   On minimal dose nor-epi      Objective:   BP 86/65   Pulse 60   Temp 97.7 °F (36.5 °C) (Oral)   Resp 15   SpO2 94%        TELEMETRY: SR HR 70    Physical Exam:  General Appearance: alert and oriented to person, place and time, in no acute distress  Cardiovascular: normal rate, regular rhythm, normal S1 and S2, no murmurs, rubs, clicks, or gallops, distal pulses intact,   Pulmonary/Chest: clear to auscultation bilaterally- no wheezes, rales or rhonchi, normal air movement, no respiratory distress  Abdomen: soft, non-tender, non-distended, normal bowel sounds, no masses Extremities: no cyanosis, clubbing or edema, pulses present    Musculoskeletal: normal range of motion, no joint swelling, deformity or tenderness  Neurological: alert, oriented, normal speech, no focal findings or movement disorder noted    Medications:    buprenorphine-naloxone  1 Film SubLINGual BID    sodium chloride flush  5-40 mL IntraVENous 2 times per day    enoxaparin  30 mg SubCUTAneous Daily      norepinephrine 1 mcg/min (12/29/21 1050)    sodium chloride      sodium chloride 125 mL/hr at 12/29/21 0737     sodium chloride flush, 5-40 mL, PRN  sodium chloride, 25 mL, PRN  ondansetron, 4 mg, Q8H PRN   Or  ondansetron, 4 mg, Q6H PRN  polyethylene glycol, 17 g, Daily PRN  acetaminophen, 650 mg, Q6H PRN   Or  acetaminophen, 650 mg, Q6H PRN  albuterol, 2.5 mg, Q4H PRN        Diagnostics:    Echo:    Electronically signed by Sharron Jeffers MD (Interpreting   physician) on 12/28/2021 at 06:22 PM   ----------------------------------------------------------------      Findings      Mitral Valve   Mild mitral regurgitation is present. Aortic Valve   Aortic valve appears tricuspid. Aortic valve leaflets are somewhat thickened. Tricuspid Valve   Trivial tricuspid regurgitation visualized. Pulmonic Valve   The pulmonic valve was not well visualized . Trivial pulmonic regurgitation visualized. Left Atrium   Left atrial size was normal.      Left Ventricle   Ejection fraction is visually estimated at 50%. There was mild global hypokinesis of the left ventricle. Right Atrium   Right atrial size was normal.      Right Ventricle   The right ventricular size was normal with normal systolic function and   wall thickness. Pericardial Effusion   The pericardium was normal in appearance with no evidence of a pericardial   effusion. Pleural Effusion   No evidence of pleural effusion. Aorta / Great Vessels   -Aortic root dimension within normal limits.   -The Pulmonary artery is within normal limits. -IVC size is within normal limits with normal respiratory phasic changes. Lab Data:    Cardiac Enzymes:  No results for input(s): CKTOTAL, CKMB, CKMBINDEX, TROPONINI in the last 72 hours.     CBC:   Lab Results   Component Value Date    WBC 9.7 12/29/2021    RBC 3.78 12/29/2021    HGB 12.4 12/29/2021    HCT 38.9 12/29/2021     12/29/2021       CMP:    Lab Results   Component Value Date     12/29/2021    K 4.2 12/29/2021    K 4.4 12/27/2021     12/29/2021    CO2 23 12/29/2021    BUN 27 12/29/2021    CREATININE 0.9 12/29/2021    LABGLOM 64 12/29/2021    GLUCOSE 112 12/29/2021    CALCIUM 8.5 12/29/2021       Hepatic Function Panel:    Lab Results   Component Value Date    ALKPHOS 78 12/28/2021    ALT 13 12/28/2021    AST 22 12/28/2021    PROT 5.7 12/28/2021    BILITOT 0.3 12/28/2021    LABALBU 3.6 12/28/2021       Magnesium:  No results found for: MG    PT/INR:  No results found for: PROTIME, INR    HgBA1c:  No results found for: LABA1C    FLP:  No results found for: TRIG, HDL, LDLCALC, LDLDIRECT, LABVLDL    TSH:    Lab Results   Component Value Date    TSH 0.822 12/27/2021         Assessment:    AMS -- resolved     Carbon monoxide poisoning - current smoker     Symptomatic bradycardia - resolved HR 50-70  Hypotension - improving   Dehydration - improving       Plan:  · TVP turned to 40 - pt hr SR 50-70  · If not needing TVP may pull this walt around 8pm   · No needed cardiac testing at present   · Call cardiology for any concerns - we will see prn          Electronically signed by ENMANUEL Desouza CNP on 12/29/2021 at 1:47 PM

## 2021-12-29 NOTE — PROGRESS NOTES
Family in to visit- updated on pt condition and POC.  1245- Pacer paused and pt HR-53- denied any complaints. 1350- Pacer rate lowered to 40 per [facility] CPN. 1430- Resting at present- no prob noted.

## 2021-12-29 NOTE — CARE COORDINATION
12/29/21, 5:52 PM EST    DISCHARGE ON GOING EVALUATION    Spencer Dow day: 1  Location: -12/012-A Reason for admit: Anorexia [R63.0]  Bradycardia [R00.1]  SHARONA (acute kidney injury) ([facility] [address] [N17.9]  Symptomatic bradycardia [R00.1]  Toxic effect of carbon monoxide, unintentional, initial encounter [T58.91XA]  Hypotension, unspecified hypotension type [I95.9]  Altered mental status, unspecified altered mental status type [R41.82]   Procedure:   12/27 CXR: Slightly increased interstitial markings both upper lung fields, consistent with mild interstitial pneumonitis/fibrosis  12/27 CT Head: No acute findings  12/28 TVP placed  12/28 CVC right subclavian    Barriers to Discharge: TVP rate decreased to 40 today. Mentation returning to normal. Levophed weaned off. Afebrile. SB 40's-50's. Sats 94% on 3L O2. Ox3, not situation. Follows commands. Telemetry, TVP, CVC, n/v checks, SCDs. IVF, suboxone, lovenox. PCP: ENMANUEL Nichole CNP  Readmission Risk Score: 12.9 ( )%  Patient Goals/Plan/Treatment Preferences: Home w/. Monitor for needs and  possible home O2. Will need PT/OT when TVP out.

## 2021-12-29 NOTE — PROGRESS NOTES
Comprehensive Nutrition Assessment    Type and Reason for Visit:  Initial,Consult (ONS recommendations)    Nutrition Recommendations/Plan:   RD advanced diet from FL to Regular after clearing with RN - pt. With multiple regular textured foods at bedside  Enlive ONS TID  Appetite stimulant may be of benefit  RN plans to zero bed and weigh pt. Nutrition Assessment:    Pt. severely malnourished AEB criteria listed below. At risk for further nutritional compromise r/t admit with anorexia, bradycardia, AMS, poor appetite ~5 weeks per family, weight loss, need for temporary pacemaker and underlying medical condition COPD, HTN, outpt. PRITCHETT for possible lung CA per notes. Nutrition recommendations/interventions as per above. Malnutrition Assessment:  Malnutrition Status:  Severe malnutrition    Context:  Chronic Illness     Findings of the 6 clinical characteristics of malnutrition:  Energy Intake:  7 - 75% or less estimated energy requirements for 1 month or longer (per family report to MD on admit)  Weight Loss:  Unable to assess (only stated weight available - RN to obtain once able to get bed zeroed)     Body Fat Loss:  7 - Severe body fat loss Orbital,Triceps,Fat Overlying Ribs (triceps and torso assessment per RN - pt. sleeping and wrapped in blanket during RD visit 12/29)   Muscle Mass Loss:  7 - Severe muscle mass loss Temples (temporalis),Clavicles (pectoralis & deltoids)  Fluid Accumulation:  1 - Mild     Strength:  Not Performed    Estimated Daily Nutrient Needs:  Energy (kcal):  8425-7923 (30-35/kgm); Weight Used for Energy Requirements:  Current (48kgm)     Protein (g):  72gms (1.5/kgm); Weight Used for Protein Requirements:  Current (48kgm)        Fluid (ml/day):  per physician;        Nutrition Related Findings:     Spoke with RN this am - concerned with inability to easily give the lovenex subq d/t pt.  Being so thin - discussed physical malnutrition signs with RN which she verified as RD unable to do NFPE below the clavicles as pt. Wrapped in blanket and sleeping during rounds; family stated to MD poor appetite for ~5 weeks and sleeping ~12-15h a day with confusion and hallucinations; ? Carbon monoxide r/t COPD/substance use? - was weaning celexa too quickly per RN; meds include levophed, suboxone; glucose 112  Na 141  K+ 4.2  ; only stated weight; temp pacemaker at bedside    Wounds:  None       Current Nutrition Therapies:    ADULT DIET; Regular  ADULT ORAL NUTRITION SUPPLEMENT; Breakfast, Lunch, Dinner; Standard High Calorie/High Protein Oral Supplement    Anthropometric Measures:  · Height: 5' 5.5\" (166.4 cm)  · Current Body Weight: 105 lb (47.6 kg)   · Admission Body Weight: 105 lb (47.6 kg) (12/23 stated)    · Usual Body Weight: 107 lb (48.5 kg) (10/6/21)     · Ideal Body Weight: 128 lbs;   · % Ideal Body Weight 82 %   · BMI: 17.2  · BMI Categories: Underweight (BMI less than 18.5)       Nutrition Diagnosis:   · Severe malnutrition,In context of chronic illness related to inadequate protein-energy intake as evidenced by severe loss of subcutaneous fat,severe muscle loss      Nutrition Interventions:   Food and/or Nutrient Delivery:  Modify Current Diet,Start Oral Nutrition Supplement  Nutrition Education/Counseling:  Education not appropriate (12/29 pt. asleep on RD visit)   Coordination of Nutrition Care:  Continue to monitor while inpatient    Goals:  consume greater than 75% meals during LOS       Nutrition Monitoring and Evaluation:   Behavioral-Environmental Outcomes:  None Identified   Food/Nutrient Intake Outcomes:  Diet Advancement/Tolerance,Food and Nutrient Intake,Supplement Intake  Physical Signs/Symptoms Outcomes:  Biochemical Data,Chewing or Swallowing,GI Status,Fluid Status or Edema,Hemodynamic Status,Meal Time Behavior,Nutrition Focused Physical Findings,Skin,Weight     Discharge Planning:     Too soon to determine     Electronically signed by Mya Ramos, RD, LD on 12/29/21 at 3:42 PM EST    Contact: [phone]

## 2021-12-29 NOTE — PROGRESS NOTES
CRITICAL CARE PROGRESS NOTE      Patient:  Serafin Carmen    Unit/Bed:4D-12/012-A  YOB: 1965  MRN: [id number]   PCP: ENMANUEL Mcwilliams CNP  Date of Admission: 12/27/2021  Chief Complaint:-AMS/fatigue    Assessment and Plan:    1. Symptomatic Bradycardia: Likely secondary to carbon monoxide poisoning. Patient was given atropine with no significant improvement. Subsequently placed on dopamine GTT with no significant improvement. Transvenous pacer was placed 12/28. Echo demonstrates EF 50% with mild global hypokinesis of the left ventricle. Will decrease TVP to rate of 40 and monitor patient. 2. Acute Carbon Monoxide Poisoning: Likely secondary to heavy smoking. CO 13.9 on arrival.  Trended down to 4.1 today. Mentation returning to normal.  Continue with nasal cannula. 3. Primary Hypertension:  Currently hypotensive. Will hold BP outpatient regimen. 4. Severe Opiate Disorder:  Suboxone resumed. 5. Hx of COPD: No inhalers listed on OP regimen. 6. Current Smoker:   regarding smoking cessation  7. Acute Toxic Encephalopathy: Resolved. Likely secondary to CO poisoning. CT head with no acute intracranial process. INITIAL H AND P AND ICU COURSE:  Beka Parks is a 60year old causasian female admitted to [facility] ICU 12/28/2021 s/p TVP placement for symptomatic bradycardia.      Patient has a significant history of current every day smoker, Severe COPD 2011 FEV1=46%, Essential HPTN, Severe Opioid disorder, Mood disorder.      Ade Matamoros presented to [facility] ER 12/27/2021 for the evaluation of altered mental status.  History obtained primarily from family at bedside,  and children. Ralf Devi is able to state that she is not having any current pain.  She acknowledges some difficulty with her memory.   Family reports that the patient has been in the process of weaning off of her Celexa but did not understand the instructions and weaned off over the course of 2 weeks instead of 4.  She did take a half dose yesterday and today hoping it would help her symptoms.  Family denies any concern for intentional overuse/abuse.  States she smokes 2 packs/day and uses marijuana.  Reports rare alcohol use but she did have 2 drinks on [place] and feel her symptoms escalated after that. Whitley Aleman has chronic pain which is generalized but she is complained of worsening back pain since a fall last week for which she was seen in another ED. [facility] states she has been sleeping 12 to 15 hours/day and appears to hallucinate.  She will be dazed and have conversations with herself. Whitley Aleman has occasional jerking behaviors without any visualized seizure activity.  States she was prescribed muscle relaxers after her previous fall but did not take these.  She has complained of chills with mild cough without change from her baseline.  Very poor oral intake without vomiting or diarrhea.  Family states she is currently in the process of getting an outpatient work-up for possible lung cancer.  She was seen in the outpatient office today where she was noted to have oxygen saturation in the mid 80s on room air and sent to the ED for further evaluation.     Family identify home monitoring of SaO2 for the past 4-5 days and \"high 80's\".     2021: While in the ER patient was found to be bradycardic, lethargic and hypotensive. Atropine was given and Dopamine gtt was started. Narcan 0.4 X1 given. An elevated carbonmonoxide level was detected and patient was placed on 100% NRB Mask. Bradycardia continued despite Dopamine gtt at 7 m/k/m. TVP was placed and patient was admitted to the ICU for further management and care. 2021: CO level trended down to 4.1. Levophed weaned off. D/w cardiology - will decrease TVP rate to 40 to monitor response. Past Medical History:  See HPI. Family History: Mother -CAD, emphysema, father-, esophageal cancer.   Social History: Current everyday smoker, denies any alcohol, or illicit drug use. Patient is . ROS   Negative for all. Scheduled Meds:   buprenorphine-naloxone  1 Film SubLINGual BID    sodium chloride flush  5-40 mL IntraVENous 2 times per day    enoxaparin  30 mg SubCUTAneous Daily     Continuous Infusions:   norepinephrine Stopped (12/29/21 1442)    sodium chloride      sodium chloride 125 mL/hr at 12/29/21 0737       PHYSICAL EXAMINATION:  T:  97.6. P:  55. RR:  16. B/P:  99/57. O2 Sat:  94.  I/O:  Net +2.8L since admission  Body mass index is 17.21 kg/m². General:   Acutely ill-appearing  female   HEENT:  normocephalic and atraumatic. No scleral icterus. PERR  Neck: supple. No Thyromegaly. Lungs: clear to auscultation. No retractions  Cardiac: RRR. No JVD. Abdomen: soft. Nontender. Extremities:  No clubbing, cyanosis, or edema x 4. Vasculature: capillary refill < 3 seconds. Palpable dorsalis pedis pulses. Skin:  warm and dry. Psych:  Alert and oriented x3. Affect appropriate  Lymph:  No supraclavicular adenopathy. Neurologic:  No focal deficit. No seizures. Data: (All radiographs, tracings, PFTs, and imaging are personally viewed and interpreted unless otherwise noted).  Sodium 141, potassium 4.2, chloride 112, bicarbonate 23, BUN 27, Cr 0.9, AG 6.0, glucose 112, calcium 8.5   WBC 9.7, Hgb 12.4, HCT 38.9, .9, Platelet 006    Telemetry shows sinus bradycardia        Seen with multidisciplinary ICU team.  Meets Continued ICU Level Care Criteria:    [x] Yes   [] No - Transfer Planned to listed location:  [] HOSPITALIST CONTACTED-      Case and plan discussed with Dr. Emma Sterling.         Electronically signed by MD Blake Diop [facility]

## 2021-12-30 LAB
ANION GAP SERPL CALCULATED.3IONS-SCNC: 7 MEQ/L (ref 8–16)
BUN BLDV-MCNC: 23 MG/DL (ref 7–22)
CALCIUM SERPL-MCNC: 8.3 MG/DL (ref 8.5–10.5)
CHLORIDE BLD-SCNC: 113 MEQ/L (ref 98–111)
CO2: 22 MEQ/L (ref 23–33)
CREAT SERPL-MCNC: 0.8 MG/DL (ref 0.4–1.2)
ERYTHROCYTE [DISTWIDTH] IN BLOOD BY AUTOMATED COUNT: 13.6 % (ref 11.5–14.5)
ERYTHROCYTE [DISTWIDTH] IN BLOOD BY AUTOMATED COUNT: 52.7 FL (ref 35–45)
GFR SERPL CREATININE-BSD FRML MDRD: 73 ML/MIN/1.73M2
GLUCOSE BLD-MCNC: 88 MG/DL (ref 70–108)
HCT VFR BLD CALC: 39.5 % (ref 37–47)
HEMOGLOBIN: 12.4 GM/DL (ref 12–16)
MCH RBC QN AUTO: 32.7 PG (ref 26–33)
MCHC RBC AUTO-ENTMCNC: 31.4 GM/DL (ref 32.2–35.5)
MCV RBC AUTO: 104.2 FL (ref 81–99)
MRSA SCREEN: NORMAL
PLATELET # BLD: 226 THOU/MM3 (ref 130–400)
PMV BLD AUTO: 9 FL (ref 9.4–12.4)
POTASSIUM REFLEX MAGNESIUM: 4.1 MEQ/L (ref 3.5–5.2)
RBC # BLD: 3.79 MILL/MM3 (ref 4.2–5.4)
SODIUM BLD-SCNC: 142 MEQ/L (ref 135–145)
WBC # BLD: 8.6 THOU/MM3 (ref 4.8–10.8)

## 2021-12-30 PROCEDURE — 85027 COMPLETE CBC AUTOMATED: CPT

## 2021-12-30 PROCEDURE — 6370000000 HC RX 637 (ALT 250 FOR IP): Performed by: NURSE PRACTITIONER

## 2021-12-30 PROCEDURE — 99232 SBSQ HOSP IP/OBS MODERATE 35: CPT | Performed by: NURSE PRACTITIONER

## 2021-12-30 PROCEDURE — 6360000002 HC RX W HCPCS: Performed by: NURSE PRACTITIONER

## 2021-12-30 PROCEDURE — 99233 SBSQ HOSP IP/OBS HIGH 50: CPT | Performed by: INTERNAL MEDICINE

## 2021-12-30 PROCEDURE — 2580000003 HC RX 258: Performed by: NURSE PRACTITIONER

## 2021-12-30 PROCEDURE — 36592 COLLECT BLOOD FROM PICC: CPT

## 2021-12-30 PROCEDURE — 36415 COLL VENOUS BLD VENIPUNCTURE: CPT

## 2021-12-30 PROCEDURE — 80048 BASIC METABOLIC PNL TOTAL CA: CPT

## 2021-12-30 PROCEDURE — 2000000000 HC ICU R&B

## 2021-12-30 RX ADMIN — BUPRENORPHINE AND NALOXONE 1 FILM: 8; 2 FILM BUCCAL; SUBLINGUAL at 09:29

## 2021-12-30 RX ADMIN — BUPRENORPHINE AND NALOXONE 1 FILM: 8; 2 FILM BUCCAL; SUBLINGUAL at 20:43

## 2021-12-30 RX ADMIN — SODIUM CHLORIDE: 9 INJECTION, SOLUTION INTRAVENOUS at 09:15

## 2021-12-30 RX ADMIN — ENOXAPARIN SODIUM 30 MG: 100 INJECTION SUBCUTANEOUS at 09:29

## 2021-12-30 RX ADMIN — SODIUM CHLORIDE, PRESERVATIVE FREE 10 ML: 5 INJECTION INTRAVENOUS at 20:43

## 2021-12-30 RX ADMIN — SODIUM CHLORIDE 25 ML: 9 INJECTION, SOLUTION INTRAVENOUS at 18:24

## 2021-12-30 RX ADMIN — SODIUM CHLORIDE: 9 INJECTION, SOLUTION INTRAVENOUS at 00:50

## 2021-12-30 ASSESSMENT — PAIN SCALES - GENERAL
PAINLEVEL_OUTOF10: 0

## 2021-12-30 NOTE — H&P
discussed. The patient had no further questions and wished to proceed; the consent form was signed. MEDICAL HISTORY  []ASHD/ANGINA/MI/CHF   []Hypertension  []Diabetes  []Hyperlipidemia  []Smoking  []Family Hx of ASHD  []Additional information:       has a past medical history of COPD (chronic obstructive pulmonary disease) (HCC) and High blood pressure. SURGICAL HISTORY   has a past surgical history that includes Tubal ligation (4-2002); hernia repair (4-2002); laparoscopy (1994?);  Wrist surgery; and cyst removal.  Additional information:       ALLERGIES   Allergies as of 12/27/2021    (No Known Allergies)     Additional information:       MEDICATIONS   Aspirin  [] 81 mg  [] 325 mg  [] None  Antiplatelet drug therapy use last 5 days  []No []Yes  Coumadin Use Last 5 Days []No []Yes  Other anticoagulant use last 5 days  []No []Yes    Current Facility-Administered Medications:     buprenorphine-naloxone (SUBOXONE) 8-2 MG SL film 1 Film, 1 Film, SubLINGual, BID, Betsey May Plaster, APRN - CNP, 1 Film at 12/30/21 1507    sodium chloride flush 0.9 % injection 5-40 mL, 5-40 mL, IntraVENous, 2 times per day, Betsey May Plaster, APRN - CNP, 10 mL at 12/29/21 2217    sodium chloride flush 0.9 % injection 5-40 mL, 5-40 mL, IntraVENous, PRN, Betsey May Plaster, APRN - CNP    0.9 % sodium chloride infusion, 25 mL, IntraVENous, PRN, Betsey May Plaster, APRN - CNP    enoxaparin (LOVENOX) injection 30 mg, 30 mg, SubCUTAneous, Daily, Betsey May Plaster, APRN - CNP, 30 mg at 12/30/21 0929    ondansetron (ZOFRAN-ODT) disintegrating tablet 4 mg, 4 mg, Oral, Q8H PRN, 4 mg at 12/29/21 2024 **OR** ondansetron (ZOFRAN) injection 4 mg, 4 mg, IntraVENous, Q6H PRN, Betsey May Plaster, APRN - CNP    polyethylene glycol (GLYCOLAX) packet 17 g, 17 g, Oral, Daily PRN, Betsey May Plaster, APRN - CNP    acetaminophen (TYLENOL) tablet 650 mg, 650 mg, Oral, Q6H PRN **OR** acetaminophen (TYLENOL) suppository 650 mg, 650 mg, Rectal, Q6H PRN, ENMANUEL Tovar CNP    albuterol (PROVENTIL) nebulizer solution 2.5 mg, 2.5 mg, Nebulization, Q4H PRN, ENMANUEL Tovar CNP  Prior to Admission medications    Medication Sig Start Date End Date Taking? Authorizing Provider   cyclobenzaprine (FLEXERIL) 10 MG tablet Take 1 tablet by mouth 2 times daily as needed for Muscle spasms 12/23/21 1/2/22  Will Elmore MD   FLUoxetine (PROZAC) 10 MG capsule Take 1 capsule by mouth daily 12/14/21   ENMANUEL Mcwilliams CNP   ondansetron (ZOFRAN) 4 MG tablet TAKE 1 TABLET BY MOUTH THREE TIMES DAILY AS NEEDED FOR NAUSEA OR  VOMITING 12/9/21   ENMANUEL Mcwilliams CNP   citalopram (CELEXA) 40 MG tablet Take 1 tablet by mouth once daily 12/8/21   ENMANUEL Mcwilliams CNP   gabapentin (NEURONTIN) 300 MG capsule Take 300 mg by mouth 3 times daily. Historical Provider, MD   naloxone 4 MG/0.1ML LIQD nasal spray 1 spray by Nasal route as needed for Opioid Reversal 10/6/21   ENMANUEL Mcwilliams CNP   triamterene-hydrochlorothiazide (MAXZIDE) 75-50 MG per tablet Take 1 tablet by mouth daily. Historical Provider, MD     Additional information:       VITAL SIGNS   Vitals:    12/30/21 1530   BP: 108/64   Pulse: 58   Resp: 13   Temp:    SpO2: 93%       PHYSICAL:   General: No acute distress  HEENT:  Unremarkable for age  Neck: without increased JVD, carotid pulses 2+ bilaterally without bruits  Heart: RRR, S1 & S2 WNL.  No murmurs   Lungs: Clear to auscultation    Abdomen: BS present, without HSM, masses, or tenderness    Extremities: without C,C,E.  Pulses 2+ bilaterally   Mental Status: Alert & Oriented        PLANNED PROCEDURE   [x]Cath  [x]PCI                []Pacemaker/AICD  []CARMELLA             []Cardioversion []Peripheral angiography/PTA  []Other:      SEDATION  Planned agent: [x]Midazolam []Meperidine []Sublimaze []Morphine  []Diazepam  []Other:     ASA Classification:  []1 []2 [x]3 []4 []5   Class 1: A normal healthy patient  Class 2: Pt with mild to moderate systemic disease  Class 3: Severe systemic disease or disturbance  Class 4: Severe systemic disorders that are already life threatening. Class 5: Moribund pt with little chances of survival, for more than 24 hours. Mallampati I Airway Classification:   []1 [x]2 []3 []4     [x]Pre-procedure diagnostic studies complete and results available. Comment:    [x]Previous sedation/anesthesia experiences assessed. Comment:    [x]The patient is an appropriate candidate to undergo the planned procedure sedation and anesthesia. (Refer to nursing sedation/analgesia documentation record)  [x]Formulation and discussion of sedation/procedure plan, risks, and expectations with patient and/or responsible adult completed. [x]Patient examined immediately prior to the procedure.  (Refer to nursing sedation/analgesia documentation record)      Nuno Macias MD, [facility]    Electronically signed 12/30/2021 at 4:06 PM

## 2021-12-30 NOTE — PROGRESS NOTES
CRITICAL CARE PROGRESS NOTE      Patient:  Jennifer [facility]    Unit/Bed:4D-12/012-A  YOB: 1965  MRN: [id number]   PCP: ENMANUEL Ferrer CNP  Date of Admission: 12/27/2021  Chief Complaint:-AMS/fatigue    Assessment and Plan:    1. Symptomatic Bradycardia: Likely secondary to carbon monoxide poisoning. Patient was given atropine with no significant improvement. Subsequently placed on dopamine GTT with no significant improvement. Transvenous pacer was placed 12/28. Echo demonstrates EF 50% with mild global hypokinesis of the left ventricle. Patient did not tolerate pacer being turned down overnight. D/w cardiology - plan for [facility] tomorrow AM.   2. Acute Carbon Monoxide Poisoning: Resolving. Likely secondary to heavy smoking. CO 13.9 on arrival.  Trended down to 4.1. Continue with nasal cannula. 3. Primary Hypertension:  Currently hypotensive. Will hold BP outpatient regimen. 4. Severe Opiate Disorder:  Suboxone resumed. 5. Hx of COPD: No inhalers listed on OP regimen. 6. Current Smoker:   regarding smoking cessation  7. Acute Toxic Encephalopathy: Resolved. Likely secondary to CO poisoning. CT head with no acute intracranial process. INITIAL H AND P AND ICU COURSE:  Dean Chandra is a 60year old causasian female admitted to [facility] ICU 12/28/2021 s/p TVP placement for symptomatic bradycardia.      Patient has a significant history of current every day smoker, Severe COPD 2011 FEV1=46%, Essential HPTN, Severe Opioid disorder, Mood disorder.      Santy Grider presented to [facility] ER 12/27/2021 for the evaluation of altered mental status.  History obtained primarily from family at bedside,  and children. Mariama Burkett is able to state that she is not having any current pain.  She acknowledges some difficulty with her memory.   Family reports that the patient has been in the process of weaning off of her Celexa but did not understand the instructions and weaned off over the course of 2 weeks instead of 4.  She did take a half dose yesterday and today hoping it would help her symptoms.  Family denies any concern for intentional overuse/abuse.  States she smokes 2 packs/day and uses marijuana.  Reports rare alcohol use but she did have 2 drinks on [place] and feel her symptoms escalated after that. Sandra Ruff has chronic pain which is generalized but she is complained of worsening back pain since a fall last week for which she was seen in another ED. [facility] states she has been sleeping 12 to 15 hours/day and appears to hallucinate.  She will be dazed and have conversations with herself. Sandra Ruff has occasional jerking behaviors without any visualized seizure activity.  States she was prescribed muscle relaxers after her previous fall but did not take these.  She has complained of chills with mild cough without change from her baseline.  Very poor oral intake without vomiting or diarrhea.  Family states she is currently in the process of getting an outpatient work-up for possible lung cancer.  She was seen in the outpatient office today where she was noted to have oxygen saturation in the mid 80s on room air and sent to the ED for further evaluation.     Family identify home monitoring of SaO2 for the past 4-5 days and \"high 80's\".     12/27/2021: While in the ER patient was found to be bradycardic, lethargic and hypotensive. Atropine was given and Dopamine gtt was started. Narcan 0.4 X1 given. An elevated carbonmonoxide level was detected and patient was placed on 100% NRB Mask. Bradycardia continued despite Dopamine gtt at 7 m/k/m. TVP was placed and patient was admitted to the ICU for further management and care. 12/28/2021: CO level trended down to 4.1. Levophed weaned off. D/w cardiology - will decrease TVP rate to 40 to monitor response. 12/30/2021: Patient became hypotensive with HR in the 40's overnight while pacer was turned down.  Cardiology plans for cath tomorrow AM.    Past Medical History:

## 2021-12-30 NOTE — CARE COORDINATION
12/30/21, 2:09 PM EST    DISCHARGE ON GOING [address] day: 2  Location: -12/012-A Reason for admit: Anorexia [R63.0]  Bradycardia [R00.1]  SHARONA (acute kidney injury) ([facility] [address] [N17.9]  Symptomatic bradycardia [R00.1]  Toxic effect of carbon monoxide, unintentional, initial encounter [T58.91XA]  Hypotension, unspecified hypotension type [I95.9]  Altered mental status, unspecified altered mental status type [R41.82]   Procedure:   12/27 CXR: Slightly increased interstitial markings both upper lung fields, consistent with mild interstitial pneumonitis/fibrosis  12/27 CT Head: No acute findings  12/28 TVP placed  12/28 CVC right subclavian    Barriers to Discharge: Borderline BP's. Still paced. Afebrile. VPaced. Sats 95% on 3L O2. Ox3, not situation. Follows commands. Telemetry, TVP, CVC, n/v checks, SCDs. Suboxone, lovenox. PCP: ENMANUEL Barahona CNP  Readmission Risk Score: 11.1 ( )%  Patient Goals/Plan/Treatment Preferences: Home w/. Monitor for needs and  possible home O2. Will need PT/OT when TVP out.

## 2021-12-31 ENCOUNTER — APPOINTMENT (OUTPATIENT)
Dept: CARDIAC CATH/INVASIVE PROCEDURES | Age: 56
DRG: 917 | End: 2021-12-31
Payer: COMMERCIAL

## 2021-12-31 LAB
ANION GAP SERPL CALCULATED.3IONS-SCNC: 8 MEQ/L (ref 8–16)
APTT: 33.3 SECONDS (ref 22–38)
BUN BLDV-MCNC: 20 MG/DL (ref 7–22)
CALCIUM SERPL-MCNC: 8.5 MG/DL (ref 8.5–10.5)
CHLORIDE BLD-SCNC: 113 MEQ/L (ref 98–111)
CO2: 23 MEQ/L (ref 23–33)
CREAT SERPL-MCNC: 0.8 MG/DL (ref 0.4–1.2)
ERYTHROCYTE [DISTWIDTH] IN BLOOD BY AUTOMATED COUNT: 13.2 % (ref 11.5–14.5)
ERYTHROCYTE [DISTWIDTH] IN BLOOD BY AUTOMATED COUNT: 50.4 FL (ref 35–45)
GFR SERPL CREATININE-BSD FRML MDRD: 73 ML/MIN/1.73M2
GLUCOSE BLD-MCNC: 87 MG/DL (ref 70–108)
HCT VFR BLD CALC: 37.2 % (ref 37–47)
HEMOGLOBIN: 11.8 GM/DL (ref 12–16)
INR BLD: 0.99 (ref 0.85–1.13)
MCH RBC QN AUTO: 32.4 PG (ref 26–33)
MCHC RBC AUTO-ENTMCNC: 31.7 GM/DL (ref 32.2–35.5)
MCV RBC AUTO: 102.2 FL (ref 81–99)
PLATELET # BLD: 214 THOU/MM3 (ref 130–400)
PMV BLD AUTO: 8.7 FL (ref 9.4–12.4)
POTASSIUM REFLEX MAGNESIUM: 4 MEQ/L (ref 3.5–5.2)
RBC # BLD: 3.64 MILL/MM3 (ref 4.2–5.4)
SODIUM BLD-SCNC: 144 MEQ/L (ref 135–145)
WBC # BLD: 8 THOU/MM3 (ref 4.8–10.8)

## 2021-12-31 PROCEDURE — C1894 INTRO/SHEATH, NON-LASER: HCPCS

## 2021-12-31 PROCEDURE — 93458 L HRT ARTERY/VENTRICLE ANGIO: CPT | Performed by: INTERNAL MEDICINE

## 2021-12-31 PROCEDURE — 80048 BASIC METABOLIC PNL TOTAL CA: CPT

## 2021-12-31 PROCEDURE — 6370000000 HC RX 637 (ALT 250 FOR IP): Performed by: STUDENT IN AN ORGANIZED HEALTH CARE EDUCATION/TRAINING PROGRAM

## 2021-12-31 PROCEDURE — C1769 GUIDE WIRE: HCPCS

## 2021-12-31 PROCEDURE — 2580000003 HC RX 258: Performed by: NURSE PRACTITIONER

## 2021-12-31 PROCEDURE — 99233 SBSQ HOSP IP/OBS HIGH 50: CPT | Performed by: INTERNAL MEDICINE

## 2021-12-31 PROCEDURE — 4A023N7 MEASUREMENT OF CARDIAC SAMPLING AND PRESSURE, LEFT HEART, PERCUTANEOUS APPROACH: ICD-10-PCS | Performed by: INTERNAL MEDICINE

## 2021-12-31 PROCEDURE — 6370000000 HC RX 637 (ALT 250 FOR IP): Performed by: NURSE PRACTITIONER

## 2021-12-31 PROCEDURE — B2151ZZ FLUOROSCOPY OF LEFT HEART USING LOW OSMOLAR CONTRAST: ICD-10-PCS | Performed by: INTERNAL MEDICINE

## 2021-12-31 PROCEDURE — 6360000002 HC RX W HCPCS: Performed by: NURSE PRACTITIONER

## 2021-12-31 PROCEDURE — 1200000003 HC TELEMETRY R&B

## 2021-12-31 PROCEDURE — 85730 THROMBOPLASTIN TIME PARTIAL: CPT

## 2021-12-31 PROCEDURE — 6360000004 HC RX CONTRAST MEDICATION

## 2021-12-31 PROCEDURE — 85610 PROTHROMBIN TIME: CPT

## 2021-12-31 PROCEDURE — B2111ZZ FLUOROSCOPY OF MULTIPLE CORONARY ARTERIES USING LOW OSMOLAR CONTRAST: ICD-10-PCS | Performed by: INTERNAL MEDICINE

## 2021-12-31 PROCEDURE — 85027 COMPLETE CBC AUTOMATED: CPT

## 2021-12-31 PROCEDURE — 2500000003 HC RX 250 WO HCPCS

## 2021-12-31 PROCEDURE — 93458 L HRT ARTERY/VENTRICLE ANGIO: CPT

## 2021-12-31 PROCEDURE — 6360000002 HC RX W HCPCS

## 2021-12-31 PROCEDURE — 99232 SBSQ HOSP IP/OBS MODERATE 35: CPT | Performed by: NURSE PRACTITIONER

## 2021-12-31 PROCEDURE — 90792 PSYCH DIAG EVAL W/MED SRVCS: CPT | Performed by: PSYCHIATRY & NEUROLOGY

## 2021-12-31 RX ORDER — SODIUM CHLORIDE 9 MG/ML
25 INJECTION, SOLUTION INTRAVENOUS PRN
Status: DISCONTINUED | OUTPATIENT
Start: 2021-12-31 | End: 2021-12-31 | Stop reason: SDUPTHER

## 2021-12-31 RX ORDER — SODIUM CHLORIDE 0.9 % (FLUSH) 0.9 %
5-40 SYRINGE (ML) INJECTION EVERY 12 HOURS SCHEDULED
Status: DISCONTINUED | OUTPATIENT
Start: 2021-12-31 | End: 2021-12-31 | Stop reason: SDUPTHER

## 2021-12-31 RX ORDER — SODIUM CHLORIDE 0.9 % (FLUSH) 0.9 %
5-40 SYRINGE (ML) INJECTION PRN
Status: DISCONTINUED | OUTPATIENT
Start: 2021-12-31 | End: 2021-12-31 | Stop reason: SDUPTHER

## 2021-12-31 RX ORDER — NITROGLYCERIN 0.4 MG/1
0.4 TABLET SUBLINGUAL EVERY 5 MIN PRN
Status: DISCONTINUED | OUTPATIENT
Start: 2021-12-31 | End: 2022-01-03 | Stop reason: HOSPADM

## 2021-12-31 RX ORDER — SODIUM CHLORIDE 9 MG/ML
50 INJECTION, SOLUTION INTRAVENOUS CONTINUOUS
Status: DISCONTINUED | OUTPATIENT
Start: 2021-12-31 | End: 2022-01-03 | Stop reason: HOSPADM

## 2021-12-31 RX ORDER — FLUOXETINE 10 MG/1
10 CAPSULE ORAL DAILY
Status: DISCONTINUED | OUTPATIENT
Start: 2021-12-31 | End: 2022-01-03 | Stop reason: HOSPADM

## 2021-12-31 RX ADMIN — FLUOXETINE 10 MG: 10 CAPSULE ORAL at 15:35

## 2021-12-31 RX ADMIN — SODIUM CHLORIDE, PRESERVATIVE FREE 10 ML: 5 INJECTION INTRAVENOUS at 09:04

## 2021-12-31 RX ADMIN — ENOXAPARIN SODIUM 30 MG: 100 INJECTION SUBCUTANEOUS at 12:34

## 2021-12-31 RX ADMIN — BUPRENORPHINE AND NALOXONE 1 FILM: 8; 2 FILM BUCCAL; SUBLINGUAL at 12:44

## 2021-12-31 RX ADMIN — SODIUM CHLORIDE, PRESERVATIVE FREE 10 ML: 5 INJECTION INTRAVENOUS at 09:03

## 2021-12-31 ASSESSMENT — PAIN SCALES - GENERAL
PAINLEVEL_OUTOF10: 0

## 2021-12-31 NOTE — PROCEDURES
[address] [place] [address]                            CARDIAC CATHETERIZATION    PATIENT NAME: Alexandrea Boone                      :        1965  MED REC NO:   [id number]                           ROOM:       0012  ACCOUNT NO:   [de-identified]                           ADMIT DATE: 2021  PROVIDER:     Keesha Whitmore MD    DATE OF PROCEDURE:  2021    INDICATION:  1. Symptomatic sinus bradycardia. 2.  Hypotension. 3.  Abnormal EKG. PROCEDURES PERFORMED:  1. Left cardiac catheterization with selective coronary angiogram.  2.  Left ventriculogram.    DESCRIPTION OF PROCEDURE:  After informed consent, the patient was  brought to the cardiac catheterization room. She was prepped and draped  in a sterile fashion. 2% lidocaine was injected in the skin and  subcutaneous tissue overlying the right radial artery. Under ultrasound  guidance using modified Seldinger technique, I obtained access in the  right radial artery. 5/6 Slender sheath was inserted. Standard  antithrombotic/antispasmodic medications were given. I used 5-[facility]  JR4 and 5-[facility] JL3.5 diagnostic catheters to complete the procedure. FINDINGS:  HEMODYNAMICS:  Left ventricular end-diastolic pressure 13 mmHg. No  significant pressure gradient across the aortic valve upon pullback. LEFT VENTRICULOGRAM:  No regional wall motion abnormality. Ejection  fraction estimated at 55%-60%. No significant mitral regurgitation. CORONARY ANGIOGRAM:  1. RIGHT CORONARY ARTERY:  Dominant vessel. Patent without any  significant stenotic lesions. 2.  LEFT MAIN CORONARY ARTERY:  Patent. No significant stenosis. Bifurcates into left circumflex and left anterior descending arteries. 3.  LEFT CIRCUMFLEX ARTERY:  Patent without any significant stenotic  lesions. 4.  LEFT ANTERIOR DESCENDING ARTERY:  LAD is patent without significant  stenotic lesions.   There was a large diagonal branch that is slightly  tortuous without significant stenosis. MEDICATIONS:  See MAR. COMPLICATIONS:  None. ESTIMATED BLOOD LOSS:  Less than 50 mL. ACCESS:  Right radial artery access. Vasc Band was applied. Hemostasis  was achieved. IMPRESSION:  No significant coronary artery disease. RECOMMENDATIONS:  Medical management.         Michel Chin MD    D: 12/31/2021 9:14:49       T: 12/31/2021 9:17:12     [id number]  Job#: [id number]     Doc#: [id number]    CC:

## 2021-12-31 NOTE — BRIEF OP NOTE
[address] [address]  Sedation/Analgesia Post Sedation Record    Pt Name: Ricky Olmedo  Account number: [de-identified]  MRN: [id number]  YOB: 1965  Procedure Performed By: Cesar Cervantes MD MD   Primary Care Physician: Zhane Rondon APRN - CNP  Date: 12/31/2021    POST-PROCEDURE    Physicians/Assistants: Cesar Cervantes MD MD     Procedure Performed:Cath      Sedation/Anesthesia: Versed/ Fentanyl and 2% xylocaine local anesthesia. Estimated Blood Loss: < 50 ml. Specimens Removed: None         Disposition of Specimen: N/A        Complications: No Immediate Complications. Post-procedure Diagnosis/Findings:        No significant CAD      Above findings and plan of care were discussed with patient and her family, questions were answered, agreeable with plan.        Cesar Cervantes MD [facility]   Electronically signed 12/31/2021 at 9:52 AM  Interventional Cardiology

## 2021-12-31 NOTE — CONSULTS
Department of Psychiatry  Consult Service   Psychiatric Assessment        Thank you very much for allowing us to participate in the care of this patient.       Reason for Consult: Depression, off psychiatric medications     HISTORY OF PRESENT ILLNESS:           The patient is a 60 y.o. female with significant history of COPD, depression, anxiety, hypertension, opioid use disorder who is admitted medically s/p TVP placement for symptomatic bradycardia. Patient reports that she is only feeling sad and down because she is here in the hospital.  Reports her mood has been well controlled. Mentions that she has been somewhat compliant with her medications. She discussed about the recent switch from Celexa to Prozac by her outpatient provider Elaina.  Denies any suicidal or homicidal ideation plan or intent today. Per Carecoordination note from [facility]:    Patient presented to [facility] ER on 12/27/2021 for the evaluation of altered mental status.  History was obtained primarily from family at bedside,  and children as patient acknowledged some difficulty with her memory.   Family reported that the patient had been in the process of weaning off of her Celexa but did not understand the instructions and weaned off over the course of 2 weeks instead of 4.  She did take a half dose prior to admission hoping it would help her symptoms.  Family denied any concern for intentional overuse/abuse.  Stated she smokes 2 packs/day and uses marijuana.  Reported rare alcohol use but she did have 2 drinks on [place] and feel her symptoms escalated after that. Sanket Davis has chronic pain which is generalized but had been complaining of worsening back pain since a fall last week for which she was seen in another ED.  Tal reportedly has been sleeping 12 to 15 hours/day and appeared to hallucinate.  She will be dazed and have conversations with herself. Sanket Davis had occasional jerking behaviors without any visualized seizure activity.  Stated she was prescribed muscle relaxers after her previous fall but did not take these.  She has complained of chills with mild cough without change from her baseline.  Very poor oral intake without vomiting or diarrhea.  Family stated she is currently in the process of getting an outpatient work-up for possible lung cancer.  She was seen in the outpatient office before presenting to the ED where she was noted to have oxygen saturation in the mid 80s on room air and sent to the ED for further evaluation.   While in the ER patient was found to be bradycardic, lethargic and hypotensive. Atropine was given and Dopamine gtt was started. Narcan 0.4 X1 given. An elevated carbonmonoxide level was detected and patient was placed on 100% NRB Mask. Bradycardia continued despite Dopamine gtt at 7 m/k/m. TVP was placed and patient was admitted to the ICU for further management and care.      Psychiatry was consulted for depression and medication management.      Patient is seen Aneudy Toledo in bed after her heart catheterization procedure. Her 2 sons are initially present at bedside but leave the room to give the patient privacy. Patient is cooperative but somnolent and minimally engaged secondary to her procedure this morning. She falls asleep throughout the assessment. Patient does report she has been off of her antidepressant medication since all of this happened. She mentions she sees [facility] for medication management and Jaxon Pham recently switched her from Celexa to Prozac when she saw her on 12/14. When asked if she is feeling depressed recently, she said she has not had time to. She denies any recent stressors aside from being admitted to [facility]. Denies any recent suicidal thoughts. She does report she was sleeping and eating good prior to admission. Says her energy was \"piss poor. \"  Motivation was low.   She reports she has been feeling worthless since she was admitted to the hospital. Denies feeling helpless or hopeless.     PSYCHIATRIC HISTORY:  · History of anxiety and depression   · outpatient psychiatric provider:  Brenda CHANEY  · Suicide attempts: denies  · Inpatient psychiatric admissions: denies     Past psychiatric medications includes:      Trazodone, Celexa, Prozac   Adverse reactions from psychotropic medications:     Trazodone- grogginess         Lifetime Psychiatric Review of Systems     ·    Obsessions and Compulsions: Denies    ·    Concepcion or Hypomania: Denies  ·    Hallucinations: Denies  ·    Panic Attacks:  yes  ·    Delusions:  Denies  ·    Phobias:  Denies  ·    Trauma: Denies     Home Medications           Prior to Admission medications    Medication Sig Start Date End Date Taking?  Authorizing Provider   cyclobenzaprine (FLEXERIL) 10 MG tablet Take 1 tablet by mouth 2 times daily as needed for Muscle spasms 12/23/21 1/2/22   Sherry Farah MD   FLUoxetine (PROZAC) 10 MG capsule Take 1 capsule by mouth daily 12/14/21     ENMANUEL Stevens CNP   ondansetron (ZOFRAN) 4 MG tablet TAKE 1 TABLET BY MOUTH THREE TIMES DAILY AS NEEDED FOR NAUSEA OR  VOMITING 12/9/21     ENMANUEL Stevens CNP   citalopram (CELEXA) 40 MG tablet Take 1 tablet by mouth once daily 12/8/21     ENMANUEL Stevens CNP   gabapentin (NEURONTIN) 300 MG capsule Take 300 mg by mouth 3 times daily.       Historical Provider, MD   naloxone 4 MG/0.1ML LIQD nasal spray 1 spray by Nasal route as needed for Opioid Reversal 10/6/21     ENMANUEL Stevens CNP   triamterene-hydrochlorothiazide (MAXZIDE) 75-50 MG per tablet Take 1 tablet by mouth daily.         Historical Provider, MD            Medications:      Current Hospital Medications   Current Facility-Administered Medications: 0.9 % sodium chloride infusion, 50 mL/hr, IntraVENous, Continuous  nitroGLYCERIN (NITROSTAT) SL tablet 0.4 mg, 0.4 mg, SubLINGual, Q5 Min PRN  buprenorphine-naloxone (SUBOXONE) 8-2 MG SL film 1 Film, 1 Film, SubLINGual, BID  sodium chloride flush 0.9 % injection 5-40 mL, 5-40 mL, IntraVENous, 2 times per day  sodium chloride flush 0.9 % injection 5-40 mL, 5-40 mL, IntraVENous, PRN  0.9 % sodium chloride infusion, 25 mL, IntraVENous, PRN  enoxaparin (LOVENOX) injection 30 mg, 30 mg, SubCUTAneous, Daily  ondansetron (ZOFRAN-ODT) disintegrating tablet 4 mg, 4 mg, Oral, Q8H PRN **OR** ondansetron (ZOFRAN) injection 4 mg, 4 mg, IntraVENous, Q6H PRN  polyethylene glycol (GLYCOLAX) packet 17 g, 17 g, Oral, Daily PRN  acetaminophen (TYLENOL) tablet 650 mg, 650 mg, Oral, Q6H PRN **OR** acetaminophen (TYLENOL) suppository 650 mg, 650 mg, Rectal, Q6H PRN  albuterol (PROVENTIL) nebulizer solution 2.5 mg, 2.5 mg, Nebulization, Q4H PRN         Past Medical History:    Past Medical History             Diagnosis Date    COPD (chronic obstructive pulmonary disease) ([facility] [address]      High blood pressure              Past Surgical History:    Past Surgical History             Procedure Laterality Date    CYST REMOVAL         vagina    HERNIA REPAIR   4-2002    LAPAROSCOPY   1994?  TUBAL LIGATION   4-2002    WRIST SURGERY                Allergies: Patient has no known allergies.       Social History:       RESIDENCE: Patient was born and raised in [place] [facility] [place]. She currently lives in [place] with her  and daughter  :                    CHILDREN: 11 (3 sons and 2 daughters)  OCCUPATION: unemployed   EDUCATION: graduated high school      SUBSTANCE USE HISTORY: Patient has a history of dependence to oral pain medications. Has been on Suboxone since October 2021 which is managed by Linda Dubois. Denies any alcohol use.     Smokes 1 PPD           Family Medical and Psychiatric History:      Denies any family psychiatric history      Family History             Problem Relation Age of Onset    Heart Disease Mother      Emphysema Mother      Esophageal Cancer Father      Cancer Father            Physical  BP (!) 145/69   Pulse (!) 49   Temp 97.6 °F (36.4 °C) (Oral)   Resp 15   Ht 5' 5.5\" (1.664 m)   Wt 131 lb 6.3 oz (59.6 kg)   SpO2 90%   BMI 21.53 kg/m²      General Appearance: alert and oriented to person, place and time, in no acute distress  Cardiovascular: normal rate, regular rhythm, normal S1 and S2, no murmurs, rubs, clicks, or gallops, distal pulses intact,   Pulmonary/Chest: clear to auscultation bilaterally- no wheezes, rales or rhonchi, normal air movement, no respiratory distress  Abdomen: soft, non-tender, non-distended, normal bowel sounds, no masses   Extremities: no cyanosis, clubbing or edema, pulses present  Musculoskeletal: normal range of motion, no joint swelling, deformity or tenderness  Neurological: alert, oriented, normal speech, no focal findings or movement disorder noted     Mental Status Examination:  Level of consciousness:  somnolent  Appearance: hospital attire, lying in bed, fair grooming  Behavior/Motor: withdrawn, falls asleep throughout assessment   Attitude toward examiner:  Cooperative but minimally engaged. Fair eye contact  Speech:  normal rate and low volume  Mood:  Tired per patient   Affect: mood congruent  Thought processes:  Linear, goal directed, coherent  Thought content: denies suicidal ideations              denies homicidal ideations               denies hallucinations              No evidence of delusions  Cognition:  Oriented to self, situation, location, date  Concentration clinically adequate  Memory age appropriate  Insight & Judgment:  fair     DSM-5 DIAGNOSIS:       Major depressive disorder, recurrent, moderate  HUSSEIN  Opioid use disorder, severe, on partial agonist therapy      General Medical Condition       Patient Active Problem List   Diagnosis Code    Symptomatic bradycardia R00.1    Toxic effect of carbon monoxide, unintentional T58. 91XA    Altered mental status R41.82    Severe malnutrition ([facility] [address] E43         Stressors Severity of stressors is moderate  Source of stressors include: Other psychosocial and environmental stressors     RECOMMENDATIONS:    Continue Suboxone 8-2mg BID  Resume Prozac 10mg daily. Will not resume Celexa as she was instructed to wean off of it and stop taking it completely. Patient can be discharged home once medically stable and follow up with Lexy Lowry for medication management   Psychiatry will sign off. Please call back with any questions.      Electronically signed by Harlow Dubin, MD on 12/31/21 at 1:09 PM EST

## 2021-12-31 NOTE — PROGRESS NOTES
further evaluation.     12/28/2021: Overnight, patient continued to have hypotension and was initially given 1000mL 0.9% NS bolus, dopamine infusion and 0.5mg atropine push. Dopamine switched to levophed. Temporary pacemaker was placed at bedside. Today, she completed her echo and are waiting on results. A thorough evaluation was not completed at bedside today as she was drowsy and wanted to sleep. Attempts at pacemaker removal were made earlier this AM but patient's heart rate dropped into 40s at those times. Current BP is 95/65. Carboxyhemoglobin level at 5.5 this AM. WBC increased from 12 to 13.7. Creatinine improved to 1.4.       Subjective (Events in last 24 hours):     Pt awake and able to answer questions appropriately     No CP   Echo reviewed     Tele SR HR 70  Not using TVP   On minimal dose nor-epi      12/30/2021  Called back to see pt as hr dropped to 40 and BP dropped to 87/64 last walt while sleeping -- pt was off pressor agents - turned TVP up to 60 -- she has been paced then     Pt denies symptoms with this episode     Talking with pt today -- all she wants to do is sleep and be \"left alone\"  Per nursing staff she will get oob into a chair and eat - not much and then gop back to bed to sleep   She is hesitant about heart cath that ICU wants her to have - I did discuss with Dr. Mari Beatty - hes ok with heart cath dt smoking hx      Discussed with pt and daughter  - cath procedure     Discussed with daughter in the hallway re: depression and pt not wanting to do anything here - daughter states that is abnormal for her mom- she is usually up and about all the time   daughter thinks she is depressed - discussed about psych - daughter would like for them to see her     Daughter was telling us about her mother being addicted to percocet - shes been clean for 4 months now          12/31/2021  Pt in bed - she smiled at me when I came in   Discussed heart cath with her and taking out TVP and possible going home tomorrow     Pt immediately turns over in bed and says \"I wanna lay down and sleep for a while before this comes out\"    Tele - SR HR 50's   Per staff was using TVP - set at 45 overnight intermittently   BP improved today     Objective:   /72   Pulse 52   Temp 97.6 °F (36.4 °C) (Oral)   Resp 14   Ht 5' 5.5\" (1.664 m)   Wt 131 lb 6.3 oz (59.6 kg)   SpO2 94%   BMI 21.53 kg/m²        TELEMETRY: SR HR 50-70    Physical Exam:  General Appearance: alert and oriented to person, place and time, in no acute distress  Cardiovascular: normal rate, regular rhythm, normal S1 and S2, no murmurs, rubs, clicks, or gallops, distal pulses intact,   Pulmonary/Chest: clear to auscultation bilaterally- no wheezes, rales or rhonchi, normal air movement, no respiratory distress  Abdomen: soft, non-tender, non-distended, normal bowel sounds, no masses Extremities: no cyanosis, clubbing or edema, pulses present    Musculoskeletal: normal range of motion, no joint swelling, deformity or tenderness  Neurological: alert, oriented, normal speech, no focal findings or movement disorder noted    Medications:    buprenorphine-naloxone  1 Film SubLINGual BID    sodium chloride flush  5-40 mL IntraVENous 2 times per day    enoxaparin  30 mg SubCUTAneous Daily      sodium chloride 50 mL/hr (12/31/21 0903)    sodium chloride 20 mL/hr at 12/31/21 0530     nitroGLYCERIN, 0.4 mg, Q5 Min PRN  sodium chloride flush, 5-40 mL, PRN  sodium chloride, 25 mL, PRN  ondansetron, 4 mg, Q8H PRN   Or  ondansetron, 4 mg, Q6H PRN  polyethylene glycol, 17 g, Daily PRN  acetaminophen, 650 mg, Q6H PRN   Or  acetaminophen, 650 mg, Q6H PRN  albuterol, 2.5 mg, Q4H PRN        Diagnostics:    Echo:    Electronically signed by Li Madrid MD (Interpreting   physician) on 12/28/2021 at 06:22 PM   ----------------------------------------------------------------      Findings      Mitral Valve   Mild mitral regurgitation is present.       Aortic Valve Aortic valve appears tricuspid. Aortic valve leaflets are somewhat thickened. Tricuspid Valve   Trivial tricuspid regurgitation visualized. Pulmonic Valve   The pulmonic valve was not well visualized . Trivial pulmonic regurgitation visualized. Left Atrium   Left atrial size was normal.      Left Ventricle   Ejection fraction is visually estimated at 50%. There was mild global hypokinesis of the left ventricle. Right Atrium   Right atrial size was normal.      Right Ventricle   The right ventricular size was normal with normal systolic function and   wall thickness. Pericardial Effusion   The pericardium was normal in appearance with no evidence of a pericardial   effusion. Pleural Effusion   No evidence of pleural effusion. Aorta / Great Vessels   -Aortic root dimension within normal limits.   -The Pulmonary artery is within normal limits. -IVC size is within normal limits with normal respiratory phasic changes. Cath:  Post-procedure Diagnosis/Findings:                                   · No significant CAD                      Above findings and plan of care were discussed with patient and her family, questions were answered, agreeable with plan.         Jonathan Rios MD, [facility]   Electronically signed 12/31/2021 at 9:52 AM  Interventional Cardiology    Lab Data:    Cardiac Enzymes:  No results for input(s): CKTOTAL, CKMB, CKMBINDEX, TROPONINI in the last 72 hours.     CBC:   Lab Results   Component Value Date    WBC 8.0 12/31/2021    RBC 3.64 12/31/2021    HGB 11.8 12/31/2021    HCT 37.2 12/31/2021     12/31/2021       CMP:    Lab Results   Component Value Date     12/31/2021    K 4.0 12/31/2021     12/31/2021    CO2 23 12/31/2021    BUN 20 12/31/2021    CREATININE 0.8 12/31/2021    LABGLOM 73 12/31/2021    GLUCOSE 87 12/31/2021    CALCIUM 8.5 12/31/2021       Hepatic Function Panel:    Lab Results   Component Value Date    ALKPHOS 78 12/28/2021    ALT 13 12/28/2021    AST 22 12/28/2021    PROT 5.7 12/28/2021    BILITOT 0.3 12/28/2021    LABALBU 3.6 12/28/2021       Magnesium:  No results found for: MG    PT/INR:    Lab Results   Component Value Date    INR 0.99 12/31/2021       HgBA1c:  No results found for: LABA1C    FLP:  No results found for: TRIG, HDL, LDLCALC, LDLDIRECT, LABVLDL    TSH:    Lab Results   Component Value Date    TSH 0.822 12/27/2021         Assessment:    AMS -- resolved     Carbon monoxide poisoning - resolved    Secondary to chain smoking      Symptomatic bradycardia - resolved HR 50-70  Pt doesn't need ppm --- she has chronotropic response with hr 70's  * discussed with Dr. Erick Do (EP) case - he recommends pull TVP - 30 day event and follow as OP since she has no symptoms     Hypotension - resolved   Dehydration - improving       Plan:  · DC TVP   · After discussing case with Dr. Erick Do (EP) DC TVP-- 30 day event and follow as OP in EP clinic          Electronically signed by ENMANUEL Meade CNP on 12/31/2021 at 12:26 PM

## 2021-12-31 NOTE — PROGRESS NOTES
CRITICAL CARE PROGRESS NOTE      Patient:  Diana Berman    Unit/Bed:4D-12/012-A  YOB: 1965  MRN: [id number]   PCP: ENMANUEL Abreu CNP  Date of Admission: 12/27/2021  Chief Complaint:-AMS/fatigue    Assessment and Plan:    1. Symptomatic Bradycardia: Resolved. Likely was secondary to carbon monoxide poisoning. LHC performed 12/31, no CAD. Transvenous pacer was placed 12/28 and removed 12/31. As per cardiology, patient does require a PPM at this time - plan to discharge patient with cardiac event monitor and follow up with Dr. Richardson Young () as outpatient after one month. 2. Acute Carbon Monoxide Poisoning: Resolved. Likely secondary to heavy smoking. CO 13.9 on arrival.  Trended down to 4.1. Continue with nasal cannula. 3. Primary Hypertension:  Currently hypotensive. Will hold BP outpatient regimen. 4. Severe Opiate Disorder:  Suboxone resumed. As per psych, OK to resume patient's Prozac 10 mg.  5. Hx of COPD: No inhalers listed on OP regimen. 6. Current Smoker:   regarding smoking cessation  7. Acute Toxic Encephalopathy: Resolved. Likely secondary to CO poisoning. CT head with no acute intracranial process. 8. Disposition: Transfer to med/surg. Patient will need PT/OT, home O2 eval. Event monitor on discharge. Discharge to home with . INITIAL H AND P AND ICU COURSE:  Campbell Heath is a 60year old causasian female admitted to [facility] ICU 12/28/2021 s/p TVP placement for symptomatic bradycardia.      Patient has a significant history of current every day smoker, Severe COPD 2011 FEV1=46%, Essential HPTN, Severe Opioid disorder, Mood disorder.      Candance Frizzle presented to [facility] ER 12/27/2021 for the evaluation of altered mental status.  History obtained primarily from family at bedside,  and children. Ector Wu is able to state that she is not having any current pain.  She acknowledges some difficulty with her memory.   Family reports that the patient has been in the process of weaning off of her Celexa but did not understand the instructions and weaned off over the course of 2 weeks instead of 4.  She did take a half dose yesterday and today hoping it would help her symptoms.  Family denies any concern for intentional overuse/abuse.  States she smokes 2 packs/day and uses marijuana.  Reports rare alcohol use but she did have 2 drinks on [place] and feel her symptoms escalated after that. Fermín Butler has chronic pain which is generalized but she is complained of worsening back pain since a fall last week for which she was seen in another ED. [facility] states she has been sleeping 12 to 15 hours/day and appears to hallucinate.  She will be dazed and have conversations with herself. Fermín Butler has occasional jerking behaviors without any visualized seizure activity.  States she was prescribed muscle relaxers after her previous fall but did not take these.  She has complained of chills with mild cough without change from her baseline.  Very poor oral intake without vomiting or diarrhea.  Family states she is currently in the process of getting an outpatient work-up for possible lung cancer.  She was seen in the outpatient office today where she was noted to have oxygen saturation in the mid 80s on room air and sent to the ED for further evaluation.     Family identify home monitoring of SaO2 for the past 4-5 days and \"high 80's\".     12/27/2021: While in the ER patient was found to be bradycardic, lethargic and hypotensive. Atropine was given and Dopamine gtt was started. Narcan 0.4 X1 given. An elevated carbonmonoxide level was detected and patient was placed on 100% NRB Mask. Bradycardia continued despite Dopamine gtt at 7 m/k/m. TVP was placed and patient was admitted to the ICU for further management and care. 12/28/2021: CO level trended down to 4.1. Levophed weaned off. D/w cardiology - will decrease TVP rate to 40 to monitor response.      12/30/2021: Patient became hypotensive with HR in the 40's overnight while pacer was turned down. Cardiology plans for cath tomorrow AM.    2021: [facility] shows no evidence of CAD. TVP removed. As per Cardiology, patient will need event monitor on discharge and will follow up with Dr. Nikky Qiu in office in one month. Patient stable for transfer to med/surg. Will need PT/OT, home O2 eval prior to discharge. Past Medical History:  See HPI. Family History: Mother -CAD, emphysema, father-, esophageal cancer. Social History: Current everyday smoker, denies any alcohol, or illicit drug use. Patient is . ROS   Negative for all. Scheduled Meds:   sodium chloride flush  5-40 mL IntraVENous 2 times per day    buprenorphine-naloxone  1 Film SubLINGual BID    sodium chloride flush  5-40 mL IntraVENous 2 times per day    enoxaparin  30 mg SubCUTAneous Daily     Continuous Infusions:   sodium chloride      sodium chloride      sodium chloride 20 mL/hr at 21 0530       PHYSICAL EXAMINATION:  T:  98.2. P:  54. RR: 14. B/P: 133/78. O2 Sat: 97.  I/O:  Net +3.6 L since admission  Body mass index is 21.53 kg/m². General:   Acutely ill-appearing  female   HEENT:  normocephalic and atraumatic. No scleral icterus. PERR  Neck: supple. No Thyromegaly. Lungs: clear to auscultation. No retractions  Cardiac: RRR. No JVD. Abdomen: soft. Nontender. Extremities:  No clubbing, cyanosis, or edema x 4. Vasculature: capillary refill < 3 seconds. Palpable dorsalis pedis pulses. Skin:  warm and dry. Psych:  Alert and oriented x3. Affect appropriate  Lymph:  No supraclavicular adenopathy. Neurologic:  No focal deficit. No seizures. Data: (All radiographs, tracings, PFTs, and imaging are personally viewed and interpreted unless otherwise noted).     Sodium 144, potassium 4.0, chloride 113, bicarbonate 23, BUN 20, Cr 0.8, AG 8.0, glucose 73, calcium 8.5   WBC 8.0, Hgb 11.8, HCT 37.2, .2, Platelet 938   INR 3.36, aPTT 33.3   Telemetry shows paced rhythm    Seen with multidisciplinary ICU team.  Meets Continued ICU Level Care Criteria:    [] Yes   [x] No - Transfer Planned to listed location: med/surg with tele  [] HOSPITALIST CONTACTED-      Case and plan discussed with Dr. Bo Grewal.     Electronically signed by Sreedhar Mendiola MD  CRITICAL CARE SPECIALIST

## 2021-12-31 NOTE — CARE COORDINATION
**This is a psychiatric care coordination note. This is not to be used for billing purposes. **    Department of Psychiatry  Consult Service   Psychiatric Assessment      Thank you very much for allowing us to participate in the care of this patient. Reason for Consult: Depression, off psychiatric medications    HISTORY OF PRESENT ILLNESS:          The patient is a 60 y.o. female with significant history of COPD, depression, anxiety, hypertension, opioid use disorder who is admitted medically s/p TVP placement for symptomatic bradycardia. Patient presented to [facility] ER on 12/27/2021 for the evaluation of altered mental status. Floydene Diann was obtained primarily from family at bedside,  and children as patient acknowledged some difficulty with her memory.   Family reported that the patient had been in the process of weaning off of her Celexa but did not understand the instructions and weaned off over the course of 2 weeks instead of 4.  She did take a half dose prior to admission hoping it would help her symptoms.  Family denied any concern for intentional overuse/abuse.  Stated she smokes 2 packs/day and uses marijuana.  Reported rare alcohol use but she did have 2 drinks on [place] and feel her symptoms escalated after that. Kathleen Paredes has chronic pain which is generalized but had been complaining of worsening back pain since a fall last week for which she was seen in another ED.  Tal reportedly has been sleeping 12 to 15 hours/day and appeared to hallucinate.  She will be dazed and have conversations with herself. Kathleen Paredes had occasional jerking behaviors without any visualized seizure activity.  Stated she was prescribed muscle relaxers after her previous fall but did not take these.  She has complained of chills with mild cough without change from her baseline.  Very poor oral intake without vomiting or diarrhea.  Family stated she is currently in the process of getting an outpatient work-up for possible lung cancer. Carlos Peterson was seen in the outpatient office before presenting to the ED where she was noted to have oxygen saturation in the mid 80s on room air and sent to the ED for further evaluation.   While in the ER patient was found to be bradycardic, lethargic and hypotensive. Atropine was given and Dopamine gtt was started. Narcan 0.4 X1 given. An elevated carbonmonoxide level was detected and patient was placed on 100% NRB Mask. Bradycardia continued despite Dopamine gtt at 7 m/k/m. TVP was placed and patient was admitted to the ICU for further management and care. Psychiatry was consulted for depression and medication management. Patient is seen Justino Neri in bed after her heart catheterization procedure. Her 2 sons are initially present at bedside but leave the room to give the patient privacy. Patient is cooperative but somnolent and minimally engaged secondary to her procedure this morning. She falls asleep throughout the assessment. Patient does report she has been off of her antidepressant medication since all of this happened. She mentions she sees Madelaine Mcknight for medication management and Ramez Sparks recently switched her from Celexa to Prozac when she saw her on 12/14. When asked if she is feeling depressed recently, she said she has not had time to. She denies any recent stressors aside from being admitted to [facility]. Denies any recent suicidal thoughts. She does report she was sleeping and eating good prior to admission. Says her energy was \"piss poor. \"  Motivation was low. She reports she has been feeling worthless since she was admitted to the hospital.  Denies feeling helpless or hopeless.     PSYCHIATRIC HISTORY:  · History of anxiety and depression   · outpatient psychiatric provider:  Madelaine Mcknight APRN-CNP  · Suicide attempts: denies  · Inpatient psychiatric admissions: denies    Past psychiatric medications includes:     Trazodone, Celexa, Prozac   Adverse reactions from psychotropic medications:    Trazodone- grogginess       [facility] Psychiatric Review of Systems    ·    Obsessions and Compulsions: Denies    ·    Concepcion or Hypomania: Denies  ·    Hallucinations: Denies  ·    Panic Attacks:  yes  ·    Delusions:  Denies  ·    Phobias:  Denies  ·    Trauma: Denies    Prior to Admission medications    Medication Sig Start Date End Date Taking? Authorizing Provider   cyclobenzaprine (FLEXERIL) 10 MG tablet Take 1 tablet by mouth 2 times daily as needed for Muscle spasms 12/23/21 1/2/22  Vivien Cifuentes MD   FLUoxetine (PROZAC) 10 MG capsule Take 1 capsule by mouth daily 12/14/21   Hodan Simpson, APRN - CNP   ondansetron (ZOFRAN) 4 MG tablet TAKE 1 TABLET BY MOUTH THREE TIMES DAILY AS NEEDED FOR NAUSEA OR  VOMITING 12/9/21   Hodan Simpson, APRN - CNP   citalopram (CELEXA) 40 MG tablet Take 1 tablet by mouth once daily 12/8/21   Hodan Simpson, APRN - CNP   gabapentin (NEURONTIN) 300 MG capsule Take 300 mg by mouth 3 times daily. Historical Provider, MD   naloxone 4 MG/0.1ML LIQD nasal spray 1 spray by Nasal route as needed for Opioid Reversal 10/6/21   [facility] Simpson APRN - CNP   triamterene-hydrochlorothiazide (MAXZIDE) 75-50 MG per tablet Take 1 tablet by mouth daily.       Historical Provider, MD        Medications:    Current Facility-Administered Medications: 0.9 % sodium chloride infusion, 50 mL/hr, IntraVENous, Continuous  nitroGLYCERIN (NITROSTAT) SL tablet 0.4 mg, 0.4 mg, SubLINGual, Q5 Min PRN  buprenorphine-naloxone (SUBOXONE) 8-2 MG SL film 1 Film, 1 Film, SubLINGual, BID  sodium chloride flush 0.9 % injection 5-40 mL, 5-40 mL, IntraVENous, 2 times per day  sodium chloride flush 0.9 % injection 5-40 mL, 5-40 mL, IntraVENous, PRN  0.9 % sodium chloride infusion, 25 mL, IntraVENous, PRN  enoxaparin (LOVENOX) injection 30 mg, 30 mg, SubCUTAneous, Daily  ondansetron (ZOFRAN-ODT) disintegrating tablet 4 mg, 4 mg, Oral, Q8H PRN **OR** ondansetron (ZOFRAN) injection 4 mg, 4 mg, IntraVENous, Q6H PRN  polyethylene glycol (GLYCOLAX) packet 17 g, 17 g, Oral, Daily PRN  acetaminophen (TYLENOL) tablet 650 mg, 650 mg, Oral, Q6H PRN **OR** acetaminophen (TYLENOL) suppository 650 mg, 650 mg, Rectal, Q6H PRN  albuterol (PROVENTIL) nebulizer solution 2.5 mg, 2.5 mg, Nebulization, Q4H PRN     Past Medical History:        Diagnosis Date    COPD (chronic obstructive pulmonary disease) ([address]     High blood pressure        Past Surgical History:        Procedure Laterality Date    CYST REMOVAL      vagina    HERNIA REPAIR  4-2002    LAPAROSCOPY  1994?  TUBAL LIGATION  4-2002    WRIST SURGERY         Allergies: Patient has no known allergies. Social History:      RESIDENCE: Patient was born and raised in [place]. She currently lives in [facility] with her  and daughter  :      CHILDREN: 11 (3 sons and 2 daughters)  OCCUPATION: unemployed   EDUCATION: graduated high school     SUBSTANCE USE HISTORY: Patient has a history of dependence to oral pain medications. Has been on Suboxone since October 2021 which is managed by Vin Patterson. Denies any alcohol use.     Smokes 1 PPD        Family Medical and Psychiatric History:     Denies any family psychiatric history         Problem Relation Age of Onset    Heart Disease Mother     Emphysema Mother     Esophageal Cancer Father     Cancer Father          Physical  BP (!) 145/69   Pulse (!) 49   Temp 97.6 °F (36.4 °C) (Oral)   Resp 15   Ht 5' 5.5\" (1.664 m)   Wt 131 lb 6.3 oz (59.6 kg)   SpO2 90%   BMI 21.53 kg/m²     General Appearance: alert and oriented to person, place and time, in no acute distress  Cardiovascular: normal rate, regular rhythm, normal S1 and S2, no murmurs, rubs, clicks, or gallops, distal pulses intact,   Pulmonary/Chest: clear to auscultation bilaterally- no wheezes, rales or rhonchi, normal air movement, no respiratory distress  Abdomen: soft, non-tender, non-distended, normal bowel sounds, no masses   Extremities: no cyanosis, clubbing or edema, pulses present  Musculoskeletal: normal range of motion, no joint swelling, deformity or tenderness  Neurological: alert, oriented, normal speech, no focal findings or movement disorder noted    Mental Status Examination:  Level of consciousness:  somnolent  Appearance: hospital attire, lying in bed, fair grooming  Behavior/Motor: withdrawn, falls asleep throughout assessment   Attitude toward examiner:  Cooperative but minimally engaged. Fair eye contact  Speech:  normal rate and low volume  Mood:  Tired per patient   Affect: mood congruent  Thought processes:  Linear, goal directed, coherent  Thought content: denies suicidal ideations   denies homicidal ideations    denies hallucinations   No evidence of delusions  Cognition:  Oriented to self, situation, location, date  Concentration clinically adequate  Memory age appropriate  Insight & Judgment:  fair    DSM-5 DIAGNOSIS:      Major depressive disorder, recurrent, moderate  HUSSEIN  Opioid use disorder, severe, on partial agonist therapy     General Medical Condition  Patient Active Problem List   Diagnosis Code    Symptomatic bradycardia R00.1    Toxic effect of carbon monoxide, unintentional T58. 91XA    Altered mental status R41.82    Severe malnutrition ([address] E43       Stressors     Severity of stressors is moderate  Source of stressors include: Other psychosocial and environmental stressors    RECOMMENDATIONS:    Continue Suboxone 8-2mg BID  Resume Prozac 10mg daily. Will not resume Celexa as she was instructed to wean off of it and stop taking it completely. Patient can be discharged home once medically stable and follow up with Sonda Goldberg for medication management   Psychiatry will sign off. Please call back with any questions.      Electronically signed by Vaibhav Downs PA-C on 12/31/21 at 12:18 PM EST

## 2022-01-01 LAB
EKG ATRIAL RATE: 55 BPM
EKG P AXIS: 83 DEGREES
EKG P-R INTERVAL: 178 MS
EKG Q-T INTERVAL: 426 MS
EKG QRS DURATION: 68 MS
EKG QTC CALCULATION (BAZETT): 407 MS
EKG R AXIS: 34 DEGREES
EKG T AXIS: 74 DEGREES
EKG VENTRICULAR RATE: 55 BPM

## 2022-01-01 PROCEDURE — 94760 N-INVAS EAR/PLS OXIMETRY 1: CPT

## 2022-01-01 PROCEDURE — 93005 ELECTROCARDIOGRAM TRACING: CPT | Performed by: NURSE PRACTITIONER

## 2022-01-01 PROCEDURE — 6360000002 HC RX W HCPCS: Performed by: NURSE PRACTITIONER

## 2022-01-01 PROCEDURE — 6370000000 HC RX 637 (ALT 250 FOR IP): Performed by: STUDENT IN AN ORGANIZED HEALTH CARE EDUCATION/TRAINING PROGRAM

## 2022-01-01 PROCEDURE — 1200000003 HC TELEMETRY R&B

## 2022-01-01 PROCEDURE — 2580000003 HC RX 258: Performed by: NURSE PRACTITIONER

## 2022-01-01 PROCEDURE — 99233 SBSQ HOSP IP/OBS HIGH 50: CPT | Performed by: HOSPITALIST

## 2022-01-01 PROCEDURE — 94640 AIRWAY INHALATION TREATMENT: CPT

## 2022-01-01 RX ADMIN — FLUOXETINE 10 MG: 10 CAPSULE ORAL at 10:53

## 2022-01-01 RX ADMIN — SODIUM CHLORIDE 50 ML/HR: 9 INJECTION, SOLUTION INTRAVENOUS at 12:38

## 2022-01-01 RX ADMIN — SODIUM CHLORIDE, PRESERVATIVE FREE 10 ML: 5 INJECTION INTRAVENOUS at 10:54

## 2022-01-01 RX ADMIN — ALBUTEROL SULFATE 2.5 MG: 2.5 SOLUTION RESPIRATORY (INHALATION) at 07:45

## 2022-01-01 RX ADMIN — ENOXAPARIN SODIUM 30 MG: 100 INJECTION SUBCUTANEOUS at 10:53

## 2022-01-01 ASSESSMENT — PAIN SCALES - GENERAL
PAINLEVEL_OUTOF10: 0
PAINLEVEL_OUTOF10: 0

## 2022-01-01 NOTE — PROGRESS NOTES
Hospitalist Progress Note      Patient:  Pily Awan    Unit/Bed:-01/001-A  YOB: 1965  MRN: [id number]   Acct: [de-identified]   PCP: ENMANUEL Maldonado CNP  Date of Admission: 12/27/2021    Assessment/Plan:    1. Query Symptomatic Bradycardia: Resolved. possibly d/t hypoxia? LHC performed 12/31, no CAD. Transvenous pacer was placed 12/28 and removed 12/31. K/TSH WNLs. Mg ordered. As per cardiology, patient does not require a PPM at this time - plan to discharge patient with cardiac event monitor and follow up with EPS outpatient after one month. 2. Possible acute hypxoic/hyperfxapneic resp failure?! 2/2 meds? Resolved. 3. Possible Carbon Monoxide Poisoning: Resolved. No inhalational injury/smoke exposure Likely secondary to heavy smoking. CO 13.9 on arrival.  Trended down to 4.1.    4. Query Hx of Primary Hypertension:  No documented home meds; Currently normotensive. Monitor  5. Opioid dependence Disorder:  Suboxone resumed. Also on Fuloxetine; psych following. 6. Query Hx of COPD: No inhalers listed on OP regimen. 7. Active tobacco smoker: counseling and nicotine patch offered. 8. Acute Toxic Encephalopathy: Resolved. W/u non-contributory  9. HyperNa: mild, na 144; likely d/t inadequate free water intake; encourage hydration and trend  10. Chronic macrocytic anemia: Hb stable. Anemia W/U sent. 11. Malnutrition w/ hypoalbuminemia: placed on nutritional supplements  12. Disposition: PT/OT to follow w/ planned discharge w/ Event monitor      Chief Complaint: AMS    Initial H and P:-    Admitted under ICU for evaluation and management of symptomatic bradycardia. Cardio and EPS following. Pt transferred to  overnight. I took over care on 1/1/2022. Subjective (past 24 hours):   disengaged in conversation; denies CP/SOB. dizziness/presyncope/palpitation/fever/chills/urinary symptoms      Past medical history, family history, social history and allergies reviewed again and is unchanged since admission. ROS (All review of systems completed. Pertinent positives noted. Otherwise All other systems reviewed and negative.)     Medications:  Reviewed    Infusion Medications    sodium chloride 50 mL/hr (12/31/21 0903)    sodium chloride 20 mL/hr at 12/31/21 0530     Scheduled Medications    FLUoxetine  10 mg Oral Daily    buprenorphine-naloxone  1 Film SubLINGual BID    sodium chloride flush  5-40 mL IntraVENous 2 times per day    enoxaparin  30 mg SubCUTAneous Daily     PRN Meds: nitroGLYCERIN, sodium chloride flush, sodium chloride, ondansetron **OR** ondansetron, polyethylene glycol, acetaminophen **OR** acetaminophen, albuterol    No intake or output data in the 24 hours ending 01/01/22 1016    Diet:  ADULT ORAL NUTRITION SUPPLEMENT; Breakfast, Lunch, Dinner; Standard High Calorie/High Protein Oral Supplement  ADULT DIET; Regular    Exam:  BP (!) 147/72   Pulse (!) 48   Temp 98.1 °F (36.7 °C) (Oral)   Resp 12   Ht 5' 5.5\" (1.664 m)   Wt 113 lb 6.4 oz (51.4 kg)   SpO2 96%   BMI 18.58 kg/m²   General appearance: A&O x3, malnourished and unkempt; not ill or toxic, in no apparent distress  HEENT:  Normal cephalic, atraumatic without obvious deformity. Pupils equal, round, and reactive to light. Extra ocular muscles intact. Conjunctivae/corneas clear. Neck: Supple, with full range of motion. No jugular venous distention. Trachea midline. Respiratory:  Normal respiratory effort. NL A/E bilat with no adventitious sounds   Cardiovascular:  normal S1/S2 with no murmurs/gallops  Abdomen: Soft, non-tender, non-distended, no rigidity or peritoneal signs  Musculoskeletal: NL symmetrical A/PROM bilat U/L extremities   Skin: No rashes or lesions. No edema  Neurologic:  CN II-XII intact. NL symmetrical reflexes. NL gait and stance. NL Cerebellar exam. Power 5/5 all muscle groups U/L extremities.  Toes downgoing  Psychiatric: Alert and oriented, thought content appropriate, normal insight  Capillary Refill: Brisk,< 3 seconds   Peripheral Pulses: +2 palpable, equal bilaterally     Labs:   Recent Labs     12/29/21  1300 12/30/21  0445 12/31/21 0413   WBC 9.7 8.6 8.0   HGB 12.4 12.4 11.8*   HCT 38.9 39.5 37.2    226 214     Recent Labs     12/29/21  1300 12/30/21  0445 12/31/21 0413    142 144   K 4.2 4.1 4.0   * 113* 113*   CO2 23 22* 23   BUN 27* 23* 20   CREATININE 0.9 0.8 0.8   CALCIUM 8.5 8.3* 8.5     No results for input(s): AST, ALT, BILIDIR, BILITOT, ALKPHOS in the last 72 hours. Recent Labs     12/31/21 0413   INR 0.99     No results for input(s): Jen Nehemiah in the last 72 hours. Microbiology:    Blood culture #1:   Lab Results   Component Value Date    BC No growth-preliminary  12/27/2021       Blood culture #2:No results found for: [facility]    Organism:No results found for: ORG    No results found for: LABGRAM    MRSA culture only:No results found for: [facility]    Urine culture: No results found for: LABURIN    Respiratory culture: No results found for: CULTRESP    Aerobic and Anaerobic :  No results found for: LABAERO  No results found for: LABANAE    Urinalysis:      Lab Results   Component Value Date    NITRU NEGATIVE 12/27/2021    BLOODU NEGATIVE 12/27/2021    GLUCOSEU NEGATIVE 12/27/2021       Radiology:  XR CHEST PORTABLE   Final Result   1. Right subclavian line distal tip overlying superior vena cava. Right    central line with distal tip likely overlying the right ventricle. No    pneumothorax. 2. Chronic appearing densities bilaterally without acute cardiopulmonary    process. This document has been electronically signed by: Aishwarya Lombardo DO on    12/28/2021 02:42 AM      XR CHEST PORTABLE   Final Result   1. Normal heart size. Right subclavian line has been inserted with tip in superior vena cava. No pneumothorax is seen.    2. Findings suggestive of mild interstitial pneumonitis/fibrosis both upper lung fields and possibly also right lung base at this time. **This report has been created using voice recognition software. It may contain minor errors which are inherent in voice recognition technology. **      Final report electronically signed by Dr. Adeline Prince on 12/27/2021 11:16 PM      CT HEAD WO CONTRAST   Final Result   No acute intracranial process. .          **This report has been created using voice recognition software. It may contain minor errors which are inherent in voice recognition technology. **      Final report electronically signed by Dr. Adeline Prince on 12/27/2021 6:20 PM      XR CHEST PORTABLE   Final Result   1. Normal heart size. No effusions. No alveolar infiltrates seen. 2. Slightly increased interstitial markings both upper lung fields, consistent with mild interstitial pneumonitis/fibrosis. Ganesh Kennedy **This report has been created using voice recognition software. It may contain minor errors which are inherent in voice recognition technology. **      Final report electronically signed by Dr. Adeline Prince on 12/27/2021 5:38 PM        ECHO Complete 2D W Doppler W Color    Result Date: 12/28/2021  Transthoracic Echocardiography Report (TTE)  Demographics   Patient Name   Vahe Young Gender              Female   MR #           [id number]     Race                                                Ethnicity   Account #      [de-identified]     Room Number         0012   Accession      [id number]    Date of Study       12/28/2021  Number   Date of Birth  1965    Referring Physician Carmina CHANEY   Age            60 year(s)    Sonographer         Rula Mendieta RDCS                                Interpreting        Echo reader of the week Physician           Seven Davison MD  Procedure Type of Study   TTE procedure:ECHOCARDIOGRAM COMPLETE 2D W DOPPLER W COLOR. Procedure Date Date: 12/28/2021 Start: 08:51 AM Study Location: Bedside Technical Quality: Limited visualization due to restricted mobility. Indications:Bradycardia and Hypotension. Additional Medical History:Smoker Patient Status: Routine Height: 65 inches Weight: 105 pounds BSA: 1.5 m^2 BMI: 17.47 kg/m^2 BP: 97/57 mmHg  Conclusions   Summary  Ejection fraction is visually estimated at 50%. There was mild global hypokinesis of the left ventricle. Aortic valve appears tricuspid. Aortic valve leaflets are somewhat thickened. Signature   ----------------------------------------------------------------  Electronically signed by Seven Davison MD (Interpreting  physician) on 12/28/2021 at 06:22 PM  ----------------------------------------------------------------   Findings   Mitral Valve  Mild mitral regurgitation is present. Aortic Valve  Aortic valve appears tricuspid. Aortic valve leaflets are somewhat thickened. Tricuspid Valve  Trivial tricuspid regurgitation visualized. Pulmonic Valve  The pulmonic valve was not well visualized . Trivial pulmonic regurgitation visualized. Left Atrium  Left atrial size was normal.   Left Ventricle  Ejection fraction is visually estimated at 50%. There was mild global hypokinesis of the left ventricle. Right Atrium  Right atrial size was normal.   Right Ventricle  The right ventricular size was normal with normal systolic function and  wall thickness. Pericardial Effusion  The pericardium was normal in appearance with no evidence of a pericardial  effusion. Pleural Effusion  No evidence of pleural effusion. Aorta / Great Vessels  -Aortic root dimension within normal limits.  -The Pulmonary artery is within normal limits. -IVC size is within normal limits with normal respiratory phasic changes.   M-Mode/2D Measurements & Calculations   LV Diastolic       LV Systolic Dimension: 2.9 cm       LA Dimension: 3.3  Dimension: 4.3 cm  LV Volume Diastolic: 68.5 ml        cm  LV FS:32.6 %       LV Volume Systolic: 07.5 ml  LV PW Diastolic:   LV EDV/LV EDV Index: 79.5 ml/53  0.9 cm             m^2LV ESV/LV ESV Index: 24.4 VT/43  Septum Diastolic:  m^2                                 RV Diastolic  0.9 cm             EF Calculated: 69.3 %               Dimension: 2.6 cm  Doppler Measurements & Calculations   MV Peak E-Wave: 84.8 AV Peak Velocity: 123 LVOT Peak Velocity: 77.7 cm/s  cm/s                 cm/s                  LVOT Mean Velocity: 54.9 cm/s  MV Peak A-Wave: 34.7 AV Peak Gradient:     LVOT Peak Gradient: 2 mmHgLVOT  cm/s                 6.05 mmHg             Mean Gradient: 1 mmHg  MV E/A Ratio: 2.44   AV Mean Velocity:  MV Peak Gradient:    90.6 cm/s             TV Peak E-Wave: 60.4 cm/s  2.88 mmHg            AV Mean Gradient: 4   TV Peak A-Wave: 28.7 cm/s                       mmHg  MV Deceleration      AV VTI: 28 cm         TV Peak Gradient: 1.46 mmHg  Time: 194 msec                             TR Velocity:258 cm/s  MV P1/2t: 57 msec                          TR Gradient:26.63 mmHg  MVA by PHT:3.86 cm^2 LVOT VTI: 15.4 cm     PV Peak Velocity: 82.7 cm/s                                             PV Peak Gradient: 2.74 mmHg  MV E' Septal  Velocity: 12.8 cm/s  MV A' Septal         AV DVI (VTI): 0.55AV  Velocity: 4.6 cm/s   DVI (Vmax):0.63  MV E' Lateral  Velocity: 13.7 cm/s  MV A' Lateral  Velocity: 4.8 cm/s  E/E' septal: 6.62  E/E' lateral: 6.19  MR Velocity: 406  cm/s  [email or web address] [id number]    CT HEAD WO CONTRAST    Result Date: 12/27/2021  PROCEDURE: NONCONTRAST CT BRAIN CLINICAL INFORMATION: AMS COMPARISON: No prior study TECHNIQUE: Multiple axial 5 mm images of the brain were obtained without administration of intravenous contrast material. ALL CT SCANS AT THIS FACILITY use dose modulation, iterative reconstruction, and/or weight-based dosing when appropriate to reduce radiation dose to as low as reasonably achievable. FINDINGS: VENTRICLES: Normal in size, contour, position. .. PARENCHYMA:  No acute infarction, mass lesion, or intracranial hemorrhage is seen. MASTOID PROCESSES: Well aerated. Normal in appearance. Abdias Atif PARANASAL SINUSES/CALVARIUM: Unremarkable     No acute intracranial process. . **This report has been created using voice recognition software. It may contain minor errors which are inherent in voice recognition technology. ** Final report electronically signed by Dr. Svetlana Vazquez on 12/27/2021 6:20 PM    XR CHEST PORTABLE    Result Date: 12/28/2021  1 view chest x-ray Comparison: None Findings: Right subclavian line distal tip overlying superior vena cava. Right central line with distal tip likely overlying the right ventricle. Chronic appearing densities of the bilateral lungs without infiltrate. No pneumothorax or pleural effusion. Heart size is normal. No retrocardiac densities. No acute fracture. 1. Right subclavian line distal tip overlying superior vena cava. Right central line with distal tip likely overlying the right ventricle. No pneumothorax. 2. Chronic appearing densities bilaterally without acute cardiopulmonary process. This document has been electronically signed by: Zoraida Scott DO on 12/28/2021 02:42 AM    XR CHEST PORTABLE    Result Date: 12/27/2021  PROCEDURE: XR CHEST PORTABLE CLINICAL INFORMATION: central line placement confirmation COMPARISON: 12/27/2021 TECHNIQUE: A single mobile view of the chest was obtained. 1. Normal heart size. Right subclavian line has been inserted with tip in superior vena cava. No pneumothorax is seen. 2. Findings suggestive of mild interstitial pneumonitis/fibrosis both upper lung fields and possibly also right lung base at this time.  **This report has been created using voice recognition software. It may contain minor errors which are inherent in voice recognition technology. ** Final report electronically signed by Dr. Jayme Navarro on 12/27/2021 11:16 PM    XR CHEST PORTABLE    Result Date: 12/27/2021  PROCEDURE: XR CHEST PORTABLE CLINICAL INFORMATION: SOB COMPARISON: 10/3/2011 TECHNIQUE: A single mobile view of the chest was obtained. 1. Normal heart size. No effusions. No alveolar infiltrates seen. 2. Slightly increased interstitial markings both upper lung fields, consistent with mild interstitial pneumonitis/fibrosis. Waldemar Settler **This report has been created using voice recognition software. It may contain minor errors which are inherent in voice recognition technology. ** Final report electronically signed by Dr. Jayme Navarro on 12/27/2021 5:38 PM    With RN in room, patient was updated about and agreed upon the treatment plan, all the questions and concerns were addressed.     Electronically signed by Kamari Liu MD on 1/1/2022 at 10:16 AM

## 2022-01-01 NOTE — SIGNIFICANT EVENT
Patient transferred to [facility] room 1. Handoff provided to Dr. Cinthia Syed.

## 2022-01-02 LAB
ABSOLUTE RETIC #: 52 THOU/MM3 (ref 20–115)
ANION GAP SERPL CALCULATED.3IONS-SCNC: 13 MEQ/L (ref 8–16)
BLOOD CULTURE, ROUTINE: NORMAL
BLOOD CULTURE, ROUTINE: NORMAL
BUN BLDV-MCNC: 15 MG/DL (ref 7–22)
CALCIUM SERPL-MCNC: 8.9 MG/DL (ref 8.5–10.5)
CHLORIDE BLD-SCNC: 104 MEQ/L (ref 98–111)
CO2: 24 MEQ/L (ref 23–33)
CREAT SERPL-MCNC: 0.8 MG/DL (ref 0.4–1.2)
FOLATE: 11 NG/ML (ref 4.8–24.2)
GFR SERPL CREATININE-BSD FRML MDRD: 73 ML/MIN/1.73M2
GLUCOSE BLD-MCNC: 149 MG/DL (ref 70–108)
IMMATURE RETIC FRACT: 14.7 % (ref 3–15.9)
IRON SATURATION: 36 % (ref 20–50)
IRON: 60 UG/DL (ref 50–170)
MAGNESIUM: 1.3 MG/DL (ref 1.6–2.4)
POTASSIUM SERPL-SCNC: 3.6 MEQ/L (ref 3.5–5.2)
RETIC HEMOGLOBIN: 35.3 PG (ref 28.2–35.7)
RETICULOCYTE ABSOLUTE COUNT: 1.4 % (ref 0.5–2)
SODIUM BLD-SCNC: 141 MEQ/L (ref 135–145)
TOTAL IRON BINDING CAPACITY: 167 UG/DL (ref 171–450)
VITAMIN B-12: 905 PG/ML (ref 211–911)

## 2022-01-02 PROCEDURE — 6360000002 HC RX W HCPCS: Performed by: NURSE PRACTITIONER

## 2022-01-02 PROCEDURE — 80048 BASIC METABOLIC PNL TOTAL CA: CPT

## 2022-01-02 PROCEDURE — 6370000000 HC RX 637 (ALT 250 FOR IP): Performed by: NURSE PRACTITIONER

## 2022-01-02 PROCEDURE — 36415 COLL VENOUS BLD VENIPUNCTURE: CPT

## 2022-01-02 PROCEDURE — 85046 RETICYTE/HGB CONCENTRATE: CPT

## 2022-01-02 PROCEDURE — 83550 IRON BINDING TEST: CPT

## 2022-01-02 PROCEDURE — 99233 SBSQ HOSP IP/OBS HIGH 50: CPT | Performed by: HOSPITALIST

## 2022-01-02 PROCEDURE — 82607 VITAMIN B-12: CPT

## 2022-01-02 PROCEDURE — 82746 ASSAY OF FOLIC ACID SERUM: CPT

## 2022-01-02 PROCEDURE — 83540 ASSAY OF IRON: CPT

## 2022-01-02 PROCEDURE — 6370000000 HC RX 637 (ALT 250 FOR IP): Performed by: STUDENT IN AN ORGANIZED HEALTH CARE EDUCATION/TRAINING PROGRAM

## 2022-01-02 PROCEDURE — 6360000002 HC RX W HCPCS: Performed by: STUDENT IN AN ORGANIZED HEALTH CARE EDUCATION/TRAINING PROGRAM

## 2022-01-02 PROCEDURE — 1200000003 HC TELEMETRY R&B

## 2022-01-02 PROCEDURE — 83735 ASSAY OF MAGNESIUM: CPT

## 2022-01-02 RX ORDER — MAGNESIUM SULFATE IN WATER 40 MG/ML
2000 INJECTION, SOLUTION INTRAVENOUS PRN
Status: DISCONTINUED | OUTPATIENT
Start: 2022-01-02 | End: 2022-01-03 | Stop reason: HOSPADM

## 2022-01-02 RX ADMIN — ACETAMINOPHEN 650 MG: 325 TABLET ORAL at 21:00

## 2022-01-02 RX ADMIN — FLUOXETINE 10 MG: 10 CAPSULE ORAL at 08:18

## 2022-01-02 RX ADMIN — MAGNESIUM SULFATE HEPTAHYDRATE 2000 MG: 2 INJECTION, SOLUTION INTRAVENOUS at 15:18

## 2022-01-02 RX ADMIN — MAGNESIUM SULFATE HEPTAHYDRATE 2000 MG: 2 INJECTION, SOLUTION INTRAVENOUS at 17:16

## 2022-01-02 RX ADMIN — ENOXAPARIN SODIUM 30 MG: 100 INJECTION SUBCUTANEOUS at 08:17

## 2022-01-02 ASSESSMENT — PAIN SCALES - GENERAL
PAINLEVEL_OUTOF10: 0
PAINLEVEL_OUTOF10: 0
PAINLEVEL_OUTOF10: 5

## 2022-01-02 NOTE — FLOWSHEET NOTE
Patient requested her IV be paused while she tried to sleep as she was getting tangled in her line and the fluid infusion was causing her to void more. IV to resume once patient is awake.

## 2022-01-02 NOTE — PROGRESS NOTES
Hospitalist Progress Note      Patient:  Marques Mendez    Unit/Bed:6K-01/001-A  YOB: 1965  MRN: [id number]   Acct: [de-identified]   PCP: ENMANUEL Lozano CNP  Date of Admission: 12/27/2021    Assessment/Plan:    1. Query Symptomatic Bradycardia: Resolved. possibly d/t hypoxia? LHC performed 12/31, no CAD. Transvenous pacer was placed 12/28 and removed 12/31. K/TSH WNLs. Mg ordered. As per cardiology, patient does not require a PPM at this time - plan to discharge patient with cardiac event monitor and follow up with EPS outpatient after one month. 2. Possible acute hypxoic/hypercapneic resp failure?! 2/2 meds? Resolved. 3. Possible Carbon Monoxide Poisoning: Resolved. No inhalational injury/smoke exposure Likely secondary to heavy smoking. CO 13.9 on arrival.  Trended down to 4.1.    4. Query Hx of Primary Hypertension:  No documented home meds; Currently normotensive. Monitor  5. Opioid dependence Disorder:  Suboxone resumed. Also on Fuloxetine; psych following. 6. Query Hx of COPD: No inhalers listed on OP regimen. 7. Active tobacco smoker: counseling and nicotine patch offered. 8. Acute Toxic Encephalopathy: Resolved. W/u non-contributory  9. HyperNa: mild, na 144; likely d/t inadequate free water intake; encourage hydration and trend  10. Chronic macrocytic anemia: Hb stable. Anemia W/U reveals no VIVI. Reticulocytes normal.  11. Malnutrition w/ hypoalbuminemia: placed on nutritional supplemens    Disposition: PT/OT to follow w/ planned discharge w/ Event monitor on 1/3/22.     Chief Complaint: AMS    Initial H and P:-    Chalo Arrow is a 60year old causasian female admitted to [facility] ICU 12/28/2021 s/p TVP placement for symptomatic bradycardia.      Patient has a significant history of current every day smoker, Severe COPD 2011 FEV1=46%, Essential HPTN, Severe Opioid disorder, Mood disorder.      Hever Jaimes [facility] ER 21/85/8320 XIF the evaluation of altered mental status.  History obtained primarily from family at bedside,  and children. Tayo Lu is able to state that she is not having any current pain.  She acknowledges some difficulty with her memory. Family reports that the patient has been in the process of weaning off of her Celexa but did not understand the instructions and weaned off over the course of 2 weeks instead of 4.  She did take a half dose yesterday and today hoping it would help her symptoms.  Family denies any concern for intentional overuse/abuse.  States she smokes 2 packs/day and uses marijuana.  Reports rare alcohol use but she did have 2 drinks on Diamond and feel her symptoms escalated after that. Chitra Maloney has chronic pain which is generalized but she is complained of worsening back pain since a fall last week for which she was seen in another ED. [facility] states she has been sleeping 12 to 15 hours/day and appears to hallucinate.  She will be dazed and have conversations with herself. Chitra Maloney has occasional jerking behaviors without any visualized seizure activity.  States she was prescribed muscle relaxers after her previous fall but did not take these.  She has complained of chills with mild cough without change from her baseline.  Very poor oral intake without vomiting or diarrhea.  Family states she is currently in the process of getting an outpatient work-up for possible lung cancer.  She was seen in the outpatient office today where she was noted to have oxygen saturation in the mid 80s on room air and sent to the ED for further evaluation.     Family identify home monitoring of SaO2 for the past 4-5 days and \"high 80's\".     12/27/2021: While in the ER patient was found to be bradycardic, lethargic and hypotensive. Atropine was given and Dopamine gtt was started. Narcan 0.4 X1 given. An elevated carbonmonoxide level was detected and patient was placed on 100% NRB Mask.  Bradycardia continued despite Dopamine gtt at 7 m/k/m. TVP was placed and patient was admitted to the ICU for further management and care.      12/28/2021: CO level trended down to 4.1. Levophed weaned off. D/w cardiology - will decrease TVP rate to 40 to monitor response.      12/30/2021: Patient became hypotensive with HR in the 40's overnight while pacer was turned down. Cardiology plans for cath tomorrow AM.     12/31/2021: [facility] shows no evidence of CAD. TVP removed. As per Cardiology, patient will need event monitor on discharge and will follow up with Dr. Valerie Mendoza in office in one month. Patient stable for transfer to med/surg. Will need PT/OT, home O2 eval prior to discharge. 1/2/2022: Transferred out of ICU to [place] [id number] on 1/1/2022. Patient overall stable. Awaiting PT OT recommendations. Likely to be discharged on 1/3/2022 with cardiac event monitor. Subjective (past 24 hours):   No acute events overnight. Reports feeling well, has no complaints. Asking when she can go home. Denies any headaches, shortness of breath, chest pain, abdominal pain, N/V/C/D, extremity weakness or pain. Past medical history, family history, social history and allergies reviewed again and is unchanged since admission. ROS (All review of systems completed. Pertinent positives noted.  Otherwise All other systems reviewed and negative.)     Medications:  Reviewed    Infusion Medications    sodium chloride 50 mL/hr (01/02/22 0827)    sodium chloride 50 mL/hr at 12/31/21 0900     Scheduled Medications    magnesium replacement protocol   Other RX Placeholder    FLUoxetine  10 mg Oral Daily    buprenorphine-naloxone  1 Film SubLINGual BID    sodium chloride flush  5-40 mL IntraVENous 2 times per day    enoxaparin  30 mg SubCUTAneous Daily     PRN Meds: nitroGLYCERIN, sodium chloride flush, sodium chloride, ondansetron **OR** ondansetron, polyethylene glycol, acetaminophen **OR** acetaminophen, albuterol      Intake/Output Summary (Last 24 hours) at 1/2/2022 1200  Last data filed at 1/2/2022 0446  Gross per 24 hour   Intake 2338.42 ml   Output --   Net 2338.42 ml       Diet:  ADULT ORAL NUTRITION SUPPLEMENT; Breakfast, Lunch, Dinner; Standard High Calorie/High Protein Oral Supplement  ADULT DIET; Regular    Exam:  BP (!) 143/70   Pulse (!) 46   Temp 98.6 °F (37 °C) (Oral)   Resp 16   Ht 5' 5.5\" (1.664 m)   Wt 111 lb (50.3 kg)   SpO2 94%   BMI 18.19 kg/m²   General appearance: A&O x3, malnourished and unkempt; not ill or toxic, in no apparent distress  HEENT:  Normal cephalic, atraumatic without obvious deformity. Pupils equal, round, and reactive to light. Extra ocular muscles intact. Conjunctivae/corneas clear. Neck: Supple, with full range of motion. No jugular venous distention. Trachea midline. Respiratory:  Normal respiratory effort. NL A/E bilat with no adventitious sounds   Cardiovascular:  normal S1/S2 with no murmurs/gallops  Abdomen: Soft, non-tender, non-distended, no rigidity or peritoneal signs  Musculoskeletal: NL symmetrical A/PROM bilat U/L extremities   Skin: No rashes or lesions. No edema  Neurologic:  CN II-XII intact. NL symmetrical reflexes. NL gait and stance. NL Cerebellar exam. Power 5/5 all muscle groups U/L extremities. Toes downgoing  Psychiatric: Alert and oriented, thought content appropriate, normal insight  Capillary Refill: Brisk,< 3 seconds   Peripheral Pulses: +2 palpable, equal bilaterally     Labs:   Recent Labs     12/31/21 0413   WBC 8.0   HGB 11.8*   HCT 37.2        Recent Labs     12/31/21 0413      K 4.0   *   CO2 23   BUN 20   CREATININE 0.8   CALCIUM 8.5     No results for input(s): AST, ALT, BILIDIR, BILITOT, ALKPHOS in the last 72 hours. Recent Labs     12/31/21 0413   INR 0.99     No results for input(s): [place] in the last 72 hours.     Microbiology:    Blood culture #1:   Lab Results   Component Value Date    BC No growth-preliminary No growth  12/27/2021       Blood culture #2:No results found for: Sharri Si    Organism:No results found for: ORG    No results found for: LABGRAM    MRSA culture only:No results found for: [facility]    Urine culture: No results found for: LABURIN    Respiratory culture: No results found for: CULTRESP    Aerobic and Anaerobic :  No results found for: LABAERO  No results found for: LABANAE    Urinalysis:      Lab Results   Component Value Date    NITRU NEGATIVE 12/27/2021    BLOODU NEGATIVE 12/27/2021    GLUCOSEU NEGATIVE 12/27/2021       Radiology:  XR CHEST PORTABLE   Final Result   1. Right subclavian line distal tip overlying superior vena cava. Right    central line with distal tip likely overlying the right ventricle. No    pneumothorax. 2. Chronic appearing densities bilaterally without acute cardiopulmonary    process. This document has been electronically signed by: Jakob Narvaez DO on    12/28/2021 02:42 AM      XR CHEST PORTABLE   Final Result   1. Normal heart size. Right subclavian line has been inserted with tip in superior vena cava. No pneumothorax is seen. 2. Findings suggestive of mild interstitial pneumonitis/fibrosis both upper lung fields and possibly also right lung base at this time. **This report has been created using voice recognition software. It may contain minor errors which are inherent in voice recognition technology. **      Final report electronically signed by Dr. Salma Hawk on 12/27/2021 11:16 PM      CT HEAD WO CONTRAST   Final Result   No acute intracranial process. .          **This report has been created using voice recognition software. It may contain minor errors which are inherent in voice recognition technology. **      Final report electronically signed by Dr. Salma Hawk on 12/27/2021 6:20 PM      XR CHEST PORTABLE   Final Result   1. Normal heart size. No effusions. No alveolar infiltrates seen.    2. Slightly increased interstitial markings both upper lung fields, consistent with mild interstitial pneumonitis/fibrosis. Marnell Records **This report has been created using voice recognition software. It may contain minor errors which are inherent in voice recognition technology. **      Final report electronically signed by Dr. Lissette Vergara on 12/27/2021 5:38 PM        ECHO Complete 2D W Doppler W Color    Result Date: 12/28/2021  Transthoracic Echocardiography Report (TTE)  Demographics   Patient Name   Carlos Zendejas Gender              Female   MR #           [id number]     Race                                                Ethnicity   Account #      [de-identified]     Room Number         0012   Accession      [id number]    Date of Study       12/28/2021  Number   Date of Birth  1965    Referring Physician Rashaad CHANEY   Age            60 year(s)    Sonographer         Connie Nieves RDCS                                Interpreting        Echo reader of the week                               Physician           Alexa Mary MD  Procedure Type of Study   TTE procedure:ECHOCARDIOGRAM COMPLETE 2D W DOPPLER W COLOR. Procedure Date Date: 12/28/2021 Start: 08:51 AM Study Location: Bedside Technical Quality: Limited visualization due to restricted mobility. Indications:Bradycardia and Hypotension. Additional Medical History:Smoker Patient Status: Routine Height: 65 inches Weight: 105 pounds BSA: 1.5 m^2 BMI: 17.47 kg/m^2 BP: 97/57 mmHg  Conclusions   Summary  Ejection fraction is visually estimated at 50%. There was mild global hypokinesis of the left ventricle. Aortic valve appears tricuspid. Aortic valve leaflets are somewhat thickened.    Signature   ----------------------------------------------------------------  Electronically signed by Alexa Mary MD (Interpreting  physician) on 12/28/2021 at 06:22 PM  ----------------------------------------------------------------   Findings   Mitral Valve  Mild mitral regurgitation is present. Aortic Valve  Aortic valve appears tricuspid. Aortic valve leaflets are somewhat thickened. Tricuspid Valve  Trivial tricuspid regurgitation visualized. Pulmonic Valve  The pulmonic valve was not well visualized . Trivial pulmonic regurgitation visualized. Left Atrium  Left atrial size was normal.   Left Ventricle  Ejection fraction is visually estimated at 50%. There was mild global hypokinesis of the left ventricle. Right Atrium  Right atrial size was normal.   Right Ventricle  The right ventricular size was normal with normal systolic function and  wall thickness. Pericardial Effusion  The pericardium was normal in appearance with no evidence of a pericardial  effusion. Pleural Effusion  No evidence of pleural effusion. Aorta / Great Vessels  -Aortic root dimension within normal limits.  -The Pulmonary artery is within normal limits. -IVC size is within normal limits with normal respiratory phasic changes.   M-Mode/2D Measurements & Calculations   LV Diastolic       LV Systolic Dimension: 2.9 cm       LA Dimension: 3.3  Dimension: 4.3 cm  LV Volume Diastolic: 80.5 ml        cm  LV FS:32.6 %       LV Volume Systolic: 75.1 ml  LV PW Diastolic:   LV EDV/LV EDV Index: 79.5 ml/53  0.9 cm             m^2LV ESV/LV ESV Index: 24.4 DU/97  Septum Diastolic:  m^2                                 RV Diastolic  0.9 cm             EF Calculated: 69.3 %               Dimension: 2.6 cm  Doppler Measurements & Calculations   MV Peak E-Wave: 84.8 AV Peak Velocity: 123 LVOT Peak Velocity: 77.7 cm/s  cm/s                 cm/s                  LVOT Mean Velocity: 54.9 cm/s  MV Peak A-Wave: 34.7 AV Peak Gradient:     LVOT Peak Gradient: 2 mmHgLVOT  cm/s                 6.05 mmHg             Mean Gradient: 1 mmHg  MV E/A Ratio: 2.44   AV Mean Velocity:  MV Peak Gradient: 90.6 cm/s             TV Peak E-Wave: 60.4 cm/s  2.88 mmHg            AV Mean Gradient: 4   TV Peak A-Wave: 28.7 cm/s                       mmHg  MV Deceleration      AV VTI: 28 cm         TV Peak Gradient: 1.46 mmHg  Time: 194 msec                             TR Velocity:258 cm/s  MV P1/2t: 57 msec                          TR Gradient:26.63 mmHg  MVA by PHT:3.86 cm^2 LVOT VTI: 15.4 cm     PV Peak Velocity: 82.7 cm/s                                             PV Peak Gradient: 2.74 mmHg  MV E' Septal  Velocity: 12.8 cm/s  MV A' Septal         AV DVI (VTI): 0.55AV  Velocity: 4.6 cm/s   DVI (Vmax):0.63  MV E' Lateral  Velocity: 13.7 cm/s  MV A' Lateral  Velocity: 4.8 cm/s  E/E' septal: 6.62  E/E' lateral: 6.19  MR Velocity: 406  cm/s  [email or web address] [id number]    CT HEAD WO CONTRAST    Result Date: 12/27/2021  PROCEDURE: NONCONTRAST CT BRAIN CLINICAL INFORMATION: AMS COMPARISON: No prior study TECHNIQUE: Multiple axial 5 mm images of the brain were obtained without administration of intravenous contrast material. ALL CT SCANS AT THIS FACILITY use dose modulation, iterative reconstruction, and/or weight-based dosing when appropriate to reduce radiation dose to as low as reasonably achievable. FINDINGS: VENTRICLES: Normal in size, contour, position. .. PARENCHYMA:  No acute infarction, mass lesion, or intracranial hemorrhage is seen. MASTOID PROCESSES: Well aerated. Normal in appearance. Zackery Punt PARANASAL SINUSES/CALVARIUM: Unremarkable     No acute intracranial process. . **This report has been created using voice recognition software. It may contain minor errors which are inherent in voice recognition technology. ** Final report electronically signed by Dr. Erin Flowers on 12/27/2021 6:20 PM    XR CHEST PORTABLE    Result Date: 12/28/2021  1 view chest x-ray Comparison: None Findings: Right subclavian line distal tip overlying superior vena cava. Right central line with distal tip likely overlying the right ventricle. Chronic appearing densities of the bilateral lungs without infiltrate. No pneumothorax or pleural effusion. Heart size is normal. No retrocardiac densities. No acute fracture. 1. Right subclavian line distal tip overlying superior vena cava. Right central line with distal tip likely overlying the right ventricle. No pneumothorax. 2. Chronic appearing densities bilaterally without acute cardiopulmonary process. This document has been electronically signed by: Pool Jordan DO on 12/28/2021 02:42 AM    XR CHEST PORTABLE    Result Date: 12/27/2021  PROCEDURE: XR CHEST PORTABLE CLINICAL INFORMATION: central line placement confirmation COMPARISON: 12/27/2021 TECHNIQUE: A single mobile view of the chest was obtained. 1. Normal heart size. Right subclavian line has been inserted with tip in superior vena cava. No pneumothorax is seen. 2. Findings suggestive of mild interstitial pneumonitis/fibrosis both upper lung fields and possibly also right lung base at this time. **This report has been created using voice recognition software. It may contain minor errors which are inherent in voice recognition technology. ** Final report electronically signed by Dr. Suzy Dorado on 12/27/2021 11:16 PM    XR CHEST PORTABLE    Result Date: 12/27/2021  PROCEDURE: XR CHEST PORTABLE CLINICAL INFORMATION: SOB COMPARISON: 10/3/2011 TECHNIQUE: A single mobile view of the chest was obtained. 1. Normal heart size. No effusions. No alveolar infiltrates seen. 2. Slightly increased interstitial markings both upper lung fields, consistent with mild interstitial pneumonitis/fibrosis. Bernadette Rued **This report has been created using voice recognition software. It may contain minor errors which are inherent in voice recognition technology. ** Final report electronically signed by Dr. Suzy Dorado on 12/27/2021 5:38 PM    With RN in room,

## 2022-01-03 VITALS
OXYGEN SATURATION: 96 % | WEIGHT: 119.1 LBS | SYSTOLIC BLOOD PRESSURE: 137 MMHG | HEART RATE: 55 BPM | BODY MASS INDEX: 19.14 KG/M2 | DIASTOLIC BLOOD PRESSURE: 69 MMHG | TEMPERATURE: 97.8 F | HEIGHT: 66 IN | RESPIRATION RATE: 16 BRPM

## 2022-01-03 PROCEDURE — 99239 HOSP IP/OBS DSCHRG MGMT >30: CPT | Performed by: HOSPITALIST

## 2022-01-03 PROCEDURE — 97161 PT EVAL LOW COMPLEX 20 MIN: CPT

## 2022-01-03 PROCEDURE — 93270 REMOTE 30 DAY ECG REV/REPORT: CPT

## 2022-01-03 PROCEDURE — 97116 GAIT TRAINING THERAPY: CPT

## 2022-01-03 PROCEDURE — 6360000002 HC RX W HCPCS: Performed by: NURSE PRACTITIONER

## 2022-01-03 PROCEDURE — 6370000000 HC RX 637 (ALT 250 FOR IP): Performed by: STUDENT IN AN ORGANIZED HEALTH CARE EDUCATION/TRAINING PROGRAM

## 2022-01-03 PROCEDURE — 6370000000 HC RX 637 (ALT 250 FOR IP): Performed by: NURSE PRACTITIONER

## 2022-01-03 RX ADMIN — FLUOXETINE 10 MG: 10 CAPSULE ORAL at 08:07

## 2022-01-03 RX ADMIN — ACETAMINOPHEN 650 MG: 325 TABLET ORAL at 04:37

## 2022-01-03 RX ADMIN — ENOXAPARIN SODIUM 30 MG: 100 INJECTION SUBCUTANEOUS at 08:07

## 2022-01-03 RX ADMIN — ACETAMINOPHEN 650 MG: 325 TABLET ORAL at 12:13

## 2022-01-03 ASSESSMENT — PAIN - FUNCTIONAL ASSESSMENT: PAIN_FUNCTIONAL_ASSESSMENT: PREVENTS OR INTERFERES SOME ACTIVE ACTIVITIES AND ADLS

## 2022-01-03 ASSESSMENT — PAIN SCALES - GENERAL
PAINLEVEL_OUTOF10: 8
PAINLEVEL_OUTOF10: 6
PAINLEVEL_OUTOF10: 7
PAINLEVEL_OUTOF10: 6

## 2022-01-03 ASSESSMENT — PAIN DESCRIPTION - FREQUENCY: FREQUENCY: CONTINUOUS

## 2022-01-03 ASSESSMENT — PAIN DESCRIPTION - PROGRESSION: CLINICAL_PROGRESSION: NOT CHANGED

## 2022-01-03 ASSESSMENT — PAIN DESCRIPTION - ONSET: ONSET: ON-GOING

## 2022-01-03 ASSESSMENT — PAIN SCALES - WONG BAKER: WONGBAKER_NUMERICALRESPONSE: 6

## 2022-01-03 NOTE — PROGRESS NOTES
Discharge teaching and instructions for diagnosis/procedure of bradycardia completed with patient using teachback method. AVS reviewed. Printed prescriptions given to patient. Patient voiced understanding regarding prescriptions, follow up appointments, and care of self at home. Discharged in a wheelchair to  home with support per  in stable condition.

## 2022-01-03 NOTE — PROGRESS NOTES
Yayo Velázquez 58  INPATIENT OCCUPATIONAL THERAPY  [facility] RENAL TELEMETRY 6K  EVALUATION    Time:   Time In: 9061  Time Out: 1215  Timed Code Treatment Minutes: 0 Minutes  Minutes: 10          Date: 1/3/2022  Patient Name: Lisa Evans,   Gender: female      MRN: [id number]  : 1965  (60 y.o.)  Referring Practitioner: Dr. Jm Owens MD  Diagnosis: Anorexia  Additional Pertinent Hx: Pt presented to [facility] ER 2021 for the evaluation of altered mental status. History obtained primarily from family at bedside,  and children. Patient is able to state that she is not having any current pain. She acknowledges some difficulty with her memory. Family reports that the patient has been in the process of weaning off of her Celexa but did not understand the instructions and weaned off over the course of 2 weeks instead of 4. She did take a half dose yesterday and today hoping it would help her symptoms. Family denies any concern for intentional overuse/abuse. States she smokes 2 packs/day and uses marijuana. Reports rare alcohol use but she did have 2 drinks on Diamond and feel her symptoms escalated after that. She has chronic pain which is generalized but she is complained of worsening back pain since a fall last week for which she was seen in another ED. He states she has been sleeping 12 to 15 hours/day and appears to hallucinate. She will be dazed and have conversations with herself. She has occasional jerking behaviors without any visualized seizure activity. States she was prescribed muscle relaxers after her previous fall but did not take these. She has complained of chills with mild cough without change from her baseline. Very poor oral intake without vomiting or diarrhea. Family states she is currently in the process of getting an outpatient work-up for possible lung cancer.   She was seen in the outpatient office today where she was noted to have oxygen saturation in the mid 80s on room air and sent to the ED for further evaluation. Restrictions/Precautions:  Restrictions/Precautions: General Precautions,Fall Risk    Subjective  Chart Reviewed: Khai Wild and Physical,Other (comment) (PT evaluation)  Patient assessed for rehabilitation services?: Yes    Subjective: Cooperative. Pt asked when she can have the telemetry monitor removed or the other thing sticking to the skin of her upper chest.  She asked for tylenol. Comments: RN approved session. Pt asked to not do any activity at this time secondary to having pain. She was unable to specify how much or where. Her nurse was notified that she had requested a tylenol. Pain:  Pain Assessment  Patient Currently in Pain: Yes  Pain Assessment: Faces  Pena-Baker Pain Rating: Hurts even more  Pain Level: 6  Pain Frequency: Continuous  Clinical Progression: Not changed  Patient's Stated Pain Goal: No pain  Response to Pain Intervention: Patient Satisfied  Multiple Pain Sites: No    Vitals: Vitals not assessed per clinical judgement, see nursing flowsheet    Social/Functional History:  Lives With: Spouse  Type of Home: House  Home Layout: One level  Home Access: Stairs to enter with rails  Entrance Stairs - Number of Steps: 3  Home Equipment: Rolling walker   Bathroom Toilet: Standard  Bathroom Accessibility: Accessible    Receives Help From: Family  ADL Assistance: Independent  Homemaking Assistance: Needs assistance  Homemaking Responsibilities: No  Ambulation Assistance: Independent  Transfer Assistance: Independent    Active : Yes  Occupation: Unemployed  Additional Comments: Pt has been sleeping for long periods of time during the day, per family. She continues to smoke and goes out to the garage to do so, per  note.   Pt is independent with her self care and ambulates without any AD.    VISION:WFL    HEARING:  WFL    COGNITION: Decreased Insight    RANGE OF MOTION:  Bilateral Upper Extremity: WFL    STRENGTH:  Bilateral Upper Extremity:  Not Tested    SENSATION:   WFL    ADL:   No ADL's completed this session. Ganesh Kennedy BALANCE:  Sitting Balance:  Stand By Assistance. sitting up while having lunch set in front of her with the bedside table- head of bed was also elevated for pt    BED MOBILITY:  Rolling to Left: Supervision, with head of bed raised, with rail      TRANSFERS:  Not demonstrated at this time. Activity Tolerance:  Patient tolerance of  treatment: Not applicable. Pt did not get out of bed or demonstrate strength or do any exercising at this time. Assessment:  Assessment: Pt is admitted with anorexia and hypoxia. She had bradycardia also this admission. She has hx of ETOH abuse and 2 pk/day of cigarette smoking. She has tried unsuccessfully in the past to quit smoking. Per PT, pt demonstrates walking at close to her baseline level. Pt engaged in conversation about her nutrition and also about her hx of attempting to quit smoking. Other activities were not attempted at this time secondary to pain. Pt's family expressed concerns about pt's safety to herself when going home. Concerns were brought to attention of SW.    Performance deficits / Impairments: Decreased endurance  Prognosis: Fair  REQUIRES OT FOLLOW UP: No  No Skilled OT: Independent with ADL's  Decision Making: Low Complexity    Treatment Initiated: Pt was provided a handout with information regarding a smoking cessation group locally. Pt lives 30 [place] drive away, however. Encouraged pt to ask the help of those closest to her in meeting her goal of quitting smoking. Family present at this time and was supportive. Reminded pt that having a relapse is nothing to worry about and that she should focus on her goal and keep working at it. Discharge Recommendations:   Home with help from family. No follow up OT recommended. Outpatient counseling was being recommended by the SW.     Patient Education:  OT Education: OT Role,Family Education  Patient Education: Alternate sources of nutrition including supplement drinks; suggestions on ways to get protein into her diet; importance of having social support when attempting to quit smoking or other addictions to help increase chances of success. Equipment Recommendations:  Equipment Needed: No    Plan:  Times per week: Not applicable  Plan Comment: Pt was referred to a quit smoking group as an outpatient. Pt indicated that she was wanting to be discharged. Pt's family was indicating that she is not ready for discharge and still has many unresolved issues. Discussed their concerns with . Specific instructions for Next Treatment: Not applicable. See long-term goal time frame for expected duration of plan of care. If no long-term goals established, a short length of stay is anticipated. Goals:  Patient goals : \"Go home. \" pt indicates. Not applicable. No short term goals secondary to pt being discharged. Following session, patient left in safe position with all fall risk precautions in place.

## 2022-01-03 NOTE — PROCEDURES
30 Day Event Monitor was applied to patient. Instructions were given and skin/monitor prep and application was demonstrated. Patient was instructed to remove monitor on 02/02/2022 and mail back to [employer].

## 2022-01-03 NOTE — PROGRESS NOTES
[id number] [id number]  INPATIENT PHYSICAL THERAPY  EVALUATION  [facility] RENAL TELEMETRY 6K - 6K-01/001-A    Time In: 2615  Time Out: 1049  Timed Code Treatment Minutes: 8 Minutes  Minutes: 14          Date: 1/3/2022  Patient Name: Lisa Evans,  Gender:  female        MRN: [id number]  : 1965  (60 y.o.)      Referring Practitioner: Sanket Samayoa MD  Diagnosis: Anorexia  Additional Pertinent Hx: Janet Vieira is a 60year old causasian female admitted to [facility] ICU 2021 s/p TVP placement for symptomatic bradycardia. Patient has a significant history of current every day smoker, Severe COPD  FEV1=46%, Essential HPTN, Severe Opioid disorder, Mood disorder. Vivek Workman presented to [facility] ER 2021 for the evaluation of altered mental status. History obtained primarily from family at bedside,  and children. Patient is able to state that she is not having any current pain. She acknowledges some difficulty with her memory. Family reports that the patient has been in the process of weaning off of her Celexa but did not understand the instructions and weaned off over the course of 2 weeks instead of 4. She did take a half dose yesterday and today hoping it would help her symptoms. Family denies any concern for intentional overuse/abuse. States she smokes 2 packs/day and uses marijuana. Reports rare alcohol use but she did have 2 drinks on Diamond and feel her symptoms escalated after that. She has chronic pain which is generalized but she is complained of worsening back pain since a fall last week for which she was seen in another ED. He states she has been sleeping 12 to 15 hours/day and appears to hallucinate. She will be dazed and have conversations with herself. She has occasional jerking behaviors without any visualized seizure activity. States she was prescribed muscle relaxers after her previous fall but did not take these.   She has complained of chills with mild cough without change from her baseline. Very poor oral intake without vomiting or diarrhea. Family states she is currently in the process of getting an outpatient work-up for possible lung cancer. She was seen in the outpatient office today where she was noted to have oxygen saturation in the mid 80s on room air and sent to the ED for further evaluation. Restrictions/Precautions:  Restrictions/Precautions: General Precautions,Fall Risk    Subjective:  Chart Reviewed: Yes  Patient assessed for rehabilitation services?: Yes  Subjective: RN approved session. Pt pleasant and agreeable to therpay. Family present and voices no concerns    General:  Overall Orientation Status: Within Functional Limits  Follows Commands: Within Functional Limits    Vision: Impaired  Vision Exceptions: Wears glasses at all times    Hearing: Within functional limits         Pain: 0/10: denies pain     Vitals: Vitals not assessed per clinical judgement, see nursing flowsheet    Social/Functional History:    Lives With: Spouse  Type of Home: House  Home Layout: One level  Home Access: Stairs to enter with rails  Entrance Stairs - Number of Steps: 3  Home Equipment: Rolling walker     Bathroom Toilet: Standard  Bathroom Accessibility: Accessible    Receives Help From: Family  ADL Assistance: Independent  Homemaking Assistance: Needs assistance  Homemaking Responsibilities: No  Ambulation Assistance: Independent  Transfer Assistance: Independent    Active : Yes  Occupation: Unemployed  Additional Comments: Pt has been sleeping for long periods of time during the day, per family. She continues to smoke and goes out to the garage to do so, per  note. Pt is independent with her self care and ambulates without any AD.     OBJECTIVE:  Range of Motion:  Bilateral Lower Extremity: WFL    Strength:  Bilateral Lower Extremity: WFL    Balance:  Static Sitting Balance:  Modified Independent  Static Standing Balance: Supervision    Bed Mobility:  Supine to Sit: Modified Independent  Sit to Supine: Modified Independent     Transfers:  Sit to Stand: Supervision  Stand to Sit:Supervision    Ambulation:  Supervision  Distance: 250'   Surface: Level Tile  Device:No Device  Gait Deviations:  Decreased Gait Speed    Functional Outcome Measures: Completed  AM-PAC Inpatient Mobility Raw Score : 22  AM-PAC Inpatient T-Scale Score : 53.28    ASSESSMENT:  Activity Tolerance:  Patient tolerance of  treatment: good. Pt with no c/o SOB, no significant LOB noted. Treatment Initiated: Treatment and education initiated within context of evaluation. Evaluation time included review of current medical information, gathering information related to past medical, social and functional history, completion of standardized testing, formal and informal observation of tasks, assessment of data and development of plan of care and goals. Treatment time included skilled education and facilitation of tasks to increase safety and independence with functional mobility for improved independence and quality of life. Assessment:  Assessment: Pt is presenting at/near [facility] and does not require any further skilled acute therapy services. Prognosis: Good    REQUIRES PT FOLLOW UP: No  No Skilled PT: Safe to return home    Discharge Recommendations:  Discharge Recommendations: Home with assist PRN    Patient Education:  PT Education: PT Zohreh Simental of Care,Functional Mobility Training    Equipment Recommendations:  Equipment Needed: No    Plan:  Times per week: NA    Goals:  Patient goals : None stated  Short term goals  Time Frame for Short term goals: NA       Following session, patient left in safe position with all fall risk precautions in place.     Katt Sethi PT, DPT

## 2022-01-03 NOTE — CARE COORDINATION
1/3/22, 11:45 AM EST    DISCHARGE PLANNING EVALUATION    SW order received \" concerns for safety at home\". SW met with pt and multiple family members. Pt resides at home with family. Pt is independent in all area's of ADL's. Daughter questioned pts \"safety at home\" due to pt smoking in the garage with the heater on and door closed. SW reiterated what the physician had discussed with pt. SW offered [facility]. Pt and family declined. 1/3/22, 11:48 AM EST    Patient goals/plan/ treatment preferences discussed by  and . Patient goals/plan/ treatment preferences reviewed with patient/ family. Patient/ family verbalize understanding of discharge plan and are in agreement with goal/plan/treatment preferences. Understanding was demonstrated using the teach back method. AVS provided by RN at time of discharge, which includes all necessary medical information pertaining to the patients current course of illness, treatment, post-discharge goals of care, and treatment preferences. Pt is discharging home with family today, deny any needs.

## 2022-01-03 NOTE — DISCHARGE SUMMARY
Hospital Medicine Discharge Summary      Patient Identification:   Karina Amaro  : 1965  MRN: [id number]   Account: [de-identified]      Patient's PCP: ENMANUEL Cameron CNP    Admit Date: 2021     Discharge Date: 1/3/2022    Admitting Physician: Job Lamas MD     Discharge Physician: Avril Soriano MD     Hospital Course:   Bishnu Fields is a 60year old causasian female admitted to [facility] ICU 2021 s/p TVP placement for symptomatic bradycardia.      Patient has a significant history of current every day smoker, Severe COPD  FEV1=46%, Essential HPTN, Severe Opioid disorder, Mood disorder.      Mirza December [facility] ER  LVW the evaluation of altered mental status.  History obtained primarily from family at bedside,  and children. Tequila Roy is able to state that she is not having any current pain.  She acknowledges some difficulty with her memory.   Family reports that the patient has been in the process of weaning off of her Celexa but did not understand the instructions and weaned off over the course of 2 weeks instead of 4.  She did take a half dose yesterday and today hoping it would help her symptoms.  Family denies any concern for intentional overuse/abuse.  States she smokes 2 packs/day and uses marijuana.  Reports rare alcohol use but she did have 2 drinks on  and feel her symptoms escalated after that. Anna Carr has chronic pain which is generalized but she is complained of worsening back pain since a fall last week for which she was seen in another ED. Sarabjit Krishna states she has been sleeping 12 to 15 hours/day and appears to hallucinate.  She will be dazed and have conversations with herself. Anna Carr has occasional jerking behaviors without any visualized seizure activity.  States she was prescribed muscle relaxers after her previous fall but did not take these.  She has complained of chills with mild cough without change from her baseline.  Very poor oral intake without vomiting or diarrhea.  Family states she is currently in the process of getting an outpatient work-up for possible lung cancer.  She was seen in the outpatient office today where she was noted to have oxygen saturation in the mid 80s on room air and sent to the ED for further evaluation.     Family identify home monitoring of SaO2 for the past 4-5 days and \"high 80's\".     12/27/2021: While in the ER patient was found to be bradycardic, lethargic and hypotensive. Atropine was given and Dopamine gtt was started. Narcan 0.4 X1 given. An elevated carbonmonoxide level was detected and patient was placed on 100% NRB Mask. Bradycardia continued despite Dopamine gtt at 7 m/k/m. TVP was placed and patient was admitted to the ICU for further management and care.      12/28/2021: CO level trended down to 4.1. Levophed weaned off. D/w cardiology - will decrease TVP rate to 40 to monitor response.      12/30/2021: Patient became hypotensive with HR in the 40's overnight while pacer was turned down. Cardiology plans for cath tomorrow AM.     12/31/2021: LHC shows no evidence of CAD. TVP removed. As per Cardiology, patient will need event monitor on discharge and will follow up with Dr. Zechariah Darden in office in one month. Patient stable for transfer to med/surg. Will need PT/OT, home O2 eval prior to discharge.     1/2/2022: Transferred out of ICU to [place] [id number] on 1/1/2022. Patient overall stable. Awaiting PT OT recommendations. Likely to be discharged on 1/3/2022 with cardiac event monitor. 1/3/2022: The patient was stable for discharge - all consultants were contacted and in agreement with plan for discharge. Appropriate follow up appointment was arranged prior to discharge. Please see below or view chart for more details from hospital course. Discharge Diagnoses:    1.  Query Symptomatic Bradycardia: Resolved. possibly d/t hypoxia? LHC performed 12/31, no CAD.  Transvenous pacer was placed 12/28 and removed 12/31. K/TSH WNLs. Mg ordered. As per cardiology, patient does not require a PPM at this time - plan to discharge patient with cardiac event monitor and follow up with EPS outpatient after one month. 2. Possible acute hypxoic/hypercapneic resp failure?! 2/2 meds? Resolved. 3. Possible Carbon Monoxide Poisoning: Resolved. No inhalational injury/smoke exposure Likely secondary to heavy smoking. CO 13.9 on arrival.  Trended down to 4. 1.    4. Query Hx of Primary Hypertension:  No documented home meds; Currently normotensive. Monitor  5. Opioid dependence Disorder:  Suboxone resumed. Also on Fuloxetine; psych following. 6. Query Hx of COPD: No inhalers listed on OP regimen. 7. Active tobacco smoker: counseling and nicotine patch offered. 8. Acute Toxic Encephalopathy: Resolved. W/u non-contributory  9. HyperNa: mild, na 144; likely d/t inadequate free water intake; encourage hydration and trend  10. Chronic macrocytic anemia: Hb stable. Anemia W/U reveals no VIVI. Reticulocytes normal.  11. Malnutrition w/ hypoalbuminemia: placed on nutritional supplemens    Please view chart for detailed course and treatment of above hospital diagnoses. The patient was seen and examined on day of discharge and this discharge summary is in conjunction with any daily progress note from day of discharge. Exam:     Vitals:  Vitals:    01/02/22 2321 01/03/22 0318 01/03/22 0756 01/03/22 1200   BP: (!) 111/59 126/67 (!) 144/71 137/69   Pulse: (!) 48 (!) 45 51 55   Resp: 16 16 16 16   Temp: 98.2 °F (36.8 °C) 98.4 °F (36.9 °C) 97.8 °F (36.6 °C) 97.8 °F (36.6 °C)   TempSrc: Oral Oral Oral Oral   SpO2: 95% 94% 95% 96%   Weight:  119 lb 1.6 oz (54 kg)     Height:         Weight: Weight: 119 lb 1.6 oz (54 kg)     24 hour intake/output:  No intake or output data in the 24 hours ending 01/04/22 2117    Constitutional: In no acute distress. Patient is oriented to person, place, and time. HENT:   Head: Normocephalic and atraumatic. Mouth/Throat: Oropharynx is clear and moist.  No oral thrush. Eyes: Conjunctivae are normal. Pupils are equal, round, and reactive to light. No scleral icterus. Neck: Neck supple. No JVD present. No tracheal deviation present. Cardiovascular: Normal rate, regular rhythm, normal heart sounds. No murmur heard. Pulmonary/Chest: Effort normal and breath sounds normal. No stridor. No respiratory distress. No wheezes. No rales. Patient exhibits no tenderness. Abdominal: Soft. Patient exhibits no distension. No tenderness. Bowel sounds present. Musculoskeletal: Normal range of motion. Extremities: Patient exhibits no edema and no tenderness. Lymphadenopathy: No cervical adenopathy. Neurological: Patient is alert and oriented to person, place, and time. Skin: Skin is warm and dry. Patient is not diaphoretic. No rashes or lesions. Psychiatric: Patient has a normal mood and affect. Patient behavior is normal.      Labs: For convenience and continuity at follow-up the following most recent labs are provided:    CBC:    Lab Results   Component Value Date    WBC 8.0 12/31/2021    HGB 11.8 12/31/2021    HCT 37.2 12/31/2021     12/31/2021       Renal:    Lab Results   Component Value Date     01/02/2022    K 3.6 01/02/2022    K 4.0 12/31/2021     01/02/2022    CO2 24 01/02/2022    BUN 15 01/02/2022    CREATININE 0.8 01/02/2022    CALCIUM 8.9 01/02/2022     Significant Diagnostic Studies    Radiology:   XR CHEST PORTABLE   Final Result   1. Right subclavian line distal tip overlying superior vena cava. Right    central line with distal tip likely overlying the right ventricle. No    pneumothorax. 2. Chronic appearing densities bilaterally without acute cardiopulmonary    process. This document has been electronically signed by: Zoraida Scott DO on    12/28/2021 02:42 AM      XR CHEST PORTABLE   Final Result   1. Normal heart size.  Right subclavian line has been inserted with tip in superior vena cava. No pneumothorax is seen. 2. Findings suggestive of mild interstitial pneumonitis/fibrosis both upper lung fields and possibly also right lung base at this time. **This report has been created using voice recognition software. It may contain minor errors which are inherent in voice recognition technology. **      Final report electronically signed by Dr. Renan Alberts on 12/27/2021 11:16 PM      CT HEAD WO CONTRAST   Final Result   No acute intracranial process. .          **This report has been created using voice recognition software. It may contain minor errors which are inherent in voice recognition technology. **      Final report electronically signed by Dr. Renan Alberts on 12/27/2021 6:20 PM      XR CHEST PORTABLE   Final Result   1. Normal heart size. No effusions. No alveolar infiltrates seen. 2. Slightly increased interstitial markings both upper lung fields, consistent with mild interstitial pneumonitis/fibrosis. Zofia Spears **This report has been created using voice recognition software. It may contain minor errors which are inherent in voice recognition technology. **      Final report electronically signed by Dr. Renan Alberts on 12/27/2021 5:38 PM        Consults:     STR ED TO IP CONSULT  IP CONSULT TO DIETITIAN  IP CONSULT TO PSYCHIATRY  IP CONSULT TO SOCIAL WORK     Disposition:    [x] Home       [] TCU       [] Rehab       [] Psych       [] SNF       [] [place]       [] Other-    Condition at Discharge: Stable    Code Status:  Prior     Patient Instructions:    Discharge lab work: None  Activity: activity as tolerated  Diet: No diet orders on file      Follow-up visits:   ENMANUEL Rosenberg - CNP  [address] [place] [address]    [place] on 1/12/2022  @ 1:40pm for hospital follow-up     Discharge Medications:        Medication List      CONTINUE taking these medications    FLUoxetine 10 MG capsule  Commonly known as: PROZAC  Take 1 capsule by mouth daily     gabapentin 300 MG capsule  Commonly known as: NEURONTIN     naloxone 4 MG/0.1ML Liqd nasal spray  1 spray by Nasal route as needed for Opioid Reversal     ondansetron 4 MG tablet  Commonly known as: ZOFRAN  TAKE 1 TABLET BY MOUTH THREE TIMES DAILY AS NEEDED FOR NAUSEA OR  VOMITING     triamterene-hydroCHLOROthiazide 75-50 MG per tablet  Commonly known as: MAXZIDE        STOP taking these medications    buprenorphine-naloxone 8-2 MG Film SL film  Commonly known as: SUBOXONE     citalopram 40 MG tablet  Commonly known as: CELEXA     cyclobenzaprine 10 MG tablet  Commonly known as: FLEXERIL          Time Spent on discharge is roughly > 30 minutes in the examination, evaluation, counseling and review of medications and discharge plan. Thank you ENMANUEL Rosenberg - HERACLIO for the opportunity to be involved in this patient's care.     Signed:    Electronically signed by Sreedhar Mendiola MD on 1/4/2022 at 9:17 PM

## 2022-01-04 DIAGNOSIS — F11.20 SEVERE OPIOID USE DISORDER (HCC): ICD-10-CM

## 2022-01-04 NOTE — ADT AUTH CERT
Cardiology [address] [facility] Day 7 (1/3/2022) by Karin Tesfaye RN       Review Status Review Entered   Completed 1/4/2022 11:23      Criteria Review      Care Day: 7 Care Date: 1/3/2022 Level of Care: ICU    Guideline Day 2    Level Of Care    (X) Floor    Clinical Status    (X) * No ICU or intermediate care needs    (X) Pulmonary and peripheral edema absent or improved    (X) Arrhythmias absent or improved    Interventions    (X) Inpatient interventions continue    (X) Transition to oral routes    * Milestone   Additional Notes   DATE: 1/3/22         Pertinent Updates:   Patient discharged home       Vitals: T 98.4 P 55 r 16 bp 144/71 spo2 96%         Medications:   LOVENOX) injection 30 mg sq qd   FLUoxetine (PROZAC) capsule 10 mg po qd   acetaminophen (TYLENOL) tablet 650 mg   Dose: 650 mg   Freq: EVERY 6 HOURS PRN Route: PO x2               PT/OT/SLP/CM Assessments or Notes:   Per PT         Assessment:   Assessment: Pt is presenting at/near PLOF and does not require any further skilled acute therapy services. Prognosis: Good       REQUIRES PT FOLLOW UP: No   No Skilled PT: Safe to return home                  Cardiology [address] Day 5 (1/1/2022) by Karin Tesfaye RN       Review Status Review Entered   Completed 1/4/2022 11:17      Criteria Review      Care Day: 5 Care Date: 1/1/2022 Level of Care: ICU    Guideline Day 2    Level Of Care    (X) Floor    1/4/2022 11:17 AM EST by Marie Bunn      Pt transferred to [facility] overnight    Clinical Status    ( ) * No ICU or intermediate care needs    Interventions    (X) Inpatient interventions continue    1/4/2022 11:17 AM EST by Marie Bunn      PT/OT    psych following    * Milestone   Additional Notes   DATE: 1/1/22      [facility] shows no evidence of CAD. TVP removed. As per Cardiology, patient will need event monitor on discharge and will follow up with Dr. Charlene Hickey in office in one month. Patient stable for transfer to med/surg.  Will need PT/OT, home O2 eval prior to discharge. Pertinent Updates:   disengaged in conversation      Vitals:   BP (!) 147/72   Pulse (!) 48   Temp 98.1 °F (36.7 °C) (Oral)   Resp 12    SpO2 96%       Abnl/Pertinent Labs/Radiology/Diagnostic Studies:   EKG   Sinus bradycardia   Low voltage QRS, consider pulmonary disease, pericardial effusion, or normal variant   Cannot rule out Anteroseptal infarct , age undetermined   Abnormal ECG   When compared with ECG of 27-DEC-2021 16:58,   Minimal criteria for Anteroseptal infarct are now Present      Medications:   FLUoxetine 10 mg Oral Daily    · buprenorphine-naloxone 1 Film SubLINGual BID      · enoxaparin 30 mg SubCUTAneous Daily   albuterol (PROVENTIL) nebulizer solution 2.5 mg   Dose: 2.5 mg   Freq: EVERY 4 HOURS PRN x1          MD Consults/Assessments & Plans:   Per attending   Assessment/Plan:       Query Symptomatic Bradycardia: Resolved. possibly d/t hypoxia? LHC performed 12/31, no CAD.  Transvenous pacer was placed 12/28 and removed 12/31. K/TSH WNLs. Mg ordered. As per cardiology, patient does not require a PPM at this time - plan to discharge patient with cardiac event monitor and follow up with EPS outpatient after one month. Possible acute hypxoic/hyperfxapneic resp failure?! 2/2 meds? Resolved. Possible Carbon Monoxide Poisoning: Resolved. No inhalational injury/smoke exposure Likely secondary to heavy smoking. CO 13.9 on arrival.  Trended down to 4. 1.     Query Hx of Primary Hypertension:  No documented home meds; Currently normotensive. Monitor    Opioid dependence Disorder:  Suboxone resumed. Also on Fuloxetine; psych following. Query Hx of COPD: No inhalers listed on OP regimen. Active tobacco smoker: counseling and nicotine patch offered. Acute Toxic Encephalopathy: Resolved. W/u non-contributory   HyperNa: mild, na 144; likely d/t inadequate free water intake; encourage hydration and trend   Chronic macrocytic anemia: Hb stable. Anemia W/U sent. Malnutrition w/ hypoalbuminemia: placed on nutritional supplements       Disposition: PT/OT to follow w/ planned discharge w/ Event monitor        PT/OT/SLP/CM Assessments or Notes:

## 2022-01-04 NOTE — TELEPHONE ENCOUNTER
Last visit- 12/14/2021  Next visit- 1/12/2022    Requested Prescriptions     Pending Prescriptions Disp Refills    buprenorphine-naloxone (SUBOXONE) 8-2 MG FILM SL film 14 Film 0     Sig: Place 1 Film under the tongue 2 times daily for 7 days.

## 2022-01-05 ENCOUNTER — HOSPITAL ENCOUNTER (OUTPATIENT)
Dept: PULMONOLOGY | Age: 57
Discharge: HOME | End: 2022-01-05
Payer: COMMERCIAL

## 2022-01-05 ENCOUNTER — HOSPITAL ENCOUNTER (OUTPATIENT)
Dept: CT IMAGING | Age: 57
Discharge: HOME | End: 2022-01-05
Payer: COMMERCIAL

## 2022-01-05 DIAGNOSIS — Z87.891 PERSONAL HISTORY OF TOBACCO USE: ICD-10-CM

## 2022-01-05 PROCEDURE — 94729 DIFFUSING CAPACITY: CPT

## 2022-01-05 PROCEDURE — 94060 EVALUATION OF WHEEZING: CPT

## 2022-01-05 PROCEDURE — 71271 CT THORAX LUNG CANCER SCR C-: CPT

## 2022-01-05 PROCEDURE — 94726 PLETHYSMOGRAPHY LUNG VOLUMES: CPT

## 2022-01-05 RX ORDER — BUPRENORPHINE AND NALOXONE 8; 2 MG/1; MG/1
1 FILM, SOLUBLE BUCCAL; SUBLINGUAL 2 TIMES DAILY
Qty: 14 FILM | Refills: 0 | Status: SHIPPED | OUTPATIENT
Start: 2022-01-05 | End: 2022-01-11 | Stop reason: SDUPTHER

## 2022-01-07 ENCOUNTER — TELEPHONE (OUTPATIENT)
Dept: INTERNAL MEDICINE CLINIC | Age: 57
End: 2022-01-07

## 2022-01-07 DIAGNOSIS — J44.9 CHRONIC OBSTRUCTIVE PULMONARY DISEASE, UNSPECIFIED COPD TYPE (HCC): Primary | ICD-10-CM

## 2022-01-07 NOTE — TELEPHONE ENCOUNTER
----- Message from ENMANUEL Tyson CNP sent at 1/5/2022  9:17 AM EST -----  CT chest reviewed. There are some nodules, but they are small. Recommend repeat in 6 months.

## 2022-01-07 NOTE — TELEPHONE ENCOUNTER
----- Message from ENMANUEL Mosquera CNP sent at 1/6/2022  8:41 AM EST -----  PFTs revealed significant COPD. Refer to pulmonary.

## 2022-01-11 ENCOUNTER — OFFICE VISIT (OUTPATIENT)
Dept: INTERNAL MEDICINE CLINIC | Age: 57
End: 2022-01-11
Payer: COMMERCIAL

## 2022-01-11 VITALS
HEIGHT: 66 IN | WEIGHT: 97.2 LBS | TEMPERATURE: 97.1 F | HEART RATE: 87 BPM | DIASTOLIC BLOOD PRESSURE: 93 MMHG | SYSTOLIC BLOOD PRESSURE: 147 MMHG | BODY MASS INDEX: 15.62 KG/M2 | RESPIRATION RATE: 20 BRPM

## 2022-01-11 DIAGNOSIS — J44.9 CHRONIC OBSTRUCTIVE PULMONARY DISEASE, UNSPECIFIED COPD TYPE (HCC): ICD-10-CM

## 2022-01-11 DIAGNOSIS — S81.802A WOUND OF LEFT LOWER EXTREMITY, INITIAL ENCOUNTER: ICD-10-CM

## 2022-01-11 DIAGNOSIS — F11.20 SEVERE OPIOID USE DISORDER (HCC): Primary | ICD-10-CM

## 2022-01-11 DIAGNOSIS — Z09 HOSPITAL DISCHARGE FOLLOW-UP: ICD-10-CM

## 2022-01-11 DIAGNOSIS — R00.1 BRADYCARDIA: ICD-10-CM

## 2022-01-11 PROCEDURE — 99214 OFFICE O/P EST MOD 30 MIN: CPT | Performed by: NURSE PRACTITIONER

## 2022-01-11 PROCEDURE — 80305 DRUG TEST PRSMV DIR OPT OBS: CPT | Performed by: NURSE PRACTITIONER

## 2022-01-11 RX ORDER — BUPRENORPHINE AND NALOXONE 8; 2 MG/1; MG/1
1 FILM, SOLUBLE BUCCAL; SUBLINGUAL 2 TIMES DAILY
Qty: 28 FILM | Refills: 0 | Status: SHIPPED | OUTPATIENT
Start: 2022-01-11 | End: 2022-01-25 | Stop reason: SDUPTHER

## 2022-01-11 NOTE — PROGRESS NOTES
Post-Discharge Transitional Care Management Services or Hospital Follow Up      Mihaela Robledo   YOB: 1965    Date of Office Visit:  1/11/2022  Date of Hospital Admission: 12/27/21  Date of Hospital Discharge: 1/3/22  Risk of hospital readmission (high >=14%. Medium >=10%) :Readmission Risk Score: 8.2 ( )      Care management risk score Rising risk (score 2-5) and Complex Care (Scores >=6): 0     Non face to face  following discharge, date last encounter closed (first attempt may have been earlier): *No documented post hospital discharge outreach found in the last 14 days    Call initiated 2 business days of discharge: *No response recorded in the last 14 days    Patient Active Problem List   Diagnosis    Symptomatic bradycardia    Toxic effect of carbon monoxide, unintentional    Altered mental status    Severe malnutrition ([facility] [address]     No Known Allergies    Medications listed as ordered at the time of discharge from hospital     Medication List          Accurate as of January 11, 2022 11:59 PM. If you have any questions, ask your nurse or doctor. CONTINUE taking these medications    buprenorphine-naloxone 8-2 MG Film SL film  Commonly known as: SUBOXONE  Place 1 Film under the tongue 2 times daily for 14 days.      FLUoxetine 10 MG capsule  Commonly known as: PROZAC  Take 1 capsule by mouth daily     gabapentin 300 MG capsule  Commonly known as: NEURONTIN     naloxone 4 MG/0.1ML Liqd nasal spray  1 spray by Nasal route as needed for Opioid Reversal     ondansetron 4 MG tablet  Commonly known as: ZOFRAN  TAKE 1 TABLET BY MOUTH THREE TIMES DAILY AS NEEDED FOR NAUSEA OR  VOMITING     triamterene-hydroCHLOROthiazide 75-50 MG per tablet  Commonly known as: Bienvenido Guzman           Where to Get Your Medications      These medications were sent to [facility], [address] .[facility] [address]  [address] [phone] - F [phone]  [address] [address]    Phone: [phone]   · buprenorphine-naloxone 8-2 MG Film SL film           Medications marked \"taking\" at this time  Outpatient Medications Marked as Taking for the 1/11/22 encounter (Office Visit) with ENMANUEL Rothman CNP   Medication Sig Dispense Refill    [DISCONTINUED] buprenorphine-naloxone (SUBOXONE) 8-2 MG FILM SL film Place 1 Film under the tongue 2 times daily for 14 days. 28 Film 0    [DISCONTINUED] FLUoxetine (PROZAC) 10 MG capsule Take 1 capsule by mouth daily 30 capsule 0    ondansetron (ZOFRAN) 4 MG tablet TAKE 1 TABLET BY MOUTH THREE TIMES DAILY AS NEEDED FOR NAUSEA OR  VOMITING 30 tablet 0    gabapentin (NEURONTIN) 300 MG capsule Take 300 mg by mouth 3 times daily.  naloxone 4 MG/0.1ML LIQD nasal spray 1 spray by Nasal route as needed for Opioid Reversal 1 each 1    triamterene-hydrochlorothiazide (MAXZIDE) 75-50 MG per tablet Take 1 tablet by mouth daily. Medications patient taking as of now reconciled against medications ordered at time of hospital discharge: Yes    Chief Complaint   Patient presents with    Drug Problem       History of Present illness - Follow up of Hospital diagnosis(es): carbon monoxide poisoning    Vivek Workman presented to the ER 12/23 after a fall. She tripped and had pain in her lower back. Xray of the lumbar spine revealed no acute bony abnormality, mild lumbar spondylosis, and dextroscoliosis. She represented on 12/27 for altered mental status. She was found to be bradycardic, lethargic, and hypotensive. Was admitted to ICU on TVP. Was sent home with a 30 day even monitor. Scheduled to see Dr. Charlene Hickey 2/16. At last visit we had discussed at length, and sent patient home with Celexa wean as it was no longer effective. She is now off of it entirely and taking prozac. Reports that her mood has improved significantly. Is a current everyday smoker x 20+ years. Interested in smoking cessation. Has been on Chantix previously.  Feels that it was helpful but she did not realize she was to continue it long term. Would like to try it again. Was referred to pulmonary for severe COPD. She is unable to be seen until next month, will try [facility]. She also has a left leg wound that will not heal. States that it has been present > 1 year. Urges and cravings controlled with Suboxone 8 mg BID    Urine positive for buprenorphine and THC     Please see below or view chart for more details from hospital course. Inpatient course: Discharge summary reviewed- see chart. Interval history/Current status:     A comprehensive review of systems was negative except for what was noted in the HPI. Vitals:    01/11/22 1410   BP: (!) 147/93   Site: Right Lower Arm   Position: Sitting   Pulse: 87   Resp: 20   Temp: 97.1 °F (36.2 °C)   TempSrc: Tympanic   Weight: 97 lb 3.2 oz (44.1 kg)   Height: 5' 5.5\" (1.664 m)     Body mass index is 15.93 kg/m². Wt Readings from Last 3 Encounters:   01/25/22 99 lb (44.9 kg)   01/11/22 97 lb 3.2 oz (44.1 kg)   01/03/22 119 lb 1.6 oz (54 kg)     BP Readings from Last 3 Encounters:   01/25/22 (!) 115/91   01/11/22 (!) 147/93   01/03/22 137/69        Physical Exam  Vitals reviewed. Constitutional:       General: She is not in acute distress. Appearance: Normal appearance. She is not ill-appearing. HENT:      Head: Normocephalic and atraumatic. Right Ear: External ear normal.      Left Ear: External ear normal.      Nose: Nose normal. No congestion or rhinorrhea. Mouth/Throat:      Mouth: Mucous membranes are moist.   Eyes:      Extraocular Movements: Extraocular movements intact. Conjunctiva/sclera: Conjunctivae normal.      Pupils: Pupils are equal, round, and reactive to light. Cardiovascular:      Rate and Rhythm: Normal rate and regular rhythm. Pulses: Normal pulses. Heart sounds: Normal heart sounds. Pulmonary:      Effort: Pulmonary effort is normal. No respiratory distress.       Breath sounds: Examination of the right-lower field reveals decreased breath sounds. Examination of the left-lower field reveals decreased breath sounds. Decreased breath sounds present. No rhonchi. Musculoskeletal:         General: Normal range of motion. Cervical back: Normal range of motion and neck supple. Right lower leg: No edema. Left lower leg: No edema. Skin:     General: Skin is warm and dry. Findings: Lesion (LLE) present. Neurological:      General: No focal deficit present. Mental Status: She is alert and oriented to person, place, and time. Psychiatric:         Mood and Affect: Mood normal.         Behavior: Behavior normal.         Thought Content: Thought content normal.         Judgment: Judgment normal.           Assessment/Plan:    1. Severe opioid use disorder (HCC)    - POCT Rapid Drug Screen  - buprenorphine-naloxone (SUBOXONE) 8-2 MG FILM SL film; Place 1 Film under the tongue 2 times daily for 14 days. Dispense: 28 Film; Refill: 0  - Narcan at home  - Labs not yet obtained  - OARRS reviewed, no discrepancies  - Encouraged to attend NA/AA meetings and/or arrange for individualized counseling    2. Wound of left lower extremity, initial encounter    - External Referral To Dermatology    3. Chronic obstructive pulmonary disease, unspecified COPD type ([facility] [address]    - See if patient can be scheduled at [facility] with pulmonary sooner than she can be seen here     4. Bradycardia    - Follow up with cardiology as scheduled    5.  Hospital discharge follow-up        Medical Decision Making: moderate complexity

## 2022-01-12 DIAGNOSIS — F39 MOOD DISORDER (HCC): ICD-10-CM

## 2022-01-12 RX ORDER — FLUOXETINE 10 MG/1
CAPSULE ORAL
Qty: 21 CAPSULE | Refills: 0 | Status: SHIPPED | OUTPATIENT
Start: 2022-01-12 | End: 2022-02-02 | Stop reason: SDUPTHER

## 2022-01-25 ENCOUNTER — OFFICE VISIT (OUTPATIENT)
Dept: INTERNAL MEDICINE CLINIC | Age: 57
End: 2022-01-25
Payer: COMMERCIAL

## 2022-01-25 VITALS
WEIGHT: 99 LBS | HEART RATE: 78 BPM | SYSTOLIC BLOOD PRESSURE: 115 MMHG | TEMPERATURE: 97.2 F | HEIGHT: 66 IN | DIASTOLIC BLOOD PRESSURE: 91 MMHG | BODY MASS INDEX: 15.91 KG/M2

## 2022-01-25 DIAGNOSIS — Z12.11 SCREENING FOR COLON CANCER: ICD-10-CM

## 2022-01-25 DIAGNOSIS — J44.9 CHRONIC OBSTRUCTIVE PULMONARY DISEASE, UNSPECIFIED COPD TYPE (HCC): ICD-10-CM

## 2022-01-25 DIAGNOSIS — F11.20 SEVERE OPIOID USE DISORDER (HCC): Primary | ICD-10-CM

## 2022-01-25 DIAGNOSIS — R63.4 WEIGHT LOSS: ICD-10-CM

## 2022-01-25 DIAGNOSIS — Z72.0 TOBACCO ABUSE: ICD-10-CM

## 2022-01-25 PROCEDURE — 80305 DRUG TEST PRSMV DIR OPT OBS: CPT | Performed by: NURSE PRACTITIONER

## 2022-01-25 PROCEDURE — 99214 OFFICE O/P EST MOD 30 MIN: CPT | Performed by: NURSE PRACTITIONER

## 2022-01-25 RX ORDER — VARENICLINE TARTRATE
KIT
Qty: 1 BOX | Refills: 0 | Status: SHIPPED | OUTPATIENT
Start: 2022-01-25 | End: 2022-03-22

## 2022-01-25 RX ORDER — BUPRENORPHINE AND NALOXONE 8; 2 MG/1; MG/1
1 FILM, SOLUBLE BUCCAL; SUBLINGUAL 2 TIMES DAILY
Qty: 42 FILM | Refills: 0 | Status: SHIPPED | OUTPATIENT
Start: 2022-01-25 | End: 2022-02-16 | Stop reason: SDUPTHER

## 2022-01-25 RX ORDER — VARENICLINE TARTRATE
KIT
Qty: 1 BOX | Refills: 0 | Status: SHIPPED | OUTPATIENT
Start: 2022-01-25 | End: 2022-01-25 | Stop reason: SDUPTHER

## 2022-01-25 RX ORDER — BUPRENORPHINE AND NALOXONE 8; 2 MG/1; MG/1
1 FILM, SOLUBLE BUCCAL; SUBLINGUAL 2 TIMES DAILY
Qty: 42 FILM | Refills: 0 | Status: SHIPPED | OUTPATIENT
Start: 2022-01-25 | End: 2022-01-25 | Stop reason: SDUPTHER

## 2022-01-25 NOTE — PROGRESS NOTES
[facility]IC[facility]ICATION MANAGEMENT  [address]  Dept: [phone]  Dept Fax: [phone]: [phone]     Visit Date:  1/25/2022    Patient:  Ashley Rogers  YOB: 1965    HPI:     Chief Complaint   Patient presents with    Drug Problem     Kellie Malone presents today for medical evaluation of severe opioid use disorder, fatigue, weight loss, COPD, and tobacco abuse     I last seen her 2 weeks ago. She was hospitalized  12/27-1/3 for symptomatic bradycardia, carbon monoxide poisoning, altered mental status, and severe malnutrition. She is wearing a 30 day event monitor. No chest pain or palpitations. BP today in office 115/91, HR 78. Scheduled to see cardiology 2/16, pulmonary 3/24. PFTs completed. Severe COPD. She is a current every day smoker, interested in smoking cessation. Did well on chantix previously, but started smoking again once she finished the starter pack (did not realize that she could continue it). CT chest completed. There are small nodules, need repeated in 6 months. She continues to lose weight. No family history of GI cancers. BMI is 16.22%. Has never had an EGD or colonoscopy. No significant nausea/vomiting. No blood in her stool. No constipation/diarrhea. Hep C ordered, not yet obtained. Liver enzymes WNL.    Urges and cravings controlled with Suboxone 8 mg BID    She denies any use     Urine positive for buprenorphine and THC     Not active in counseling    Patient state that her mood has improved since stopping celexa and starting prozac. Medications    Current Outpatient Medications:     buprenorphine-naloxone (SUBOXONE) 8-2 MG FILM SL film, Place 1 Film under the tongue 2 times daily for 21 days. , Disp: 42 Film, Rfl: 0    varenicline (CHANTIX STARTING MONTH PAK) 0.5 MG X 11 & 1 MG X 42 tablet, Take by mouth., Disp: 1 box, Rfl: 0    ondansetron (ZOFRAN) 4 MG tablet, TAKE 1 TABLET BY MOUTH THREE TIMES DAILY AS NEEDED FOR NAUSEA OR  VOMITING, Disp: 30 tablet, Rfl: 0    gabapentin (NEURONTIN) 300 MG capsule, Take 300 mg by mouth 3 times daily. , Disp: , Rfl:     naloxone 4 MG/0.1ML LIQD nasal spray, 1 spray by Nasal route as needed for Opioid Reversal, Disp: 1 each, Rfl: 1    triamterene-hydrochlorothiazide (MAXZIDE) 75-50 MG per tablet, Take 1 tablet by mouth daily. , Disp: , Rfl:     FLUoxetine (PROZAC) 10 MG capsule, Take 1 capsule by mouth daily, Disp: 30 capsule, Rfl: 1    The patient has No Known Allergies. Past Medical History  Rj Holcomb  has a past medical history of COPD (chronic obstructive pulmonary disease) ([facility] [address] and High blood pressure. Past Surgical History  The patient  has a past surgical history that includes Tubal ligation (4-2002); hernia repair (4-2002); laparoscopy (1994?); Wrist surgery; and cyst removal.    Family History  This patient's family history includes Cancer in her father; Emphysema in her mother; Esophageal Cancer in her father; Heart Disease in her mother. Social History  Rj Holcomb  reports that she has been smoking cigarettes. She started smoking about 42 years ago. She has a 40.00 pack-year smoking history. She has never used smokeless tobacco. She reports previous alcohol use. She reports previous drug use. Drug: Opiates .     Health Maintenance:    Health Maintenance   Topic Date Due    Hepatitis C screen  Never done    COVID-19 Vaccine (1) Never done    Pneumococcal 0-64 years Vaccine (1 of 2 - PPSV23) Never done    HIV screen  Never done    DTaP/Tdap/Td vaccine (1 - Tdap) Never done    Cervical cancer screen  Never done    Lipid screen  Never done    Colon cancer screen colonoscopy  Never done    Shingles Vaccine (1 of 2) Never done    Breast cancer screen  11/21/2013    Flu vaccine (1) Never done    Depression Monitoring  10/07/2022    Potassium monitoring  01/02/2023    Creatinine monitoring  01/02/2023    Low dose CT lung screening  01/05/2023    Hepatitis A vaccine  Aged Out    Hepatitis B vaccine  Aged Out    Hib vaccine  Aged Out    Meningococcal (ACWY) vaccine  Aged Out       Subjective:      Review of Systems   Constitutional: Positive for fatigue and unexpected weight change (weight loss). Negative for chills and fever. HENT: Negative for congestion, dental problem, rhinorrhea, sinus pressure, sinus pain, sore throat, tinnitus and trouble swallowing. Eyes: Negative for photophobia and visual disturbance. Respiratory: Positive for shortness of breath (on exertion). Negative for cough and wheezing. Cardiovascular: Negative for chest pain, palpitations and leg swelling. Gastrointestinal: Negative for abdominal distention, abdominal pain, blood in stool, constipation, diarrhea, nausea and vomiting. Endocrine: Negative for polydipsia, polyphagia and polyuria. Genitourinary: Negative for difficulty urinating, dysuria, frequency, hematuria and urgency. Musculoskeletal: Positive for back pain (chronic). Negative for arthralgias and myalgias. Skin: Negative for rash and wound. Neurological: Negative for dizziness, light-headedness and headaches. Psychiatric/Behavioral: Negative for dysphoric mood and sleep disturbance. The patient is not nervous/anxious. Objective:     BP (!) 115/91 (Site: Left Lower Arm, Position: Sitting, Cuff Size: Medium Adult)   Pulse 78   Temp 97.2 °F (36.2 °C) (Temporal)   Ht 5' 5.5\" (1.664 m)   Wt 99 lb (44.9 kg)   BMI 16.22 kg/m²     Physical Exam  Vitals reviewed. Constitutional:       General: She is not in acute distress. Appearance: Normal appearance. She is not ill-appearing. HENT:      Head: Normocephalic and atraumatic. Right Ear: External ear normal.      Left Ear: External ear normal.      Nose: Nose normal. No congestion or rhinorrhea.       Mouth/Throat:      Mouth: Mucous membranes are moist.   Eyes:      Extraocular Movements: Extraocular movements intact. Conjunctiva/sclera: Conjunctivae normal.      Pupils: Pupils are equal, round, and reactive to light. Cardiovascular:      Rate and Rhythm: Normal rate and regular rhythm. Pulses: Normal pulses. Heart sounds: Normal heart sounds. No murmur heard. Pulmonary:      Effort: Pulmonary effort is normal. No respiratory distress. Breath sounds: Examination of the right-lower field reveals decreased breath sounds. Examination of the left-lower field reveals decreased breath sounds. Decreased breath sounds present. No rhonchi. Abdominal:      General: Abdomen is flat. Bowel sounds are normal. There is no distension. Palpations: Abdomen is soft. Musculoskeletal:         General: Normal range of motion. Cervical back: Normal range of motion and neck supple. Right lower leg: No edema. Left lower leg: No edema. Skin:     General: Skin is warm and dry. Findings: No erythema, lesion or rash. Neurological:      General: No focal deficit present. Mental Status: She is alert and oriented to person, place, and time. Psychiatric:         Mood and Affect: Mood normal.         Behavior: Behavior normal.         Thought Content: Thought content normal.         Judgment: Judgment normal.         Labs Reviewed 1/25/2022:    Lab Results   Component Value Date    WBC 8.0 12/31/2021    HGB 11.8 (L) 12/31/2021    HCT 37.2 12/31/2021     12/31/2021    ALT 13 12/28/2021    AST 22 12/28/2021     01/02/2022    K 3.6 01/02/2022     01/02/2022    CREATININE 0.8 01/02/2022    BUN 15 01/02/2022    CO2 24 01/02/2022    TSH 0.822 12/27/2021    INR 0.99 12/31/2021       Assessment/Plan      1. Severe opioid use disorder (HCC)    - POCT Rapid Drug Screen  - buprenorphine-naloxone (SUBOXONE) 8-2 MG FILM SL film; Place 1 Film under the tongue 2 times daily for 21 days.   Dispense: 42 Film; Refill: 0  - Narcan at home  - Labs not yet obtained  - OARRS reviewed, no discrepancies  - Encouraged to attend NA/AA meetings and/or arrange for individualized counseling    2. Screening for colon cancer    - External Referral To Gastroenterology    3. Weight loss    4. Chronic obstructive pulmonary disease, unspecified COPD type ([facility] [address]    - See if patient can be scheduled at [facility] with pulmonary sooner than she can be seen here     5. Tobacco abuse    - varenicline (CHANTIX STARTING MONTH PAK) 0.5 MG X 11 & 1 MG X 42 tablet; Take by mouth. Dispense: 1 box; Refill: 0      Return in about 3 weeks (around 2/15/2022). Patient given educational materials - see patient instructions. Discussed use, benefit, and side effects of prescribed medications. All patient questions answered. Pt voiced understanding. Reviewed health maintenance.        Electronically signed ENMANUEL Key - CNP on 1/25/22 at 3:03 PM EST

## 2022-01-27 ENCOUNTER — TELEPHONE (OUTPATIENT)
Dept: CARDIOLOGY CLINIC | Age: 57
End: 2022-01-27

## 2022-01-27 ENCOUNTER — TELEPHONE (OUTPATIENT)
Dept: INTERNAL MEDICINE CLINIC | Age: 57
End: 2022-01-27

## 2022-01-27 NOTE — TELEPHONE ENCOUNTER
Event Monitor strip received from [facility] this am.  Shows a serious event of sinus rhythm with SVT with run of Vtach. This was scanned into the chart. Pt has an appt with Dr. Kenny Maldonado in February. I called [address] and spoke to Elease Even as this was ordered by a resident and we do not follow on OP basis. She will take a look and discuss with Dr. Kenny Maldonado.

## 2022-01-27 NOTE — TELEPHONE ENCOUNTER
Pily Mitchell from [place] office asking Dr Nadia Salter to review event monitor.   Has Juan Francisco appt 2/16/22

## 2022-01-28 NOTE — TELEPHONE ENCOUNTER
Joi-- commented on what the rhythm was--see his notation below-- but that is all as he has not seen the pt yet. Forwarding this on to your for further recommendations as she lists you as pcp.

## 2022-01-28 NOTE — TELEPHONE ENCOUNTER
Please see note from [facility] below, has appt with [facility] 2/16/22. New onset at flutter no oral anticoags.

## 2022-01-29 NOTE — TELEPHONE ENCOUNTER
Will await cardiology appt. Thank you. [id number] Score for Atrial Fibrillation Stroke Risk   Risk   Factors  Component Value   C CHF No 0   H HTN No 0   A2 Age >= 75 No,  (57 y.o.) 0   D DM No 0   S2 Prior Stroke/TIA No 0   V Vascular Disease No 0   A Age 74-69 No,  (57 y.o.) 0   Sc Sex female 1    [id number]  Score  1   Score last updated 1/28/22 1:87 PM EST    Click here for a link to the UpToDate guideline \"Atrial Fibrillation: Anticoagulation therapy to prevent embolization    Disclaimer: Risk Score calculation is dependent on accuracy of patient problem list and past encounter diagnosis.

## 2022-02-01 DIAGNOSIS — F11.20 SEVERE OPIOID USE DISORDER (HCC): ICD-10-CM

## 2022-02-01 RX ORDER — BUPRENORPHINE AND NALOXONE 8; 2 MG/1; MG/1
FILM, SOLUBLE BUCCAL; SUBLINGUAL
Qty: 28 FILM | OUTPATIENT
Start: 2022-02-01

## 2022-02-02 DIAGNOSIS — J44.9 CHRONIC OBSTRUCTIVE PULMONARY DISEASE, UNSPECIFIED COPD TYPE (HCC): Primary | ICD-10-CM

## 2022-02-02 DIAGNOSIS — R06.02 SOB (SHORTNESS OF BREATH): ICD-10-CM

## 2022-02-02 DIAGNOSIS — F39 MOOD DISORDER (HCC): ICD-10-CM

## 2022-02-02 RX ORDER — FLUOXETINE 10 MG/1
10 CAPSULE ORAL DAILY
Qty: 30 CAPSULE | Refills: 1 | Status: SHIPPED | OUTPATIENT
Start: 2022-02-02 | End: 2022-03-22 | Stop reason: SDUPTHER

## 2022-02-02 RX ORDER — FLUOXETINE 10 MG/1
10 CAPSULE ORAL DAILY
Qty: 30 CAPSULE | Refills: 0 | Status: CANCELLED | OUTPATIENT
Start: 2022-02-02

## 2022-02-02 NOTE — TELEPHONE ENCOUNTER
Patient called stating she has not heard about her appointment with dermatology, GI and states she was supposed to get in with [facility] pulmonology sooner. Patient is scheduled with dermatology  Dr. Azucena Vick 2/22 @ 11:00   [address] [facility] [address]    She was referred to [facility]. Confirmed with [facility] they received referral and mailed out paperwork. They need that paperwork filled out and returned before they can schedule her an appointment.

## 2022-02-05 NOTE — PROCEDURES
[address] [id number]                                 EVENT MONITOR    PATIENT NAME: Francesca Nguyen                      :        1965  MED REC NO:   [id number]                           ROOM:       0001  ACCOUNT NO:   [de-identified]                           ADMIT DATE: 2021  PROVIDER:     Rakesh Dai M.D.    STUDY ENROLLMENT PERIOD:  2022 to 2022. FINDINGS:  The predominant rhythm is sinus with heart rate varying  between 47 to 93 beats per minute, average heart rate of 62 beats per  minute. Several transmissions for narrow complex tachycardia, fastest  rate of 162 beats per minute with a total burden of 10%. Runs of wide  QRS tachycardia, longest run 4 beats with a rate of 170 beats per  minute, likely aberrantly conducted atrial tachycardia. Isolated  premature ventricular complexes. No significant pauses.         Kiana Singer M.D.    D: 2022 16:03:25       T: 2022 16:48:03     [id number]  Job#: [id number]     Doc#: [id number]

## 2022-02-14 ASSESSMENT — ENCOUNTER SYMPTOMS
SINUS PRESSURE: 0
TROUBLE SWALLOWING: 0
ABDOMINAL PAIN: 0
BLOOD IN STOOL: 0
WHEEZING: 0
SHORTNESS OF BREATH: 1
CONSTIPATION: 0
PHOTOPHOBIA: 0
DIARRHEA: 0
VOMITING: 0
ABDOMINAL DISTENTION: 0
RHINORRHEA: 0
BACK PAIN: 1
SINUS PAIN: 0
COUGH: 0
SORE THROAT: 0
NAUSEA: 0

## 2022-02-16 ENCOUNTER — TELEPHONE (OUTPATIENT)
Dept: INTERNAL MEDICINE CLINIC | Age: 57
End: 2022-02-16

## 2022-02-16 ENCOUNTER — OFFICE VISIT (OUTPATIENT)
Dept: INTERNAL MEDICINE CLINIC | Age: 57
End: 2022-02-16
Payer: COMMERCIAL

## 2022-02-16 ENCOUNTER — OFFICE VISIT (OUTPATIENT)
Dept: CARDIOLOGY CLINIC | Age: 57
End: 2022-02-16
Payer: COMMERCIAL

## 2022-02-16 VITALS
DIASTOLIC BLOOD PRESSURE: 68 MMHG | TEMPERATURE: 98.4 F | HEART RATE: 90 BPM | SYSTOLIC BLOOD PRESSURE: 113 MMHG | HEIGHT: 65 IN | WEIGHT: 105 LBS | BODY MASS INDEX: 17.49 KG/M2

## 2022-02-16 VITALS
BODY MASS INDEX: 17.51 KG/M2 | DIASTOLIC BLOOD PRESSURE: 84 MMHG | SYSTOLIC BLOOD PRESSURE: 135 MMHG | WEIGHT: 105.1 LBS | HEART RATE: 79 BPM | HEIGHT: 65 IN

## 2022-02-16 DIAGNOSIS — R00.1 SYMPTOMATIC BRADYCARDIA: ICD-10-CM

## 2022-02-16 DIAGNOSIS — R63.4 WEIGHT LOSS: ICD-10-CM

## 2022-02-16 DIAGNOSIS — F11.20 SEVERE OPIOID USE DISORDER (HCC): Primary | ICD-10-CM

## 2022-02-16 DIAGNOSIS — R06.02 SOB (SHORTNESS OF BREATH): ICD-10-CM

## 2022-02-16 DIAGNOSIS — J44.9 CHRONIC OBSTRUCTIVE PULMONARY DISEASE, UNSPECIFIED COPD TYPE (HCC): ICD-10-CM

## 2022-02-16 DIAGNOSIS — J02.9 PHARYNGITIS, UNSPECIFIED ETIOLOGY: ICD-10-CM

## 2022-02-16 DIAGNOSIS — R00.2 PALPITATIONS: Primary | ICD-10-CM

## 2022-02-16 PROCEDURE — 99214 OFFICE O/P EST MOD 30 MIN: CPT | Performed by: NURSE PRACTITIONER

## 2022-02-16 PROCEDURE — 80305 DRUG TEST PRSMV DIR OPT OBS: CPT | Performed by: NURSE PRACTITIONER

## 2022-02-16 PROCEDURE — 93000 ELECTROCARDIOGRAM COMPLETE: CPT | Performed by: INTERNAL MEDICINE

## 2022-02-16 PROCEDURE — 99204 OFFICE O/P NEW MOD 45 MIN: CPT | Performed by: INTERNAL MEDICINE

## 2022-02-16 RX ORDER — BUPRENORPHINE AND NALOXONE 8; 2 MG/1; MG/1
1 FILM, SOLUBLE BUCCAL; SUBLINGUAL 2 TIMES DAILY
Qty: 56 FILM | Refills: 0 | Status: SHIPPED | OUTPATIENT
Start: 2022-02-16 | End: 2022-03-16 | Stop reason: SDUPTHER

## 2022-02-16 RX ORDER — AZITHROMYCIN 250 MG/1
250 TABLET, FILM COATED ORAL SEE ADMIN INSTRUCTIONS
Qty: 6 TABLET | Refills: 0 | Status: SHIPPED | OUTPATIENT
Start: 2022-02-16 | End: 2022-02-21

## 2022-02-16 RX ORDER — MELOXICAM 15 MG/1
15 TABLET ORAL DAILY
COMMUNITY
Start: 2021-12-08 | End: 2022-05-12 | Stop reason: SDUPTHER

## 2022-02-16 NOTE — TELEPHONE ENCOUNTER
Called pharmacy to check on Chantix script.  Per pharmacy does not require a prior authorization but copy is $535

## 2022-02-16 NOTE — PROGRESS NOTES
[facility] ()  [address] [place] [id number]  Dept: [phone]  CARDIAC ELECTROPHYSIOLOGY: CONSULTATION NOTE  PATIENT DEMOGRAPHICS:  Date:   2/16/2022  Patient name:              Kimani Tobar  YOB: 1965  Sex: female   MRN:   [id number]    PRIMARY CARE PHYSICIAN:   ENMANUEL Torres CNP    REFERRING PROVIDER:  ENMANUEL Torres CNP    REASON FOR CONSULTATION:  Atrial flutter. HISTORY OF PRESENT ILLNESS:  57/F was admitted to [facility] in 12/2022 with carbon monoxide poisoning when she was noted to have altered mental status by her family members. They use propane heater in garage. In ER she was noted to have sinus bradycardia (rate 40 BPM) requiring placement of the temporary transvenous pacing wire. Upon recovery she was sent home with a 30-day event monitor that shows runs of atrial flutter with intermittent aberrancy. Patient recalls having an episode of palpitations lasting for several minutes and spontaneously, which corresponds to tachycardia event on event monitor. However, with a total burden of 10% she was asymptomatic during most of the episodes. She is being evaluated for pulmonary nodules and has an appointment with a pulmonologist at [address] [place] [address].  Shortness of breath with physical activity. No chest pain, orthopnea, proximal nocturnal dyspnea syncope. Medical Hx: atrial flutter with intermittent aberrancy (1/27/22), sinus bradycardia (1/2022), HTN, macrocytic anemia, chronic smoking, weight loss, bilateral pulmonary nodules under evaluation and COPD    REVIEW OF SYSTEMS:    Constitutional: Negative for chills and fever  HENT: Negative for congestion, sinus pressure, sneezing and sore throat. Eyes: Negative for pain, discharge, redness and itching.    Respiratory: Negative for apnea, cough  Gastrointestinal: Negative for blood in stool, constipation, diarrhea   Endocrine: Negative for cold intolerance, heat intolerance, polydipsia. Genitourinary: Negative for dysuria, enuresis, flank pain and hematuria. Musculoskeletal: Negative for arthralgias, joint swelling and neck pain. Neurological: Negative for numbness and headaches. Psychiatric/Behavioral: Negative for agitation, confusion, decreased concentration and dysphoric mood. PAST MEDICAL HISTORY:  Past Medical History:   Diagnosis Date    COPD (chronic obstructive pulmonary disease) ([address]     High blood pressure        PSH:  Past Surgical History:   Procedure Laterality Date    CYST REMOVAL      vagina    HERNIA REPAIR  4-2002    LAPAROSCOPY  1994?  TUBAL LIGATION  4-2002    WRIST SURGERY         FAMILY HISTORY:  Family History   Problem Relation Age of Onset    Heart Disease Mother     Emphysema Mother     Esophageal Cancer Father     Cancer Father         SOCIAL HISTORY:   and lives with . 5 children. Has been smoking 2 packs of cigarettes for a long time now in the process of cutting down.   Currently in search of a job  Social History     Socioeconomic History    Marital status:      Spouse name: Not on file    Number of children: Not on file    Years of education: Not on file    Highest education level: Not on file   Occupational History    Not on file   Tobacco Use    Smoking status: Current Every Day Smoker     Packs/day: 1.00     Years: 40.00     Pack years: 40.00     Types: Cigarettes     Start date: 1/15/1980    Smokeless tobacco: Never Used   Substance and Sexual Activity    Alcohol use: Not Currently    Drug use: Not Currently     Types: Opiates     Sexual activity: Not on file   Other Topics Concern    Not on file   Social History Narrative    Not on file     Social Determinants of Health     Financial Resource Strain:     Difficulty of Paying Living Expenses: Not on file   Food Insecurity:     Worried About [address] in the Last Year: Not on file    [facility] [facility] [address] in the Last Year: Not on file   Transportation Needs:     Lack of Transportation (Medical): Not on file    Lack of Transportation (Non-Medical): Not on file   Physical Activity:     Days of Exercise per Week: Not on file    Minutes of Exercise per Session: Not on file   Stress:     Feeling of Stress : Not on file   Social Connections:     Frequency of Communication with Friends and Family: Not on file    Frequency of Social Gatherings with Friends and Family: Not on file    Attends Zoroastrianism Services: Not on file    Active Member of [address] [place] [address] Fighters or Organizations: Not on file    Attends Club or Organization Meetings: Not on file    Marital Status: Not on file   Intimate Partner Violence:     Fear of Current or Ex-Partner: Not on file    Emotionally Abused: Not on file    Physically Abused: Not on file    Sexually Abused: Not on file   Housing Stability:     Unable to Pay for Housing in the Last Year: Not on file    Number of [place] in the Last Year: Not on file    Unstable Housing in the Last Year: Not on file        ALLERGY HISTORY:  No Known Allergies     MEDICATIONS:  Current Outpatient Medications   Medication Sig Dispense Refill    FLUoxetine (PROZAC) 10 MG capsule Take 1 capsule by mouth daily 30 capsule 1    varenicline (CHANTIX STARTING MONTH PAK) 0.5 MG X 11 & 1 MG X 42 tablet Take by mouth. 1 box 0    ondansetron (ZOFRAN) 4 MG tablet TAKE 1 TABLET BY MOUTH THREE TIMES DAILY AS NEEDED FOR NAUSEA OR  VOMITING 30 tablet 0    gabapentin (NEURONTIN) 300 MG capsule Take 300 mg by mouth 3 times daily.  naloxone 4 MG/0.1ML LIQD nasal spray 1 spray by Nasal route as needed for Opioid Reversal 1 each 1    triamterene-hydrochlorothiazide (MAXZIDE) 75-50 MG per tablet Take 1 tablet by mouth daily. No current facility-administered medications for this visit. PHYSICAL EXAM:  Vitals:    02/16/22 1130   BP: 135/84   Pulse: 79     Body mass index is 17.49 kg/m².     GENERAL: Alert and oriented. No distress. Undernourished. EYES: No pallor or icterus. ENT: No cyanosis. No thyromegaly or cervical LAP. VESSELS: No jugular venous distension or carotid bruits. HEART: Normal S1/S2. No murmur, rub or gallop. LUNGS: Expiratory wheezes bilaterally. Decreased air entry bilaterally. ABDOMEN: Scaphoid, soft and non-tender. EXTREMITIES: No lower extremity edema. Feet are warm. NEUROLOGICAL: Grossly normal.     LABORATORY DATA AND DIAGNOSTIC DATA:  I have personally reviewed and interpreted the results of the following diagnostic testing    Lab Results   Component Value Date    WBC 8.0 12/31/2021    HGB 11.8 (L) 12/31/2021    HCT 37.2 12/31/2021     12/31/2021    ALT 13 12/28/2021    AST 22 12/28/2021     01/02/2022    K 3.6 01/02/2022     01/02/2022    CREATININE 0.8 01/02/2022    BUN 15 01/02/2022    CO2 24 01/02/2022    TSH 0.822 12/27/2021    INR 0.99 12/31/2021      Echocardiogram LVEF 50%, LVWT 0.9cm, LAD/SURI NA. No significant valvular abnormalities.  ECG 12/27/2021: Sinus bradycardia (46 BPM). Normal QRS.  Coronary angiogram 12/27/2021: No CAD.  Event monitor: Narrow complex tachycardia (fastest 162 BPM and burden 10%) with intermittent aberrancy. IMPRESSION:  · Narrow complex tachycardia with intermittent aberrancy, total burden 10%. Likely atrial tachycardia/flutter. · HTN, controlled. · Smoking and COPD  · Weight loss. · History of opioi abuse, now clean. ASSESSMENT AND RECOMMENDATIONS:  Patient signed bradycardia has resolved. She has low burden atrial flutter with intermittent aberrancy. She was asymptomatic during most of the episodes. We will start her on metoprolol tartrate 25 mg twice daily. Discussed the importance of quit smoking and management of her COPD and decreasing the risk of atrial arrhythmias. No indication for anticoagulation. Follow-up in 3 months with 48 hours Holter monitor.   Discussed plan with the patient and she is in agreement. Thank you for allowing me to participate in the care of your patient. Please call me if you have any questions. **This report has been created using voice recognition software. It may contain minor errors which are inherent in voice recognition technology. **       Owen Ramos MD, MRCP, [facility] on 2/16/2022 at 12:18 PM

## 2022-02-16 NOTE — PROGRESS NOTES
[address] INTERNAL MEDICINE AND MEDICATION MANAGEMENT  [facility] [address]  Dept: [phone]  Dept Fax: [phone]: [phone]     Visit Date:  2/16/2022    Patient:  Whit Gonzalez  YOB: 1965    HPI:     Chief Complaint   Patient presents with    Drug Problem     Malathi De Jesus presents today for medical evaluation of severe opioid use disorder, fatigue, weight loss, COPD, tobacco abuse, and pharyngitis      I last seen her 3 weeks ago.     She was hospitalized 12/27-1/3 for symptomatic bradycardia, carbon monoxide poisoning, altered mental status, and severe malnutrition. Had a visit with cardiology today, 30 day event monitor reviewed, and she was started on metoprolol. She is scheduled to repeat event monitor in June. No chest pain or palpitations. BP today in office 113/68, HR 90. Scheduled to see pulmonary 3/24. PFTs completed. Severe COPD. She is a current every day smoker, interested in smoking cessation. Did well on chantix previously, but started smoking again once she finished the starter pack (did not realize that she could continue it). Insurance refuses to cover, and it is too expensive for patient to afford. CT chest completed. There are small nodules, need repeated in 6 months.      Weight is up today to 105 lbs from 99 lbs. No family history of GI cancers. Has never had an EGD or colonoscopy. No significant nausea/vomiting. No blood in her stool. No constipation/diarrhea. Hep C ordered, not yet obtained. Liver enzymes WNL.    Urges and cravings controlled with Suboxone 8 mg BID     She denies any use     Urine positive for buprenorphine and THC      Not active in counseling     Patient state that her mood has improved since stopping celexa and starting prozac. Malathi De Jesus complains of sore throat, fatigue, and body aches x4 days. She is afebrile.      Medications    Current Outpatient Medications:    meloxicam (MOBIC) 15 MG tablet, , Disp: , Rfl:     VITAMIN D PO, Take by mouth, Disp: , Rfl:     ZINC PO, Take by mouth, Disp: , Rfl:     metoprolol tartrate (LOPRESSOR) 25 MG tablet, Take 1 tablet by mouth 2 times daily, Disp: 180 tablet, Rfl: 1    buprenorphine-naloxone (SUBOXONE) 8-2 MG FILM SL film, Place 1 Film under the tongue 2 times daily for 28 days. , Disp: 56 Film, Rfl: 0    FLUoxetine (PROZAC) 10 MG capsule, Take 1 capsule by mouth daily, Disp: 30 capsule, Rfl: 1    ondansetron (ZOFRAN) 4 MG tablet, TAKE 1 TABLET BY MOUTH THREE TIMES DAILY AS NEEDED FOR NAUSEA OR  VOMITING, Disp: 30 tablet, Rfl: 0    gabapentin (NEURONTIN) 300 MG capsule, Take 300 mg by mouth 3 times daily. , Disp: , Rfl:     naloxone 4 MG/0.1ML LIQD nasal spray, 1 spray by Nasal route as needed for Opioid Reversal, Disp: 1 each, Rfl: 1    triamterene-hydrochlorothiazide (MAXZIDE) 75-50 MG per tablet, Take 1 tablet by mouth daily. , Disp: , Rfl:     varenicline (CHANTIX STARTING MONTH PAK) 0.5 MG X 11 & 1 MG X 42 tablet, Take by mouth. (Patient not taking: Reported on 2/16/2022), Disp: 1 box, Rfl: 0    The patient has No Known Allergies. Past Medical History  Isaiah  has a past medical history of COPD (chronic obstructive pulmonary disease) ([facility] [address] and High blood pressure. Past Surgical History  The patient  has a past surgical history that includes Tubal ligation (4-2002); hernia repair (4-2002); laparoscopy (1994?); Wrist surgery; and cyst removal.    Family History  This patient's family history includes Cancer in her father; Emphysema in her mother; Esophageal Cancer in her father; Heart Disease in her mother. Social History  Isaiah  reports that she has been smoking cigarettes. She started smoking about 42 years ago. She has a 40.00 pack-year smoking history. She has never used smokeless tobacco. She reports previous alcohol use. She reports previous drug use. Drug: Opiates .     Health Maintenance:    Health Maintenance Topic Date Due    Hepatitis C screen  Never done    COVID-19 Vaccine (1) Never done    Pneumococcal 0-64 years Vaccine (1 of 2 - PPSV23) Never done    HIV screen  Never done    DTaP/Tdap/Td vaccine (1 - Tdap) Never done    Cervical cancer screen  Never done    Lipid screen  Never done    Colorectal Cancer Screen  Never done    Shingles Vaccine (1 of 2) Never done    Breast cancer screen  11/21/2013    Flu vaccine (1) Never done    Depression Monitoring  10/07/2022    Potassium monitoring  01/02/2023    Creatinine monitoring  01/02/2023    Low dose CT lung screening  01/05/2023    Hepatitis A vaccine  Aged Out    Hepatitis B vaccine  Aged Out    Hib vaccine  Aged Out    Meningococcal (ACWY) vaccine  Aged Out       Subjective:      Review of Systems   Constitutional: Positive for fatigue. Negative for chills, fever and unexpected weight change. HENT: Positive for sore throat. Negative for congestion, dental problem, rhinorrhea, sinus pressure, sinus pain, tinnitus and trouble swallowing. Eyes: Negative for photophobia and visual disturbance. Respiratory: Positive for shortness of breath (on exertion). Negative for cough and wheezing. Cardiovascular: Negative for chest pain, palpitations and leg swelling. Gastrointestinal: Negative for abdominal distention, abdominal pain, blood in stool, constipation, diarrhea, nausea and vomiting. Endocrine: Negative for polydipsia, polyphagia and polyuria. Genitourinary: Negative for difficulty urinating, dysuria, frequency, hematuria and urgency. Musculoskeletal: Positive for back pain (chronic). Negative for arthralgias and myalgias. Skin: Negative for rash and wound. Neurological: Negative for dizziness, light-headedness and headaches. Psychiatric/Behavioral: Negative for dysphoric mood and sleep disturbance. The patient is not nervous/anxious.         Objective:     /68 (Site: Left Lower Arm, Position: Sitting, Cuff Size: Medium Adult)   Pulse 90   Temp 98.4 °F (36.9 °C) (Temporal)   Ht 5' 5\" (1.651 m)   Wt 105 lb (47.6 kg)   BMI 17.47 kg/m²     Physical Exam  Vitals reviewed. Constitutional:       General: She is not in acute distress. Appearance: Normal appearance. She is not ill-appearing. HENT:      Head: Normocephalic and atraumatic. Right Ear: External ear normal.      Left Ear: External ear normal.      Nose: Nose normal. No congestion or rhinorrhea. Mouth/Throat:      Mouth: Mucous membranes are moist.      Pharynx: Pharyngeal swelling, oropharyngeal exudate and posterior oropharyngeal erythema present. Eyes:      Extraocular Movements: Extraocular movements intact. Conjunctiva/sclera: Conjunctivae normal.      Pupils: Pupils are equal, round, and reactive to light. Cardiovascular:      Rate and Rhythm: Normal rate and regular rhythm. Pulses: Normal pulses. Heart sounds: Normal heart sounds. No murmur heard. Pulmonary:      Effort: Pulmonary effort is normal. No respiratory distress. Breath sounds: Examination of the right-lower field reveals decreased breath sounds. Examination of the left-lower field reveals decreased breath sounds. Decreased breath sounds present. No rhonchi. Abdominal:      General: Abdomen is flat. Bowel sounds are normal. There is no distension. Palpations: Abdomen is soft. Musculoskeletal:         General: Normal range of motion. Cervical back: Normal range of motion and neck supple. Right lower leg: No edema. Left lower leg: No edema. Skin:     General: Skin is warm and dry. Findings: No erythema, lesion or rash. Neurological:      General: No focal deficit present. Mental Status: She is alert and oriented to person, place, and time. Psychiatric:         Mood and Affect: Mood normal.         Behavior: Behavior normal.         Thought Content:  Thought content normal.         Judgment: Judgment normal.         Labs Reviewed 2/16/2022:    Lab Results   Component Value Date    WBC 8.0 12/31/2021    HGB 11.8 (L) 12/31/2021    HCT 37.2 12/31/2021     12/31/2021    ALT 13 12/28/2021    AST 22 12/28/2021     01/02/2022    K 3.6 01/02/2022     01/02/2022    CREATININE 0.8 01/02/2022    BUN 15 01/02/2022    CO2 24 01/02/2022    TSH 0.822 12/27/2021    INR 0.99 12/31/2021       Assessment/Plan      1. Severe opioid use disorder (HCC)    - POCT Rapid Drug Screen  - buprenorphine-naloxone (SUBOXONE) 8-2 MG FILM SL film; Place 1 Film under the tongue 2 times daily for 28 days. Dispense: 56 Film; Refill: 0  - Narcan at home  - Labs not yet obtained  - OARRS reviewed, no discrepancies  - Encouraged to attend NA/AA meetings and/or arrange for individualized counseling    2. Pharyngitis, unspecified etiology    - azithromycin (ZITHROMAX) 250 MG tablet; Take 1 tablet by mouth See Admin Instructions for 5 days 500mg on day 1 followed by 250mg on days 2 - 5  Dispense: 6 tablet; Refill: 0    3. Chronic obstructive pulmonary disease, unspecified COPD type ([facility] [address]    - Follow with pulmonary as scheduled     4. Symptomatic bradycardia    - Follow with cardiology as scheduled    5. Weight loss    - Follow with GI as scheduled        Return in about 4 weeks (around 3/16/2022). Patient given educational materials - see patient instructions. Discussed use, benefit, and side effects of prescribed medications. All patient questions answered. Pt voiced understanding. Reviewed health maintenance.        Electronically signed ENMANUEL Alanis - CNP on 2/16/22 at 1:47 PM EST

## 2022-02-17 NOTE — TELEPHONE ENCOUNTER
Spoke with patient she doesn't think nicotine patches will work. She was headed to the pharmacy, she wants to ask pharmacist and will call the office back.

## 2022-02-17 NOTE — TELEPHONE ENCOUNTER
I tried a prior authorization via Cover My Meds but it stated prior authorization not required so I got no outcome.

## 2022-02-17 NOTE — TELEPHONE ENCOUNTER
Well we tried. Please see if she is interested in trying nicotine patches until she can get in to see pulmonology.

## 2022-03-08 ASSESSMENT — ENCOUNTER SYMPTOMS
DIARRHEA: 0
WHEEZING: 0
TROUBLE SWALLOWING: 0
COUGH: 0
BACK PAIN: 1
ABDOMINAL PAIN: 0
BLOOD IN STOOL: 0
VOMITING: 0
CONSTIPATION: 0
RHINORRHEA: 0
PHOTOPHOBIA: 0
ABDOMINAL DISTENTION: 0
NAUSEA: 0
SORE THROAT: 1
SINUS PAIN: 0
SINUS PRESSURE: 0
SHORTNESS OF BREATH: 1

## 2022-03-16 ENCOUNTER — TELEPHONE (OUTPATIENT)
Dept: INTERNAL MEDICINE CLINIC | Age: 57
End: 2022-03-16

## 2022-03-16 DIAGNOSIS — F11.20 SEVERE OPIOID USE DISORDER (HCC): ICD-10-CM

## 2022-03-16 RX ORDER — BUPRENORPHINE AND NALOXONE 8; 2 MG/1; MG/1
1 FILM, SOLUBLE BUCCAL; SUBLINGUAL 2 TIMES DAILY
Qty: 8 FILM | Refills: 0 | Status: SHIPPED | OUTPATIENT
Start: 2022-03-16 | End: 2022-03-17 | Stop reason: SDUPTHER

## 2022-03-16 NOTE — TELEPHONE ENCOUNTER
Patient had a colonoscopy done yesterday and isnt feeling well she rescheduled for Monday the 21st and wanted to know if we could send in suboxone until then to [facility]

## 2022-03-17 DIAGNOSIS — F11.20 SEVERE OPIOID USE DISORDER (HCC): ICD-10-CM

## 2022-03-17 RX ORDER — BUPRENORPHINE AND NALOXONE 8; 2 MG/1; MG/1
1 FILM, SOLUBLE BUCCAL; SUBLINGUAL 2 TIMES DAILY
Qty: 8 FILM | Refills: 0 | Status: SHIPPED | OUTPATIENT
Start: 2022-03-17 | End: 2022-03-22 | Stop reason: SDUPTHER

## 2022-03-22 ENCOUNTER — OFFICE VISIT (OUTPATIENT)
Dept: INTERNAL MEDICINE CLINIC | Age: 57
End: 2022-03-22
Payer: COMMERCIAL

## 2022-03-22 VITALS
WEIGHT: 113 LBS | BODY MASS INDEX: 18.83 KG/M2 | DIASTOLIC BLOOD PRESSURE: 60 MMHG | SYSTOLIC BLOOD PRESSURE: 113 MMHG | HEART RATE: 59 BPM | HEIGHT: 65 IN

## 2022-03-22 DIAGNOSIS — J44.9 CHRONIC OBSTRUCTIVE PULMONARY DISEASE, UNSPECIFIED COPD TYPE (HCC): ICD-10-CM

## 2022-03-22 DIAGNOSIS — F11.20 SEVERE OPIOID USE DISORDER (HCC): Primary | ICD-10-CM

## 2022-03-22 DIAGNOSIS — R63.4 WEIGHT LOSS: ICD-10-CM

## 2022-03-22 DIAGNOSIS — F41.9 ANXIETY AND DEPRESSION: ICD-10-CM

## 2022-03-22 DIAGNOSIS — F32.A ANXIETY AND DEPRESSION: ICD-10-CM

## 2022-03-22 DIAGNOSIS — H61.21 RIGHT EAR IMPACTED CERUMEN: ICD-10-CM

## 2022-03-22 PROCEDURE — 99214 OFFICE O/P EST MOD 30 MIN: CPT | Performed by: NURSE PRACTITIONER

## 2022-03-22 PROCEDURE — 80305 DRUG TEST PRSMV DIR OPT OBS: CPT | Performed by: NURSE PRACTITIONER

## 2022-03-22 RX ORDER — BUPRENORPHINE AND NALOXONE 8; 2 MG/1; MG/1
1 FILM, SOLUBLE BUCCAL; SUBLINGUAL 2 TIMES DAILY
Qty: 56 FILM | Refills: 0 | Status: SHIPPED | OUTPATIENT
Start: 2022-03-22 | End: 2022-04-13 | Stop reason: SDUPTHER

## 2022-03-22 RX ORDER — FLUOXETINE HYDROCHLORIDE 20 MG/1
20 CAPSULE ORAL DAILY
Qty: 30 CAPSULE | Refills: 0 | Status: SHIPPED | OUTPATIENT
Start: 2022-03-22 | End: 2022-04-13 | Stop reason: SDUPTHER

## 2022-03-22 RX ORDER — ALBUTEROL SULFATE 90 UG/1
2 AEROSOL, METERED RESPIRATORY (INHALATION) 4 TIMES DAILY PRN
Qty: 54 G | Refills: 1 | Status: SHIPPED | OUTPATIENT
Start: 2022-03-22

## 2022-03-22 NOTE — PROGRESS NOTES
[address] INTERNAL MEDICINE AND MEDICATION MANAGEMENT  Deepak Headley [facility] [place] [address]  Dept: [phone]  Dept Fax: [phone]: [phone]     Visit Date:  3/22/2022    Patient:  Chelsea Murillo  YOB: 1965    HPI:     Chief Complaint   Patient presents with    Drug Problem     Virgen Fam presents today for medical evaluation of severe opioid use disorder, anxiety/depression, weight loss, COPD, tobacco abuse, and right ear cerumen impaction     I last seen her 5 weeks ago.     She was hospitalized 12/27-1/3 for symptomatic bradycardia, carbon monoxide poisoning, altered mental status, and severe malnutrition. Had a visit with cardiology today, 30 day event monitor reviewed, and she was started on metoprolol. She is scheduled to repeat event monitor in June.     No chest pain or palpitations. BP today in office 113/60, HR 59. Scheduled to see pulmonary 3/24. PFTs completed. Severe COPD. She is a current every day smoker, interested in smoking cessation. Did well on chantix previously, but started smoking again once she finished the starter pack (did not realize that she could continue it). Insurance refuses to cover, and it is too expensive for patient to afford. CT chest completed. There are small nodules, need repeated in 6 months.      Weight is up today to 113 lbs from 99 lbs. No family history of GI cancers. No significant nausea/vomiting. No blood in her stool. No constipation/diarrhea. Hep C ordered, not yet obtained. Liver enzymes WNL. Colonoscopy 3/15.      Urges and cravings controlled with Suboxone 8 mg BID     She denies any use     Urine positive for buprenorphine and THC      Not active in counseling     Patient state that her mood has improved since stopping celexa and starting prozac. However, she feels that she is wanting to sleep a lot again. Virgen Fam also complains of decreased hearing in the right ear.  She has cerumen impaction. Irrigated without difficulty. Medications    Current Outpatient Medications:     buprenorphine-naloxone (SUBOXONE) 8-2 MG FILM SL film, Place 1 Film under the tongue 2 times daily for 28 days. , Disp: 56 Film, Rfl: 0    FLUoxetine (PROZAC) 20 MG capsule, Take 1 capsule by mouth daily, Disp: 30 capsule, Rfl: 0    albuterol sulfate HFA (VENTOLIN HFA) 108 (90 Base) MCG/ACT inhaler, Inhale 2 puffs into the lungs 4 times daily as needed for Wheezing, Disp: 54 g, Rfl: 1    buprenorphine er (SUBLOCADE) 300 MG/1.5ML SOSY injection, Inject 1.5 mLs into the skin every 28 days for 28 days. , Disp: 1.5 mL, Rfl: 1    meloxicam (MOBIC) 15 MG tablet, , Disp: , Rfl:     VITAMIN D PO, Take by mouth, Disp: , Rfl:     ZINC PO, Take by mouth, Disp: , Rfl:     metoprolol tartrate (LOPRESSOR) 25 MG tablet, Take 1 tablet by mouth 2 times daily, Disp: 180 tablet, Rfl: 1    ondansetron (ZOFRAN) 4 MG tablet, TAKE 1 TABLET BY MOUTH THREE TIMES DAILY AS NEEDED FOR NAUSEA OR  VOMITING, Disp: 30 tablet, Rfl: 0    gabapentin (NEURONTIN) 300 MG capsule, Take 300 mg by mouth 3 times daily. , Disp: , Rfl:     naloxone 4 MG/0.1ML LIQD nasal spray, 1 spray by Nasal route as needed for Opioid Reversal, Disp: 1 each, Rfl: 1    triamterene-hydrochlorothiazide (MAXZIDE) 75-50 MG per tablet, Take 1 tablet by mouth daily. , Disp: , Rfl:     Budeson-Glycopyrrol-Formoterol (BREZTRI AEROSPHERE) 160-9-4.8 MCG/ACT AERO, Inhale 2 puffs into the lungs in the morning and at bedtime, Disp: 10.7 g, Rfl: 5    The patient has No Known Allergies. Past Medical History  Virgen Fam  has a past medical history of COPD (chronic obstructive pulmonary disease) ([facility] [address] and High blood pressure. Past Surgical History  The patient  has a past surgical history that includes Tubal ligation (4-2002); hernia repair (4-2002); laparoscopy (1994?);  Wrist surgery; and cyst removal.    Family History  This patient's family history includes Cancer in her father; Emphysema in her mother; Esophageal Cancer in her father; Heart Disease in her mother. Social History  Ld Call  reports that she has been smoking cigarettes. She started smoking about 42 years ago. She has a 40.00 pack-year smoking history. She has never used smokeless tobacco. She reports previous alcohol use. She reports previous drug use. Drug: Opiates . Health Maintenance:    Health Maintenance   Topic Date Due    Hepatitis C screen  Never done    COVID-19 Vaccine (1) Never done    Pneumococcal 0-64 years Vaccine (1 of 2 - PPSV23) Never done    HIV screen  Never done    DTaP/Tdap/Td vaccine (1 - Tdap) Never done    Cervical cancer screen  Never done    Lipid screen  Never done    Colorectal Cancer Screen  Never done    Shingles Vaccine (1 of 2) Never done    Breast cancer screen  11/21/2013    Flu vaccine (Season Ended) 09/01/2022    Depression Monitoring  10/07/2022    Potassium monitoring  01/02/2023    Creatinine monitoring  01/02/2023    Low dose CT lung screening  01/05/2023    Hepatitis A vaccine  Aged Out    Hepatitis B vaccine  Aged Out    Hib vaccine  Aged Out    Meningococcal (ACWY) vaccine  Aged Out       Subjective:      Review of Systems   Constitutional: Positive for fatigue. Negative for chills, fever and unexpected weight change. HENT: Positive for ear pain and hearing loss (right). Negative for congestion, dental problem, rhinorrhea, sinus pressure, sinus pain, sore throat, tinnitus and trouble swallowing. Eyes: Negative for photophobia and visual disturbance. Respiratory: Positive for shortness of breath (on exertion). Negative for cough and wheezing. Cardiovascular: Negative for chest pain, palpitations and leg swelling. Gastrointestinal: Negative for abdominal distention, abdominal pain, blood in stool, constipation, diarrhea, nausea and vomiting. Endocrine: Negative for polydipsia, polyphagia and polyuria.    Genitourinary: Negative for difficulty urinating, dysuria, frequency, hematuria and urgency. Musculoskeletal: Positive for back pain (chronic). Negative for arthralgias and myalgias. Skin: Negative for rash and wound. Neurological: Negative for dizziness, light-headedness and headaches. Psychiatric/Behavioral: Negative for dysphoric mood and sleep disturbance. The patient is not nervous/anxious. Objective:     /60 (Site: Left Lower Arm, Position: Sitting)   Pulse 59   Ht 5' 5\" (1.651 m)   Wt 113 lb (51.3 kg)   BMI 18.80 kg/m²     Physical Exam  Vitals reviewed. Constitutional:       General: She is not in acute distress. Appearance: Normal appearance. She is not ill-appearing. HENT:      Head: Normocephalic and atraumatic. Right Ear: External ear normal. There is impacted cerumen. Left Ear: External ear normal.      Nose: Nose normal. No congestion or rhinorrhea. Mouth/Throat:      Mouth: Mucous membranes are moist.   Eyes:      Extraocular Movements: Extraocular movements intact. Conjunctiva/sclera: Conjunctivae normal.      Pupils: Pupils are equal, round, and reactive to light. Cardiovascular:      Rate and Rhythm: Normal rate and regular rhythm. Pulses: Normal pulses. Heart sounds: Normal heart sounds. No murmur heard. Pulmonary:      Effort: Pulmonary effort is normal. No respiratory distress. Breath sounds: Examination of the right-lower field reveals decreased breath sounds. Examination of the left-lower field reveals decreased breath sounds. Decreased breath sounds present. No rhonchi. Abdominal:      General: Abdomen is flat. Bowel sounds are normal. There is no distension. Palpations: Abdomen is soft. Musculoskeletal:         General: Normal range of motion. Cervical back: Normal range of motion and neck supple. Right lower leg: No edema. Left lower leg: No edema. Skin:     General: Skin is warm and dry.       Findings: No erythema, lesion or rash.   Neurological:      General: No focal deficit present. Mental Status: She is alert and oriented to person, place, and time. Psychiatric:         Mood and Affect: Mood normal.         Behavior: Behavior normal.         Thought Content: Thought content normal.         Judgment: Judgment normal.         Labs Reviewed 3/22/2022:    Lab Results   Component Value Date    WBC 8.0 12/31/2021    HGB 11.8 (L) 12/31/2021    HCT 37.2 12/31/2021     12/31/2021    ALT 13 12/28/2021    AST 22 12/28/2021     01/02/2022    K 3.6 01/02/2022     01/02/2022    CREATININE 0.8 01/02/2022    BUN 15 01/02/2022    CO2 24 01/02/2022    TSH 0.822 12/27/2021    INR 0.99 12/31/2021       Assessment/Plan      1. Severe opioid use disorder (HCC)    - POCT Rapid Drug Screen  - buprenorphine-naloxone (SUBOXONE) 8-2 MG FILM SL film; Place 1 Film under the tongue 2 times daily for 28 days. Dispense: 56 Film; Refill: 0  - buprenorphine er (SUBLOCADE) 300 MG/1.5ML SOSY injection; Inject 1.5 mLs into the skin every 28 days for 28 days. Dispense: 1.5 mL; Refill: 1  - Narcan at home  - Labs not yet obtained  - OARRS reviewed, no discrepancies  - Encouraged to attend NA/AA meetings and/or arrange for individualized counseling    2. Anxiety and depression    - FLUoxetine (PROZAC) 20 MG capsule; Take 1 capsule by mouth daily  Dispense: 30 capsule; Refill: 0    3. Weight loss    - Follow with GI as scheduled    4. Chronic obstructive pulmonary disease, unspecified COPD type ([facility])    - albuterol sulfate HFA (VENTOLIN HFA) 108 (90 Base) MCG/ACT inhaler; Inhale 2 puffs into the lungs 4 times daily as needed for Wheezing  Dispense: 54 g; Refill: 1  - Follow with pulmonary as scheduled    5. Right ear impacted cerumen    - Irrigated in office. Tolerated well      Return in about 4 weeks (around 4/19/2022). Patient given educational materials - see patient instructions.   Discussed use, benefit, and side effects of prescribed medications. All patient questions answered. Pt voiced understanding. Reviewed health maintenance.        Electronically signed ENMANUEL Rogers CNP on 3/22/22 at 11:36 AM EDT

## 2022-03-24 ENCOUNTER — OFFICE VISIT (OUTPATIENT)
Dept: PULMONOLOGY | Age: 57
End: 2022-03-24
Payer: COMMERCIAL

## 2022-03-24 VITALS
DIASTOLIC BLOOD PRESSURE: 63 MMHG | HEART RATE: 52 BPM | TEMPERATURE: 97.2 F | WEIGHT: 116 LBS | OXYGEN SATURATION: 96 % | SYSTOLIC BLOOD PRESSURE: 119 MMHG | HEIGHT: 65 IN | BODY MASS INDEX: 19.33 KG/M2

## 2022-03-24 DIAGNOSIS — J44.9 CHRONIC OBSTRUCTIVE PULMONARY DISEASE, UNSPECIFIED COPD TYPE (HCC): ICD-10-CM

## 2022-03-24 DIAGNOSIS — R53.82 CHRONIC FATIGUE: ICD-10-CM

## 2022-03-24 DIAGNOSIS — J96.11 CHRONIC RESPIRATORY FAILURE WITH HYPOXIA (HCC): ICD-10-CM

## 2022-03-24 DIAGNOSIS — R91.8 LUNG NODULES: ICD-10-CM

## 2022-03-24 DIAGNOSIS — F17.200 SMOKER: ICD-10-CM

## 2022-03-24 DIAGNOSIS — J44.9 CHRONIC OBSTRUCTIVE PULMONARY DISEASE, UNSPECIFIED COPD TYPE (HCC): Primary | ICD-10-CM

## 2022-03-24 PROCEDURE — 99205 OFFICE O/P NEW HI 60 MIN: CPT | Performed by: INTERNAL MEDICINE

## 2022-03-24 RX ORDER — BUDESONIDE, GLYCOPYRROLATE, AND FORMOTEROL FUMARATE 160; 9; 4.8 UG/1; UG/1; UG/1
2 AEROSOL, METERED RESPIRATORY (INHALATION) 2 TIMES DAILY
Qty: 10.7 G | Refills: 5 | Status: SHIPPED | OUTPATIENT
Start: 2022-03-24 | End: 2022-04-23

## 2022-03-24 ASSESSMENT — ENCOUNTER SYMPTOMS
ABDOMINAL DISTENTION: 0
COUGH: 1
WHEEZING: 1
SHORTNESS OF BREATH: 1
VOICE CHANGE: 0
ABDOMINAL PAIN: 0
ALLERGIC/IMMUNOLOGIC NEGATIVE: 1
CHEST TIGHTNESS: 1
TROUBLE SWALLOWING: 0

## 2022-03-24 NOTE — PROGRESS NOTES
Subjective:      Patient ID: Devin Rose is a 59 y.o. female. CC: COPD  HPI     Saw Dr Abena Key 10 years ago, diagnosis of COPD made  Inconsistent treatment, uses available samples from provides  Continues smoking 1 ppd, interested in smoking cessation, wishes Chantix  On Breztri and Albuterol, Breztri fist dose this am  Started smoking at age 23   Severe opioid use disorder  Recent admission to ICU with lethargy, symptomatic bradycardia, elevated CO level  SOB with minimal activity like walking to the mailbox, climbing 1 flight stairs  No cough, minimal sputum. , no hemoptysis. positive chronic fatigue, exhausted all  Unemployed, babysat grandkids  Worked for [employer] in [place]  Interested in [facility] esophageal cancer  Mom COPD---had tracheostomy   Sibling smokers, but no lung disease  Screening CT chest 1/5/22: minute lung nodules  and GGO in the lingula    Review of Systems   Constitutional: Positive for fatigue and unexpected weight change. HENT: Negative for trouble swallowing and voice change. Respiratory: Positive for cough, chest tightness, shortness of breath and wheezing. Cardiovascular: Positive for palpitations. Negative for chest pain and leg swelling. Gastrointestinal: Negative for abdominal distention and abdominal pain. Endocrine: Negative. Genitourinary: Negative. Musculoskeletal: Positive for arthralgias. Allergic/Immunologic: Negative. Neurological: Negative. Hematological: Negative. Psychiatric/Behavioral: Negative. All other systems reviewed and are negative    Lung Nodule Screening     [x] Qualifies    [] Does not qualify   [] Declined   [] Completed  Past Medical History:   Diagnosis Date    COPD (chronic obstructive pulmonary disease) ([facility] [address]     High blood pressure      Past Surgical History:   Procedure Laterality Date    CYST REMOVAL      vagina    HERNIA REPAIR  4-2002    LAPAROSCOPY  1994?     TUBAL LIGATION  4-2002    WRIST SURGERY       Social History     Tobacco Use    Smoking status: Current Every Day Smoker     Packs/day: 1.00     Years: 40.00     Pack years: 40.00     Types: Cigarettes     Start date: 1/15/1980    Smokeless tobacco: Never Used   Substance Use Topics    Alcohol use: Not Currently    Drug use: Not Currently     Types: Opiates       No Known Allergies   Family History   Problem Relation Age of Onset    Heart Disease Mother     Emphysema Mother     Esophageal Cancer Father     Cancer Father      Current Outpatient Medications   Medication Sig Dispense Refill    buprenorphine-naloxone (SUBOXONE) 8-2 MG FILM SL film Place 1 Film under the tongue 2 times daily for 28 days. 56 Film 0    FLUoxetine (PROZAC) 20 MG capsule Take 1 capsule by mouth daily 30 capsule 0    albuterol sulfate HFA (VENTOLIN HFA) 108 (90 Base) MCG/ACT inhaler Inhale 2 puffs into the lungs 4 times daily as needed for Wheezing 54 g 1    buprenorphine er (SUBLOCADE) 300 MG/1.5ML SOSY injection Inject 1.5 mLs into the skin every 28 days for 28 days. 1.5 mL 1    meloxicam (MOBIC) 15 MG tablet       VITAMIN D PO Take by mouth      ZINC PO Take by mouth      metoprolol tartrate (LOPRESSOR) 25 MG tablet Take 1 tablet by mouth 2 times daily 180 tablet 1    ondansetron (ZOFRAN) 4 MG tablet TAKE 1 TABLET BY MOUTH THREE TIMES DAILY AS NEEDED FOR NAUSEA OR  VOMITING 30 tablet 0    gabapentin (NEURONTIN) 300 MG capsule Take 300 mg by mouth 3 times daily.  naloxone 4 MG/0.1ML LIQD nasal spray 1 spray by Nasal route as needed for Opioid Reversal 1 each 1    triamterene-hydrochlorothiazide (MAXZIDE) 75-50 MG per tablet Take 1 tablet by mouth daily. No current facility-administered medications for this visit. LABS - none   There were no vitals taken for this visit.    Wt Readings from Last 3 Encounters:   03/22/22 113 lb (51.3 kg)   02/16/22 105 lb (47.6 kg)   02/16/22 105 lb 1.6 oz (47.7 kg)     Neck Circumference - 14  in; Mallampati Score - 3            Objective:   Physical Exam  Constitutional:       Appearance: Normal appearance. HENT:      Head: Normocephalic. Nose: Nose normal.   Eyes:      Pupils: Pupils are equal, round, and reactive to light. Cardiovascular:      Rate and Rhythm: Normal rate and regular rhythm. Pulmonary:      Effort: Pulmonary effort is normal.      Comments: Decreased breath sounds  Musculoskeletal:         General: Normal range of motion. Cervical back: Normal range of motion and neck supple. Skin:     General: Skin is warm. Neurological:      General: No focal deficit present. Mental Status: She is alert. Psychiatric:         Mood and Affect: Mood normal.         Behavior: Behavior normal.         Screening CT    Impression   1. 2 separate 4 mm noncalcified nodules right lung. Groundglass opacity anterior right upper lung. Multiple large centrally calcified nodule left lung   2. There are no pathologically enlarged lymph nodes. 3. LUNGRADS ASSESSMENT VALUE: 3,            The LUNG RADS RECOMMENDATIONS for monitoring lung nodules listed below (ACR- Lung-RADS Version 1.0 Assessment Categories Release date\" April 28, 2014)       LUNG RADS RECOMMENDATIONS;   1.  Normal, continue annual screening   2.  Benign appearance or behavior, continue annual screening   3.  6 month CT recommended   4A.  3 month CT recommended; may consider PET/CT   4B.  Additional diagnostics and/or tissue sampling recommended   4X.  Additional diagnostics and/or tissue sampling recommended         **This report has been created using voice recognition software.  It may contain minor errors which are inherent in voice recognition technology. **       Final report electronically signed by Dr. Slime Engel on 1/5/2022 8:52 AM     PFTs:          Six Minute Walk Test  [facility] 1965    Six minute walk test done in my office today by my medical assistant.  Sarai's oxygen saturation at rest on room air was 91%. Her oxygen saturation dropped to 83% on room air with exertion after walking 84 sec. The six minute walk test was repeated with oxygen supplementation. Oxygen supplementation was started with 1 LPM via nasal cannula and titrated to2  LPM via nasal cannula. At the end of the test [place] [facility] [facility] A Guajardo's oxygen saturation remained at 97% on 2 LPM with exertion. She is mobile in the home and requires oxygen as outlined above. Patient ambulated a total of 1188 feet with oxygen. Resting Dyspnea/Len score was 0  and 0  upon completion of the walk. Resting heart rate was  51 bpm and 52 bpm upon completing the walk. DME Medical Necessity Documentation    [place] [facility] [facility] was seen in the office on 3/24/2022 for the diagnosis COPD. I am prescribing oxygen because the diagnosis and testing requires the patient to have oxygen in the home. her condition will improve or be benefited by oxygen use. The patient is able to perform good mobility in a home setting and therefore does require the use of a portable oxygen system. Assessment:       Diagnosis Orders   1. Chronic obstructive pulmonary disease, unspecified COPD type ([address]  [facility] Medication Mgmt (Smoking Cessation - Clinical Pharmacy) - [place]    Full PFT Study With Bronchodilator    6 Minute Walk Test    Alpha-1-Antitrypsin    [facility]   2. Smoker  6 Minute Walk Test    Alpha-1-Antitrypsin    [facility]   3. Lung nodules  Alpha-1-Antitrypsin    [facility]   4. Chronic fatigue  [facility]:        Orders Placed This Encounter   Procedures    Alpha-1-Antitrypsin     Standing Status:   Future     Standing Expiration Date:   3/24/2023   [facility] Medication Mgmt (Smoking Cessation - Clinical Pharmacy) - [address] [facility]     Referral Priority:   Routine     Referral Type:    Other     Referral Reason:   Specialty Services Required     Requested Specialty:   Pharmacist     Number of Visits Requested:   1     Expiration Date:   3/24/2024   [address] [facility]     Referral Priority:   Routine     Referral Type:   Eval and Treat     Referral Reason:   Specialty Services Required     Requested Specialty:   Sleep Center     Number of Visits Requested:   1    Full PFT Study With Bronchodilator     If an ABG is needed along with this PFT procedure, please place the appropriate lab order     Standing Status:   Future     Standing Expiration Date:   3/24/2023    6 Minute Walk Test     Standing Status:   Future     Number of Occurrences:   1     Standing Expiration Date:   3/24/2023    DME Order for Home Oxygen as OP     You must complete the order parameters below and add the medical necessity documentation for this DME in a separate note.     Stationary Oxygen Concentrator at 2 lpm via Nasal Cannula    Stationary Prescribed at:  Non Continuous while walking or exercise    Portable Gaseous O2 System and contents at 2 lpm via Nasal Cannula    3-4hrs/day    Diagnosis: COPD  Length of need: 12 Months      Orders Placed This Encounter   Medications    Budeson-Glycopyrrol-Formoterol (BREZTRI AEROSPHERE) 160-9-4.8 MCG/ACT AERO     Sig: Inhale 2 puffs into the lungs in the morning and at bedtime     Dispense:  10.7 g     Refill:  5

## 2022-03-24 NOTE — PATIENT INSTRUCTIONS
Patient Education        Stopping Smoking: Care Instructions  Your Care Instructions     Cigarette smokers crave the nicotine in cigarettes. Giving it up is much harder than simply changing a habit. Your body has to stop craving the nicotine. It is hard to quit, but you can do it. There are many tools that people use to quit smoking. You may find that combining tools works best for you. There are several steps to quitting. First you get ready to quit. Then you get support to help you. After that, you learn new skills and behaviors to become a nonsmoker. For many people, a necessary step is getting and using medicine. Your doctor will help you set up the plan that best meets your needs. You may want to attend a smoking cessation program to help you quit smoking. When you choose a program, look for one that has proven success. Ask your doctor for ideas. You will greatly increase your chances of success if you take medicine as well as get counseling or join a cessation program.  Some of the changes you feel when you first quit tobacco are uncomfortable. Your body will miss the nicotine at first, and you may feel short-tempered and grumpy. You may have trouble sleeping or concentrating. Medicine can help you deal with these symptoms. You may struggle with changing your smoking habits and rituals. The last step is the tricky one: Be prepared for the smoking urge to continue for a time. This is a lot to deal with, but keep at it. You will feel better. Follow-up care is a key part of your treatment and safety. Be sure to make and go to all appointments, and call your doctor if you are having problems. It's also a good idea to know your test results and keep a list of the medicines you take. How can you care for yourself at home? · Ask your family, friends, and coworkers for support. You have a better chance of quitting if you have help and support.   · Join a support group, such as Nicotine Anonymous, for people who are trying to quit smoking. · Consider signing up for a smoking cessation program, such as the American Lung Association's Freedom from Smoking program.  · Get text messaging support. Go to the website at www.smokefree. gov to sign up for the [facility] program.  · Set a quit date. Pick your date carefully so that it is not right in the middle of a big deadline or stressful time. Once you quit, do not even take a puff. Get rid of all ashtrays and lighters after your last cigarette. Clean your house and your clothes so that they do not smell of smoke. · Learn how to be a nonsmoker. Think about ways you can avoid those things that make you reach for a cigarette. ? Avoid situations that put you at greatest risk for smoking. For some people, it is hard to have a drink with friends without smoking. For others, they might skip a coffee break with coworkers who smoke. ? Change your daily routine. Take a different route to work or eat a meal in a different place. · Cut down on stress. Calm yourself or release tension by doing an activity you enjoy, such as reading a book, taking a hot bath, or gardening. · Talk to your doctor or pharmacist about nicotine replacement therapy, which replaces the nicotine in your body. You still get nicotine but you do not use tobacco. Nicotine replacement products help you slowly reduce the amount of nicotine you need. These products come in several forms, many of them available over-the-counter:  ? Nicotine patches  ? Nicotine gum and lozenges  ? Nicotine inhaler  · Ask your doctor about bupropion (Wellbutrin) or varenicline (Chantix), which are prescription medicines. They do not contain nicotine. They help you by reducing withdrawal symptoms, such as stress and anxiety. · Some people find hypnosis, acupuncture, and massage helpful for ending the smoking habit. · Eat a healthy diet and get regular exercise.  Having healthy habits will help your body move past its craving for nicotine. · Be prepared to keep trying. Most people are not successful the first few times they try to quit. Do not get mad at yourself if you smoke again. Make a list of things you learned and think about when you want to try again, such as next week, next month, or next year. Where can you learn more? Go to https://Mobile [employer]sarahi[employer]. org and sign in to your [employer] account. Enter V658 in the Gorb box to learn more about \"Stopping Smoking: Care Instructions. \"     If you do not have an account, please click on the \"Sign Up Now\" link. Current as of: February 11, 2021               Content Version: 13.1  © 2006-2021 Healthwise, Incorporated. Care instructions adapted under license by [facility] ([facility]). If you have questions about a medical condition or this instruction, always ask your healthcare professional. [address] any warranty or liability for your use of this information.

## 2022-03-25 PROCEDURE — 94618 PULMONARY STRESS TESTING: CPT | Performed by: INTERNAL MEDICINE

## 2022-03-30 ENCOUNTER — TELEPHONE (OUTPATIENT)
Dept: PHARMACY | Age: 57
End: 2022-03-30

## 2022-03-30 NOTE — TELEPHONE ENCOUNTER
Referral to [facility]. [facility] received from Dr. Eboni Pacheco for Smoking Cessation Counseling and Medication Management per Consult Agreement. Called patient, left message with instructions for patient to call the clinic back at [phone], option 2.

## 2022-03-30 NOTE — TELEPHONE ENCOUNTER
Pt called back regarding smoking cessation referral.    I spoke with pt and explained our 12 week smoking cessation program and pt is interested in starting the program.  We set up patients first appointment for 4/13/22 at 1130am to be done in the office. Pt notified to come to the Medication Management Department located in [facility] on the first floor of [address] [address]. Pt had no questions at this time.

## 2022-04-02 ASSESSMENT — ENCOUNTER SYMPTOMS
SORE THROAT: 0
SINUS PRESSURE: 0
COUGH: 0
SHORTNESS OF BREATH: 1
NAUSEA: 0
WHEEZING: 0
VOMITING: 0
DIARRHEA: 0
ABDOMINAL PAIN: 0
PHOTOPHOBIA: 0
BACK PAIN: 1
SINUS PAIN: 0
ABDOMINAL DISTENTION: 0
BLOOD IN STOOL: 0
TROUBLE SWALLOWING: 0
RHINORRHEA: 0
CONSTIPATION: 0

## 2022-04-13 ENCOUNTER — HOSPITAL ENCOUNTER (OUTPATIENT)
Dept: PHARMACY | Age: 57
Setting detail: THERAPIES SERIES
Discharge: HOME | End: 2022-04-13
Payer: COMMERCIAL

## 2022-04-13 ENCOUNTER — OFFICE VISIT (OUTPATIENT)
Dept: INTERNAL MEDICINE CLINIC | Age: 57
End: 2022-04-13
Payer: COMMERCIAL

## 2022-04-13 VITALS
HEART RATE: 61 BPM | BODY MASS INDEX: 18.16 KG/M2 | SYSTOLIC BLOOD PRESSURE: 108 MMHG | HEIGHT: 65 IN | WEIGHT: 109 LBS | DIASTOLIC BLOOD PRESSURE: 61 MMHG

## 2022-04-13 DIAGNOSIS — F11.20 SEVERE OPIOID USE DISORDER (HCC): Primary | ICD-10-CM

## 2022-04-13 DIAGNOSIS — R53.83 FATIGUE, UNSPECIFIED TYPE: ICD-10-CM

## 2022-04-13 DIAGNOSIS — F41.9 ANXIETY AND DEPRESSION: ICD-10-CM

## 2022-04-13 DIAGNOSIS — F32.A ANXIETY AND DEPRESSION: ICD-10-CM

## 2022-04-13 PROCEDURE — 99214 OFFICE O/P EST MOD 30 MIN: CPT | Performed by: NURSE PRACTITIONER

## 2022-04-13 PROCEDURE — 99212 OFFICE O/P EST SF 10 MIN: CPT | Performed by: PHARMACIST

## 2022-04-13 PROCEDURE — 80305 DRUG TEST PRSMV DIR OPT OBS: CPT | Performed by: NURSE PRACTITIONER

## 2022-04-13 RX ORDER — FLUOXETINE HYDROCHLORIDE 40 MG/1
40 CAPSULE ORAL DAILY
Qty: 30 CAPSULE | Refills: 1 | Status: SHIPPED | OUTPATIENT
Start: 2022-04-13 | End: 2022-06-15

## 2022-04-13 RX ORDER — CAFFEINE 200 MG
200 TABLET ORAL 3 TIMES DAILY PRN
COMMUNITY
End: 2022-10-10

## 2022-04-13 RX ORDER — GABAPENTIN 300 MG/1
300 CAPSULE ORAL 3 TIMES DAILY
Qty: 90 CAPSULE | Refills: 1 | Status: SHIPPED | OUTPATIENT
Start: 2022-04-13 | End: 2022-06-22

## 2022-04-13 RX ORDER — BUPRENORPHINE AND NALOXONE 8; 2 MG/1; MG/1
1 FILM, SOLUBLE BUCCAL; SUBLINGUAL 2 TIMES DAILY
Qty: 56 FILM | Refills: 0 | Status: SHIPPED | OUTPATIENT
Start: 2022-04-13 | End: 2022-05-12 | Stop reason: SDUPTHER

## 2022-04-13 RX ORDER — M-VIT,TX,IRON,MINS/CALC/FOLIC 27MG-0.4MG
1 TABLET ORAL DAILY
COMMUNITY

## 2022-04-13 NOTE — PROGRESS NOTES
Medication Management   [facility]. [facility]  Sm[facility]  [phone] (phone)  [phone] (fax)    Yariel James is a 59 y.o. female has been referred to our service by Dr. Nicolás Marley for Smoking Cessation Counseling and Medication Management per Consult Agreement. Patient acknowledges working in consult agreement with clinical pharmacist and referring physician. Pt reports they are ready and motivated to quit. History:  Family/friends for support: not sure.  and kids smoke. Career: no   Home environment/smoke free zones in house: Yes. Smokes in garage. Past Medical history/diagnosis reviewed:  Yes  Medication list reviewed: Yes    Past Medical History:      Past Medical History:   Diagnosis Date    COPD (chronic obstructive pulmonary disease) ([address]     High blood pressure          Scaling:  Importance level: 8 - \"because of lungs\"  Confidence level: 8    Fagerstrom Test:    How soon after you wake up do you smoke your first cigarette?   within 5\"(3)   Do you find it difficult to refrain from smoking in places where it is forbidden, e.g., in Religion, at [place], [address] [place] [address]? No (1 if yes)   Which cigarette would you hate to most give up? the first one in the morning(1)   How many cigarettes per day do you smoke? 11-20(1)    Do you smoke more frequently during the first hours of waking than during the rest of the day? Yes (1 if yes)   Do you smoke if you are so ill that you are in bed most of the day? No (1 if yes)    Classification of Dependence:  0-2 Very Low  3-4 Low  5    Moderate  6-7 High  8-10 Very High   Score: 6, I went over the results with the pt. Tobacco use:  Current use:  Type: Methol, #/packs per day: 0.75 since Jan, prior to that 2 packs per day,  Age started: 23   Years used: 39  Pack years: 39  How many times in the past have you made a serious attempt to quit smoking? A couple years  What was the longest period of time you were able to quit smoking?  Over a month the last time  When was your most recent quit attempt and for how long were you quit? A couple years  What methods have you used in the past when you tried to quit smoking? () cold turkey, () taper down, () hypnosis, () acupuncture, () medication, () counseling, () other   The pt has used the following medications in the past to assist with tobacco cessation. Nicotine Patch - No   Nicotine Gum - No   Nicotine Inhaler - No   Nicotine Lozenge - No   Zyban - Yes - took Wellbutrin, didn't agree with her   Chantix - Yes - has used before, but didn't know to finish the whole 12 weeks, and went back to smoking. Wants to use this again. Second hand smoke exposure:  Where are you exposed to second hand smoke? (x) home, () work, () social events, (x) vehicle, () live with another smoker, () not exposed, () other   Does your partner smoke? Yes  If yes, are they interested in quitting smoking? No  Are they supportive of your quitting smoking? Yes  Do you live with your partner? Yes      ASSESSMENT/PLAN:  Quit date set? No      Reviewed options for pharmacological therapy including directions for use, benefits, and possible side effects    Best option with currently available products is Nicotine patch and Lozenge. Pt really wants to use Chantix. Discussed with pt that Chantix is currently unavailable with no release date due to recall. Pt focused on Chantix as her smoking cessation aid. After conversation, pt states \"I'm just not ready to quit smoking\". Not sure if she wants to use patch/lozenge. Wants to use Chantix, so that she can gradually decrease use. Pt states not ready to say \"I'm not going to smoke\". Not interested in group class, too far to drive and has to watch [employer] Standard written patient education provided to pt:  Pt given Smoking Cessation education booklet? Yes  Pt given a \"1-800-QUIT-NOW\" card? Yes      Pt not ready to schedule next appt.    Wants to think about options, hopes that Chantix will come available. Discussed with pt, will plan to call her in 3-4 week to reassess her readiness to quit smoking. Boris Fallen verbalized understanding of the above information and denied further questions or concerns. Charges dropped? Yes      The total time spent with the pt was 45 minutes. For Ismael Herrera in place:   Yes   Recommendation Provided To: Patient/Caregiver: 1 via In person   Intervention Detail: Adherence Monitorin   Gap Closed?: No    Intervention Accepted By: Patient/Caregiver: 1   Time Spent (min): 45

## 2022-04-13 NOTE — PROGRESS NOTES
[address] INTERNAL MEDICINE AND MEDICATION MANAGEMENT  [facility] [place] [address]  Dept: [phone]  Dept Fax: [phone]: [phone]     Visit Date:  4/13/2022    Patient:  Kristine Singh  YOB: 1965    HPI:     Chief Complaint   Patient presents with    Drug Problem     Jannet Jones presents today for medical evaluation of severe opioid use disorder, anxiety/depression, weight loss, COPD, tobacco abuse, chronic pain     I last seen her 3 weeks ago.     She was hospitalized 12/27-1/3 for symptomatic bradycardia, carbon monoxide poisoning, altered mental status, and severe malnutrition. Had a visit with cardiology today, 30 day event monitor reviewed, and she was started on metoprolol. She is scheduled to repeat event monitor in June.     No chest pain or palpitations. BP today in office 108/61, HR 59. Scheduled to see pulmonary 3/24. PFTs completed. Severe COPD. She is a current every day smoker, interested in smoking cessation. Did well on chantix previously, but started smoking again once she finished the starter pack (did not realize that she could continue it). Insurance refuses to cover, and it is too expensive for patient to afford. CT chest completed. There are small nodules, need repeated in 6 months. Failed 6 min walk test, is to be on 2L oxygen via nasal cannula with activity.      Weight is up today to 109 lbs from 80 lbs. No family history of GI cancers. No significant nausea/vomiting. No blood in her stool. No constipation/diarrhea. Hep C ordered, not yet obtained. Liver enzymes WNL. Colonoscopy 3/15.  EGD is scheduled soon.      Urges and cravings controlled with Suboxone 8 mg BID     She denies any use     Urine positive for buprenorphine and THC      Not active in counseling     Patient state that her mood has improved since stopping celexa and starting prozac. However, she feels that she is wanting to sleep a lot again.      Chronic pain not well controlled, will start gabapentin    Medications    Current Outpatient Medications:     gabapentin (NEURONTIN) 300 MG capsule, Take 1 capsule by mouth 3 times daily for 60 days. , Disp: 90 capsule, Rfl: 1    buprenorphine-naloxone (SUBOXONE) 8-2 MG FILM SL film, Place 1 Film under the tongue 2 times daily for 28 days. , Disp: 56 Film, Rfl: 0    FLUoxetine (PROZAC) 40 MG capsule, Take 1 capsule by mouth daily, Disp: 30 capsule, Rfl: 1    Multiple Vitamins-Minerals (THERAPEUTIC MULTIVITAMIN-MINERALS) tablet, Take 1 tablet by mouth daily, Disp: , Rfl:     Caffeine (VIVARIN) 200 MG TABS, Take 200 mg by mouth 3 times daily as needed for Other (OTC for drowsiness), Disp: , Rfl:     Budeson-Glycopyrrol-Formoterol (BREZTRI AEROSPHERE) 160-9-4.8 MCG/ACT AERO, Inhale 2 puffs into the lungs in the morning and at bedtime, Disp: 10.7 g, Rfl: 5    albuterol sulfate HFA (VENTOLIN HFA) 108 (90 Base) MCG/ACT inhaler, Inhale 2 puffs into the lungs 4 times daily as needed for Wheezing, Disp: 54 g, Rfl: 1    meloxicam (MOBIC) 15 MG tablet, Take 15 mg by mouth daily , Disp: , Rfl:     metoprolol tartrate (LOPRESSOR) 25 MG tablet, Take 1 tablet by mouth 2 times daily, Disp: 180 tablet, Rfl: 1    ondansetron (ZOFRAN) 4 MG tablet, TAKE 1 TABLET BY MOUTH THREE TIMES DAILY AS NEEDED FOR NAUSEA OR  VOMITING, Disp: 30 tablet, Rfl: 0    naloxone 4 MG/0.1ML LIQD nasal spray, 1 spray by Nasal route as needed for Opioid Reversal, Disp: 1 each, Rfl: 1    triamterene-hydrochlorothiazide (MAXZIDE) 75-50 MG per tablet, Take 1 tablet by mouth daily. , Disp: , Rfl:     The patient has No Known Allergies. Past Medical History  [employer]  has a past medical history of COPD (chronic obstructive pulmonary disease) ([facility] [address] and High blood pressure. Past Surgical History  The patient  has a past surgical history that includes Tubal ligation (4-2002); hernia repair (4-2002); laparoscopy (1994?);  Wrist surgery; and cyst removal.    Family History  This patient's family history includes Cancer in her father; Emphysema in her mother; Esophageal Cancer in her father; Heart Disease in her mother. Social History  [employer]  reports that she has been smoking cigarettes. She started smoking about 42 years ago. She has a 40.00 pack-year smoking history. She has never used smokeless tobacco. She reports previous alcohol use. She reports previous drug use. Drug: Opiates . Health Maintenance:    Health Maintenance   Topic Date Due    COVID-19 Vaccine (1) Never done    Pneumococcal 0-64 years Vaccine (1 - PCV) Never done    HIV screen  Never done    Hepatitis C screen  Never done    DTaP/Tdap/Td vaccine (1 - Tdap) Never done    Cervical cancer screen  Never done    Lipids  Never done    Colorectal Cancer Screen  Never done    Shingles Vaccine (1 of 2) Never done    Breast cancer screen  11/21/2013    Flu vaccine (Season Ended) 09/01/2022    Depression Monitoring  10/07/2022    Potassium  01/02/2023    Creatinine  01/02/2023    Low dose CT lung screening  01/05/2023    Hepatitis A vaccine  Aged Out    Hepatitis B vaccine  Aged Out    Hib vaccine  Aged Out    Meningococcal (ACWY) vaccine  Aged Out       Subjective:      Review of Systems   Constitutional: Positive for fatigue. Negative for chills, fever and unexpected weight change. HENT: Negative for congestion, dental problem, ear pain, hearing loss, rhinorrhea, sinus pressure, sinus pain, sore throat, tinnitus and trouble swallowing. Eyes: Negative for photophobia and visual disturbance. Respiratory: Positive for shortness of breath (on exertion). Negative for cough and wheezing. Cardiovascular: Negative for chest pain, palpitations and leg swelling. Gastrointestinal: Negative for abdominal distention, abdominal pain, blood in stool, constipation, diarrhea, nausea and vomiting. Endocrine: Negative for polydipsia, polyphagia and polyuria.    Genitourinary: Negative for difficulty urinating, dysuria, frequency, hematuria and urgency. Musculoskeletal: Positive for back pain (chronic). Negative for arthralgias and myalgias. Skin: Negative for rash and wound. Neurological: Negative for dizziness, light-headedness and headaches. Psychiatric/Behavioral: Negative for dysphoric mood and sleep disturbance. The patient is not nervous/anxious. Objective:     /61 (Site: Left Lower Arm, Position: Sitting)   Pulse 61   Ht 5' 5\" (1.651 m)   Wt 109 lb (49.4 kg)   BMI 18.14 kg/m²     Physical Exam  Vitals reviewed. Constitutional:       General: She is not in acute distress. Appearance: Normal appearance. She is not ill-appearing. HENT:      Head: Normocephalic and atraumatic. Right Ear: External ear normal.      Left Ear: External ear normal.      Nose: Nose normal. No congestion or rhinorrhea. Mouth/Throat:      Mouth: Mucous membranes are moist.   Eyes:      Extraocular Movements: Extraocular movements intact. Conjunctiva/sclera: Conjunctivae normal.      Pupils: Pupils are equal, round, and reactive to light. Cardiovascular:      Rate and Rhythm: Normal rate and regular rhythm. Pulses: Normal pulses. Heart sounds: Normal heart sounds. No murmur heard. Pulmonary:      Effort: Pulmonary effort is normal. No respiratory distress. Breath sounds: Examination of the right-lower field reveals decreased breath sounds. Examination of the left-lower field reveals decreased breath sounds. Decreased breath sounds present. No rhonchi. Abdominal:      General: Abdomen is flat. Bowel sounds are normal. There is no distension. Palpations: Abdomen is soft. Musculoskeletal:         General: Normal range of motion. Cervical back: Normal range of motion and neck supple. Right lower leg: No edema. Left lower leg: No edema. Skin:     General: Skin is warm and dry. Findings: No erythema, lesion or rash. Neurological:      General: No focal deficit present. Mental Status: She is alert and oriented to person, place, and time. Psychiatric:         Mood and Affect: Mood normal.         Behavior: Behavior normal.         Thought Content: Thought content normal.         Judgment: Judgment normal.         Labs Reviewed 4/13/2022:    Lab Results   Component Value Date    WBC 8.0 12/31/2021    HGB 11.8 (L) 12/31/2021    HCT 37.2 12/31/2021     12/31/2021    ALT 13 12/28/2021    AST 22 12/28/2021     01/02/2022    K 3.6 01/02/2022     01/02/2022    CREATININE 0.8 01/02/2022    BUN 15 01/02/2022    CO2 24 01/02/2022    TSH 0.822 12/27/2021    INR 0.99 12/31/2021       Assessment/Plan      1. Severe opioid use disorder (HCC)    - POCT Rapid Drug Screen  - buprenorphine-naloxone (SUBOXONE) 8-2 MG FILM SL film; Place 1 Film under the tongue 2 times daily for 28 days. Dispense: 56 Film; Refill: 0  - Narcan at home  - Labs not yet obtained  - OARRS reviewed, no discrepancies  - Encouraged to attend NA/AA meetings and/or arrange for individualized counseling    2. Anxiety and depression    - FLUoxetine (PROZAC) 40 MG capsule; Take 1 capsule by mouth daily  Dispense: 30 capsule; Refill: 1    3. Fatigue, unspecified type    - CBC; Future  - Comprehensive Metabolic Panel; Future  - TSH With Reflex Ft4; Future  - Hemoglobin A1C; Future      Return in about 4 weeks (around 5/11/2022). Patient given educational materials - see patient instructions. Discussed use, benefit, and side effects of prescribed medications. All patient questions answered. Pt voiced understanding. Reviewed health maintenance.        Electronically signed ENMANUEL Molina - CNP on 4/13/22 at 1:32 PM EDT

## 2022-04-23 ASSESSMENT — ENCOUNTER SYMPTOMS
TROUBLE SWALLOWING: 0
SHORTNESS OF BREATH: 1
SINUS PRESSURE: 0
COUGH: 0
NAUSEA: 0
DIARRHEA: 0
ABDOMINAL PAIN: 0
PHOTOPHOBIA: 0
SINUS PAIN: 0
VOMITING: 0
CONSTIPATION: 0
SORE THROAT: 0
RHINORRHEA: 0
ABDOMINAL DISTENTION: 0
WHEEZING: 0
BACK PAIN: 1
BLOOD IN STOOL: 0

## 2022-05-04 ENCOUNTER — TELEPHONE (OUTPATIENT)
Dept: PHARMACY | Age: 57
End: 2022-05-04

## 2022-05-04 NOTE — TELEPHONE ENCOUNTER
Following up with pt at this time for smoking cessation program.  PT was first seen on 4/13 and that note says we will get back to pt in 3-4 weeks to see if pt wanting to do program.  There was no answer so I left a message asking the pt to call us back at [phone], option 2.

## 2022-05-11 NOTE — TELEPHONE ENCOUNTER
Calling pt again at this time. There was no answer so I left a message asking pt to call us back at [phone], option 2.

## 2022-05-12 ENCOUNTER — OFFICE VISIT (OUTPATIENT)
Dept: INTERNAL MEDICINE CLINIC | Age: 57
End: 2022-05-12
Payer: COMMERCIAL

## 2022-05-12 VITALS
RESPIRATION RATE: 20 BRPM | HEART RATE: 75 BPM | HEIGHT: 65 IN | TEMPERATURE: 97.7 F | BODY MASS INDEX: 17.73 KG/M2 | SYSTOLIC BLOOD PRESSURE: 145 MMHG | WEIGHT: 106.4 LBS | DIASTOLIC BLOOD PRESSURE: 74 MMHG

## 2022-05-12 DIAGNOSIS — R11.0 NAUSEA: ICD-10-CM

## 2022-05-12 DIAGNOSIS — F11.20 SEVERE OPIOID USE DISORDER (HCC): Primary | ICD-10-CM

## 2022-05-12 DIAGNOSIS — I10 HYPERTENSION, UNSPECIFIED TYPE: ICD-10-CM

## 2022-05-12 DIAGNOSIS — J44.9 CHRONIC OBSTRUCTIVE PULMONARY DISEASE, UNSPECIFIED COPD TYPE (HCC): ICD-10-CM

## 2022-05-12 DIAGNOSIS — G89.4 CHRONIC PAIN SYNDROME: ICD-10-CM

## 2022-05-12 PROCEDURE — 99213 OFFICE O/P EST LOW 20 MIN: CPT | Performed by: NURSE PRACTITIONER

## 2022-05-12 PROCEDURE — 80305 DRUG TEST PRSMV DIR OPT OBS: CPT | Performed by: NURSE PRACTITIONER

## 2022-05-12 RX ORDER — BUPRENORPHINE AND NALOXONE 8; 2 MG/1; MG/1
1 FILM, SOLUBLE BUCCAL; SUBLINGUAL 2 TIMES DAILY
Qty: 56 FILM | Refills: 0 | Status: SHIPPED | OUTPATIENT
Start: 2022-05-12 | End: 2022-06-08 | Stop reason: SDUPTHER

## 2022-05-12 RX ORDER — ONDANSETRON 4 MG/1
TABLET, FILM COATED ORAL
Qty: 30 TABLET | Refills: 0 | Status: SHIPPED | OUTPATIENT
Start: 2022-05-12 | End: 2022-06-15

## 2022-05-12 RX ORDER — TRIAMTERENE AND HYDROCHLOROTHIAZIDE 75; 50 MG/1; MG/1
1 TABLET ORAL DAILY
Qty: 30 TABLET | Refills: 1 | Status: SHIPPED | OUTPATIENT
Start: 2022-05-12 | End: 2022-07-21

## 2022-05-12 RX ORDER — MELOXICAM 15 MG/1
15 TABLET ORAL DAILY
Qty: 30 TABLET | Refills: 1 | Status: SHIPPED | OUTPATIENT
Start: 2022-05-12 | End: 2022-07-18

## 2022-05-12 NOTE — PROGRESS NOTES
prescribed opiates by her PCP for several years for chronic pain issues. She has chronic back pain reportedly. Is scheduled to follow up with pain management. Mobic is helpful.     Medications    Current Outpatient Medications:     buprenorphine-naloxone (SUBOXONE) 8-2 MG FILM SL film, Place 1 Film under the tongue 2 times daily for 28 days. , Disp: 56 Film, Rfl: 0    meloxicam (MOBIC) 15 MG tablet, Take 1 tablet by mouth daily, Disp: 30 tablet, Rfl: 1    triamterene-hydroCHLOROthiazide (MAXZIDE) 75-50 MG per tablet, Take 1 tablet by mouth daily, Disp: 30 tablet, Rfl: 1    ondansetron (ZOFRAN) 4 MG tablet, TAKE 1 TABLET BY MOUTH THREE TIMES DAILY AS NEEDED FOR NAUSEA OR  VOMITING, Disp: 30 tablet, Rfl: 0    Multiple Vitamins-Minerals (THERAPEUTIC MULTIVITAMIN-MINERALS) tablet, Take 1 tablet by mouth daily, Disp: , Rfl:     Caffeine (VIVARIN) 200 MG TABS, Take 200 mg by mouth 3 times daily as needed for Other (OTC for drowsiness), Disp: , Rfl:     gabapentin (NEURONTIN) 300 MG capsule, Take 1 capsule by mouth 3 times daily for 60 days. , Disp: 90 capsule, Rfl: 1    FLUoxetine (PROZAC) 40 MG capsule, Take 1 capsule by mouth daily, Disp: 30 capsule, Rfl: 1    albuterol sulfate HFA (VENTOLIN HFA) 108 (90 Base) MCG/ACT inhaler, Inhale 2 puffs into the lungs 4 times daily as needed for Wheezing, Disp: 54 g, Rfl: 1    metoprolol tartrate (LOPRESSOR) 25 MG tablet, Take 1 tablet by mouth 2 times daily, Disp: 180 tablet, Rfl: 1    naloxone 4 MG/0.1ML LIQD nasal spray, 1 spray by Nasal route as needed for Opioid Reversal, Disp: 1 each, Rfl: 1    The patient has No Known Allergies. Past Medical History  Jamie Pearson  has a past medical history of COPD (chronic obstructive pulmonary disease) ([address] and High blood pressure. Past Surgical History  The patient  has a past surgical history that includes Tubal ligation (4-2002); hernia repair (4-2002); laparoscopy (1994?);  Wrist surgery; and cyst removal.    Family History  This patient's family history includes Cancer in her father; Emphysema in her mother; Esophageal Cancer in her father; Heart Disease in her mother. Social History  Laly Vargas  reports that she has been smoking cigarettes. She started smoking about 42 years ago. She has a 40.00 pack-year smoking history. She has never used smokeless tobacco. She reports previous alcohol use. She reports previous drug use. Drug: Opiates . Health Maintenance:    Health Maintenance   Topic Date Due    COVID-19 Vaccine (1) Never done    Pneumococcal 0-64 years Vaccine (1 - PCV) Never done    HIV screen  Never done    Hepatitis C screen  Never done    DTaP/Tdap/Td vaccine (1 - Tdap) Never done    Cervical cancer screen  Never done    Lipids  Never done    Colorectal Cancer Screen  Never done    Shingles vaccine (1 of 2) Never done    Breast cancer screen  11/21/2013    Flu vaccine (Season Ended) 09/01/2022    Depression Monitoring  10/07/2022    Low dose CT lung screening  01/05/2023    Hepatitis A vaccine  Aged Out    Hepatitis B vaccine  Aged Out    Hib vaccine  Aged Out    Meningococcal (ACWY) vaccine  Aged Out       Subjective:      Review of Systems   Constitutional: Positive for fatigue. Negative for chills, fever and unexpected weight change. HENT: Negative for congestion, dental problem, ear pain, hearing loss, rhinorrhea, sinus pressure, sinus pain, sore throat, tinnitus and trouble swallowing. Eyes: Negative for photophobia and visual disturbance. Respiratory: Positive for shortness of breath (on exertion). Negative for cough and wheezing. Cardiovascular: Negative for chest pain, palpitations and leg swelling. Gastrointestinal: Negative for abdominal distention, abdominal pain, blood in stool, constipation, diarrhea, nausea and vomiting. Endocrine: Negative for polydipsia, polyphagia and polyuria.    Genitourinary: Negative for difficulty urinating, dysuria, frequency, hematuria and urgency. Musculoskeletal: Positive for back pain (chronic). Negative for arthralgias and myalgias. Skin: Negative for rash and wound. Neurological: Negative for dizziness, light-headedness and headaches. Psychiatric/Behavioral: Negative for dysphoric mood and sleep disturbance. The patient is not nervous/anxious. Objective:     BP (!) 145/74 (Site: Right Lower Arm, Position: Sitting)   Pulse 75   Temp 97.7 °F (36.5 °C) (Tympanic)   Resp 20   Ht 5' 5\" (1.651 m)   Wt 106 lb 6.4 oz (48.3 kg)   BMI 17.71 kg/m²     Physical Exam  Vitals reviewed. Constitutional:       General: She is not in acute distress. Appearance: Normal appearance. She is not ill-appearing. HENT:      Head: Normocephalic and atraumatic. Right Ear: External ear normal.      Left Ear: External ear normal.      Nose: Nose normal. No congestion or rhinorrhea. Mouth/Throat:      Mouth: Mucous membranes are moist.   Eyes:      Extraocular Movements: Extraocular movements intact. Conjunctiva/sclera: Conjunctivae normal.      Pupils: Pupils are equal, round, and reactive to light. Cardiovascular:      Rate and Rhythm: Normal rate and regular rhythm. Pulses: Normal pulses. Heart sounds: Normal heart sounds. No murmur heard. Pulmonary:      Effort: Pulmonary effort is normal. No respiratory distress. Breath sounds: Examination of the right-lower field reveals decreased breath sounds. Examination of the left-lower field reveals decreased breath sounds. Decreased breath sounds present. No rhonchi. Abdominal:      General: Abdomen is flat. Bowel sounds are normal. There is no distension. Palpations: Abdomen is soft. Musculoskeletal:         General: Normal range of motion. Cervical back: Normal range of motion and neck supple. Right lower leg: No edema. Left lower leg: No edema. Skin:     General: Skin is warm and dry. Findings: No erythema, lesion or rash. Neurological:      General: No focal deficit present. Mental Status: She is alert and oriented to person, place, and time. Psychiatric:         Mood and Affect: Mood normal.         Behavior: Behavior normal.         Thought Content: Thought content normal.         Judgment: Judgment normal.         Labs Reviewed 5/12/2022:    Lab Results   Component Value Date    WBC 8.0 12/31/2021    HGB 11.8 (L) 12/31/2021    HCT 37.2 12/31/2021     12/31/2021    ALT 13 12/28/2021    AST 22 12/28/2021     01/02/2022    K 3.6 01/02/2022     01/02/2022    CREATININE 0.8 01/02/2022    BUN 15 01/02/2022    CO2 24 01/02/2022    TSH 0.822 12/27/2021    INR 0.99 12/31/2021       Assessment/Plan      1. Severe opioid use disorder (HCC)    - POCT Rapid Drug Screen  - buprenorphine-naloxone (SUBOXONE) 8-2 MG FILM SL film; Place 1 Film under the tongue 2 times daily for 28 days. Dispense: 56 Film; Refill: 0  - Narcan at home  - Labs not yet obtained  - OARRS reviewed, no discrepancies  - Encouraged to attend NA/AA meetings and/or arrange for individualized counseling    2. Chronic pain syndrome    - meloxicam (MOBIC) 15 MG tablet; Take 1 tablet by mouth daily  Dispense: 30 tablet; Refill: 1    3. Hypertension, unspecified type    - triamterene-hydroCHLOROthiazide (MAXZIDE) 75-50 MG per tablet; Take 1 tablet by mouth daily  Dispense: 30 tablet; Refill: 1    4. Chronic obstructive pulmonary disease, unspecified COPD type ([facility]    - Follow with pulmonary as scheduled    5. Nausea    - ondansetron (ZOFRAN) 4 MG tablet; TAKE 1 TABLET BY MOUTH THREE TIMES DAILY AS NEEDED FOR NAUSEA OR  VOMITING  Dispense: 30 tablet; Refill: 0  - Follow with GI as scheduled      Return in about 4 weeks (around 6/9/2022). Patient given educational materials - see patient instructions. Discussed use, benefit, and side effects of prescribed medications. All patient questions answered. Pt voiced understanding.   Reviewed health maintenance.        Electronically signed ENMANUEL Jain CNP on 5/12/22 at 3:57 PM EDT

## 2022-05-18 NOTE — TELEPHONE ENCOUNTER
Calling pt again at this time regarding smoking cessation program.  There was no answer so I left a message asking pt to call us back at [phone], option 2. If no return calls received from pt we will consider pt lost to follow up.

## 2022-05-24 ENCOUNTER — TELEPHONE (OUTPATIENT)
Dept: INTERNAL MEDICINE CLINIC | Age: 57
End: 2022-05-24

## 2022-05-25 ASSESSMENT — ENCOUNTER SYMPTOMS
CONSTIPATION: 0
NAUSEA: 0
WHEEZING: 0
SHORTNESS OF BREATH: 1
BACK PAIN: 1
ABDOMINAL DISTENTION: 0
DIARRHEA: 0
TROUBLE SWALLOWING: 0
SINUS PAIN: 0
PHOTOPHOBIA: 0
SORE THROAT: 0
VOMITING: 0
BLOOD IN STOOL: 0
COUGH: 0
RHINORRHEA: 0
SINUS PRESSURE: 0
ABDOMINAL PAIN: 0

## 2022-06-08 ENCOUNTER — OFFICE VISIT (OUTPATIENT)
Dept: INTERNAL MEDICINE CLINIC | Age: 57
End: 2022-06-08
Payer: COMMERCIAL

## 2022-06-08 VITALS
TEMPERATURE: 96.6 F | DIASTOLIC BLOOD PRESSURE: 61 MMHG | WEIGHT: 108 LBS | HEIGHT: 65 IN | BODY MASS INDEX: 17.99 KG/M2 | SYSTOLIC BLOOD PRESSURE: 107 MMHG | HEART RATE: 57 BPM

## 2022-06-08 DIAGNOSIS — Z51.81 ENCOUNTER FOR MONITORING SUBOXONE MAINTENANCE THERAPY: ICD-10-CM

## 2022-06-08 DIAGNOSIS — F11.21 SEVERE OPIOID USE DISORDER, IN SUSTAINED REMISSION, ON MAINTENANCE THERAPY (HCC): Primary | ICD-10-CM

## 2022-06-08 DIAGNOSIS — G89.4 CHRONIC PAIN SYNDROME: ICD-10-CM

## 2022-06-08 DIAGNOSIS — I10 HYPERTENSION, UNSPECIFIED TYPE: ICD-10-CM

## 2022-06-08 DIAGNOSIS — F41.9 ANXIETY AND DEPRESSION: ICD-10-CM

## 2022-06-08 DIAGNOSIS — Z79.899 ENCOUNTER FOR MONITORING SUBOXONE MAINTENANCE THERAPY: ICD-10-CM

## 2022-06-08 DIAGNOSIS — J44.9 CHRONIC OBSTRUCTIVE PULMONARY DISEASE, UNSPECIFIED COPD TYPE (HCC): ICD-10-CM

## 2022-06-08 DIAGNOSIS — F19.10 POLYSUBSTANCE ABUSE (HCC): ICD-10-CM

## 2022-06-08 DIAGNOSIS — F32.A ANXIETY AND DEPRESSION: ICD-10-CM

## 2022-06-08 PROCEDURE — 80305 DRUG TEST PRSMV DIR OPT OBS: CPT | Performed by: INTERNAL MEDICINE

## 2022-06-08 PROCEDURE — 99214 OFFICE O/P EST MOD 30 MIN: CPT | Performed by: INTERNAL MEDICINE

## 2022-06-08 RX ORDER — BUPRENORPHINE AND NALOXONE 8; 2 MG/1; MG/1
1 FILM, SOLUBLE BUCCAL; SUBLINGUAL 2 TIMES DAILY
Qty: 56 FILM | Refills: 0 | Status: SHIPPED | OUTPATIENT
Start: 2022-06-08 | End: 2022-07-11 | Stop reason: SDUPTHER

## 2022-06-08 NOTE — PROGRESS NOTES
Verbal order per Dr. Horald Bloch for urine drug screen. Positive for BUP and THC. Verified results with Laura Hinojosa LPN. Dr. Horald Bloch ordered Suboxone 8 mg film BID for patient. Verified dose with patient. Patient was sent home with a script for Suboxone 8 mg film BID for 28 days and will be seen back in the office 07/06/2022.

## 2022-06-08 NOTE — PROGRESS NOTES
[facility] presents today for medical evaluation of severe opioid use disorder, anxiety/depression, weight loss, COPD, tobacco abuse, chronic pain  Patient issue was with pain pills  She saw Bobby Roldan originally October/2021  She has been clean since  Initially her doctor had put her on pain pills, [place] stopped these and put her on Suboxone                  Urges and cravings controlled with Suboxone 8 mg BID     She denies any use     Urine positive for buprenorphine and THC      Not active in counseling     Patient state that her mood has improved since stopping celexa and starting prozac. However, she feels that she is wanting to sleep a lot again.       Medications  The patient has No Known Allergies.     Past Medical History  [facility]  has a past medical history of COPD (chronic obstructive pulmonary disease) ([address] and High blood pressure.     Past Surgical History  The patient  has a past surgical history that includes Tubal ligation (4-2002); hernia repair (4-2002); laparoscopy (1994?); Wrist surgery; and cyst removal.     Family History  This patient's family history includes Cancer in her father; Emphysema in her mother; Esophageal Cancer in her father; Heart Disease in her mother.     Social History  [facility]  reports that she has been smoking cigarettes. She started smoking about 42 years ago. She has a 40.00 pack-year smoking history. She has never used smokeless tobacco. She reports previous alcohol use. She reports previous drug use. Drug: Opiates .             Subjective:      Review of Systems    Patient is feeling well  Patient is not experiencing  withdrawal symptoms ,no urges or cravings  Patient is not having any side effects from the buprenorphine    Objective:   Blood pressure 107/61, pulse 57, temperature (!) 96.6 °F (35.9 °C), height 5' 5\" (1.651 m), weight 108 lb (49 kg).     Mental status examination    Cognition: oriented to person place and time      Appearance: Patient is in no acute distress, normal appearance, patient does not appear intoxicated/    Memory: Normal    Behavior/motor: Normal    Mood: Good mood, upbeat    Affect: Mood congruent    Attitude toward examiner: Pleasant, respectful    Thought content no delusions hallucinations suicidal ideation    Insight: fair    Judgment: faif    Eyes: Pupils normal    Skin: No track marks noted ,no rashes noted    COWS score: N/A        Urine tox screen today     .  Component 6/8/22 1317   Alcohol, Urine NEG    Amphetamine Screen, Urine NEG    Barbiturate Screen, Urine NEG    Benzodiazepine Screen, Urine NEG    Buprenorphine Urine POS    Cocaine Metabolite Screen, Urine NEG    FENTANYL SCREEN, URINE NEG    Gabapentin Screen, Urine N/A    MDMA, Urine NEG    Methadone Screen, Urine NEG    Methamphetamine, Urine NEG    Opiate Scrn, Ur NEG    Oxycodone Screen, Ur NEG    PCP Screen, Urine NEG    Propoxyphene Screen, Urine N/A    Synthetic Cannabinoids (K2) Screen, Urine NEG    THC Screen, Urine POS    Tramadol Scrn, Ur NEG    Tricyclic Antidepressants, Urine N/A                  Diagnosis Orders   1. Severe opioid use disorder, in sustained remission, on maintenance therapy ([facility]     2. Chronic pain syndrome     3. Anxiety and depression     4. Hypertension, unspecified type     5. Chronic obstructive pulmonary disease, unspecified COPD type ([facility] [address]     6. Encounter for monitoring Suboxone maintenance therapy     7.  Polysubstance abuse ([facility] [address]                Plan  Patient is doing well  Continue Suboxone 8 mg twice daily for opioid use disorder  Follow-up 4 weeks with Guzman Puente  I reviewed the [place] Automated Rx Reporting System report     There does not appear to be any discrepancies or overprescribing of controlled substances  She is prescribed gabapentin  She got a quantity of  mg capsules filled on 5/28  I discussed a new phone xavier dealing with substance use disorder with the patient  It is called reSet  It is a 24/7 hour system that the patient can use at any time  There are lesson plans, tracking of treatment, rewards, etc  I told the patient's I will also have access to their account to track their progress  Patient will discuss with Godfrey Manuel and sign up if interested

## 2022-06-15 DIAGNOSIS — F32.A ANXIETY AND DEPRESSION: ICD-10-CM

## 2022-06-15 DIAGNOSIS — F41.9 ANXIETY AND DEPRESSION: ICD-10-CM

## 2022-06-15 DIAGNOSIS — R11.0 NAUSEA: ICD-10-CM

## 2022-06-15 RX ORDER — ONDANSETRON 4 MG/1
TABLET, FILM COATED ORAL
Qty: 30 TABLET | Refills: 0 | Status: SHIPPED | OUTPATIENT
Start: 2022-06-15 | End: 2022-07-18

## 2022-06-15 RX ORDER — FLUOXETINE HYDROCHLORIDE 40 MG/1
CAPSULE ORAL
Qty: 30 CAPSULE | Refills: 1 | Status: SHIPPED | OUTPATIENT
Start: 2022-06-15 | End: 2022-08-09

## 2022-06-15 NOTE — TELEPHONE ENCOUNTER
LPN received VM from patient requesting refills on Zofran and Proozac. Refills sent to [facility] in [place], [place].

## 2022-06-22 RX ORDER — GABAPENTIN 300 MG/1
CAPSULE ORAL
Qty: 90 CAPSULE | Refills: 1 | Status: SHIPPED | OUTPATIENT
Start: 2022-06-22 | End: 2022-09-12 | Stop reason: SDUPTHER

## 2022-07-11 DIAGNOSIS — F11.21 SEVERE OPIOID USE DISORDER, IN SUSTAINED REMISSION, ON MAINTENANCE THERAPY (HCC): ICD-10-CM

## 2022-07-11 DIAGNOSIS — R00.1 SYMPTOMATIC BRADYCARDIA: ICD-10-CM

## 2022-07-11 DIAGNOSIS — R00.2 PALPITATIONS: Primary | ICD-10-CM

## 2022-07-11 RX ORDER — BUPRENORPHINE AND NALOXONE 8; 2 MG/1; MG/1
1 FILM, SOLUBLE BUCCAL; SUBLINGUAL 2 TIMES DAILY
Qty: 2 FILM | Refills: 0 | Status: SHIPPED | OUTPATIENT
Start: 2022-07-11 | End: 2022-07-13 | Stop reason: SDUPTHER

## 2022-07-11 NOTE — TELEPHONE ENCOUNTER
Last visit- 6/8/2022  Next visit- 7/12/2022    Requested Prescriptions     Pending Prescriptions Disp Refills    buprenorphine-naloxone (SUBOXONE) 8-2 MG FILM SL film 2 Film 0     Sig: Place 1 Film under the tongue 2 times daily for 1 day.

## 2022-07-13 ENCOUNTER — OFFICE VISIT (OUTPATIENT)
Dept: INTERNAL MEDICINE CLINIC | Age: 57
End: 2022-07-13
Payer: COMMERCIAL

## 2022-07-13 VITALS
DIASTOLIC BLOOD PRESSURE: 65 MMHG | WEIGHT: 108 LBS | SYSTOLIC BLOOD PRESSURE: 104 MMHG | HEART RATE: 71 BPM | HEIGHT: 65 IN | BODY MASS INDEX: 17.99 KG/M2

## 2022-07-13 DIAGNOSIS — F41.9 ANXIETY AND DEPRESSION: ICD-10-CM

## 2022-07-13 DIAGNOSIS — F11.21 SEVERE OPIOID USE DISORDER, IN SUSTAINED REMISSION, ON MAINTENANCE THERAPY (HCC): ICD-10-CM

## 2022-07-13 DIAGNOSIS — F32.A ANXIETY AND DEPRESSION: ICD-10-CM

## 2022-07-13 DIAGNOSIS — F11.20 SEVERE OPIOID USE DISORDER (HCC): Primary | ICD-10-CM

## 2022-07-13 PROCEDURE — 99214 OFFICE O/P EST MOD 30 MIN: CPT | Performed by: NURSE PRACTITIONER

## 2022-07-13 PROCEDURE — 80305 DRUG TEST PRSMV DIR OPT OBS: CPT | Performed by: NURSE PRACTITIONER

## 2022-07-13 RX ORDER — OLANZAPINE 5 MG/1
5 TABLET ORAL NIGHTLY
Qty: 30 TABLET | Refills: 0 | Status: SHIPPED | OUTPATIENT
Start: 2022-07-13 | End: 2022-08-11

## 2022-07-13 RX ORDER — BUPRENORPHINE AND NALOXONE 8; 2 MG/1; MG/1
1 FILM, SOLUBLE BUCCAL; SUBLINGUAL 2 TIMES DAILY
Qty: 56 FILM | Refills: 0 | Status: SHIPPED | OUTPATIENT
Start: 2022-07-13 | End: 2022-08-10 | Stop reason: SDUPTHER

## 2022-07-13 ASSESSMENT — ENCOUNTER SYMPTOMS
SINUS PAIN: 0
BACK PAIN: 1
SINUS PRESSURE: 0
ABDOMINAL DISTENTION: 0
DIARRHEA: 0
WHEEZING: 0
VOMITING: 0
COUGH: 0
TROUBLE SWALLOWING: 0
SORE THROAT: 0
RHINORRHEA: 0
PHOTOPHOBIA: 0
BLOOD IN STOOL: 0
CONSTIPATION: 0
NAUSEA: 0
ABDOMINAL PAIN: 0
SHORTNESS OF BREATH: 1

## 2022-07-13 NOTE — PROGRESS NOTES
[facility]  [address]  Dept: [phone]  Dept Fax: [phone]: [phone]     Visit Date:  7/13/2022    Patient:  Vinay Scott  YOB: 1965    HPI:     Chief Complaint   Patient presents with    Drug Problem     [facility] presents today for medical evaluation of severe opioid use disorder, anxiety/depression, weight loss, COPD, tobacco abuse, chronic pain     I last seen her 8 weeks ago. She seen Dr. Maylin Gardner at last visit.      She was hospitalized 12/27-1/3 for symptomatic bradycardia, carbon monoxide poisoning, altered mental status, and severe malnutrition. Is following with cardiology. Had a 30 day event monitor, and she was started on metoprolol. She is scheduled to repeat event monitor in June.     No chest pain or palpitations. BP today in office 104/65, HR 71. Following with pulmonary as well. PFTs completed. Severe COPD. She is a current every day smoker, interested in smoking cessation. Did well on chantix previously, but started smoking again once she finished the starter pack (did not realize that she could continue it). Insurance refuses to cover, and it is too expensive for patient to afford. CT chest completed 1/5/22. There are small nodules, need repeated in 6 months.      Weight is up today to 108 pounds today. No family history of GI cancers. No significant nausea/vomiting. No blood in her stool. No constipation/diarrhea. Hep C ordered, not yet obtained. Liver enzymes WNL. Colonoscopy 3/15- polyps removed, hemorrhoids noted, and scattered diverticula noted.      Urges and cravings controlled with Suboxone 8 mg BID     She denies any use     Urine positive for buprenorphine and THC      Not active in counseling     Patient state that she is feeling increasingly depressed again. No energy.  Wanting to stay in bed for several days at a time.      States that she has been prescribed opioids by her PCP for several years for chronic pain issues. She has chronic back pain reportedly. Is scheduled to follow up with pain management. Mobic is helpful. Medications    Current Outpatient Medications:     buprenorphine-naloxone (SUBOXONE) 8-2 MG FILM SL film, Place 1 Film under the tongue 2 times daily for 28 days. , Disp: 56 Film, Rfl: 0    OLANZapine (ZYPREXA) 5 MG tablet, Take 1 tablet by mouth nightly, Disp: 30 tablet, Rfl: 0    gabapentin (NEURONTIN) 300 MG capsule, take 1 capsule by mouth three times a day, Disp: 90 capsule, Rfl: 1    FLUoxetine (PROZAC) 40 MG capsule, take 1 capsule by mouth once daily, Disp: 30 capsule, Rfl: 1    ondansetron (ZOFRAN) 4 MG tablet, take 1 tablet by mouth three times a day if needed, Disp: 30 tablet, Rfl: 0    meloxicam (MOBIC) 15 MG tablet, Take 1 tablet by mouth daily, Disp: 30 tablet, Rfl: 1    triamterene-hydroCHLOROthiazide (MAXZIDE) 75-50 MG per tablet, Take 1 tablet by mouth daily, Disp: 30 tablet, Rfl: 1    Multiple Vitamins-Minerals (THERAPEUTIC MULTIVITAMIN-MINERALS) tablet, Take 1 tablet by mouth daily, Disp: , Rfl:     Caffeine (VIVARIN) 200 MG TABS, Take 200 mg by mouth 3 times daily as needed for Other (OTC for drowsiness), Disp: , Rfl:     albuterol sulfate HFA (VENTOLIN HFA) 108 (90 Base) MCG/ACT inhaler, Inhale 2 puffs into the lungs 4 times daily as needed for Wheezing, Disp: 54 g, Rfl: 1    metoprolol tartrate (LOPRESSOR) 25 MG tablet, Take 1 tablet by mouth 2 times daily, Disp: 180 tablet, Rfl: 1    naloxone 4 MG/0.1ML LIQD nasal spray, 1 spray by Nasal route as needed for Opioid Reversal, Disp: 1 each, Rfl: 1    The patient has No Known Allergies. Past Medical History  Kavon Bean  has a past medical history of COPD (chronic obstructive pulmonary disease) ([facility] [address] and High blood pressure.     Past Surgical History  The patient  has a past surgical history that includes Tubal ligation (4-2002); hernia repair (4-2002); laparoscopy (1994?); Wrist surgery; and cyst removal.    Family History  This patient's family history includes Cancer in her father; Emphysema in her mother; Esophageal Cancer in her father; Heart Disease in her mother. Social History  Chantale Zarate  reports that she has been smoking cigarettes. She started smoking about 42 years ago. She has a 40.00 pack-year smoking history. She has never used smokeless tobacco. She reports previous alcohol use. She reports previous drug use. Drug: Opiates . Health Maintenance:    Health Maintenance   Topic Date Due    COVID-19 Vaccine (1) Never done    Pneumococcal 0-64 years Vaccine (1 - PCV) Never done    HIV screen  Never done    Hepatitis C screen  Never done    DTaP/Tdap/Td vaccine (1 - Tdap) Never done    Cervical cancer screen  Never done    Lipids  Never done    Colorectal Cancer Screen  Never done    Shingles vaccine (1 of 2) Never done    Breast cancer screen  11/21/2013    Flu vaccine (1) 09/01/2022    Depression Monitoring  10/07/2022    Low dose CT lung screening  01/05/2023    Hepatitis A vaccine  Aged Out    Hepatitis B vaccine  Aged Out    Hib vaccine  Aged Out    Meningococcal (ACWY) vaccine  Aged Out       Subjective:      Review of Systems   Constitutional: Positive for fatigue. Negative for chills, fever and unexpected weight change. HENT: Negative for congestion, dental problem, ear pain, hearing loss, rhinorrhea, sinus pressure, sinus pain, sore throat, tinnitus and trouble swallowing. Eyes: Negative for photophobia and visual disturbance. Respiratory: Positive for shortness of breath (on exertion). Negative for cough and wheezing. Cardiovascular: Negative for chest pain, palpitations and leg swelling. Gastrointestinal: Negative for abdominal distention, abdominal pain, blood in stool, constipation, diarrhea, nausea and vomiting. Endocrine: Negative for polydipsia, polyphagia and polyuria.    Genitourinary: Negative for difficulty urinating, dysuria, frequency, hematuria and urgency. Musculoskeletal: Positive for back pain (chronic). Negative for arthralgias and myalgias. Skin: Negative for rash and wound. Neurological: Negative for dizziness, light-headedness and headaches. Psychiatric/Behavioral: Positive for dysphoric mood. Negative for sleep disturbance. The patient is not nervous/anxious. Objective:     /65 (Site: Left Lower Arm, Position: Sitting)   Pulse 71   Ht 5' 5\" (1.651 m)   Wt 108 lb (49 kg)   BMI 17.97 kg/m²     Physical Exam  Vitals reviewed. Constitutional:       General: She is not in acute distress. Appearance: Normal appearance. She is not ill-appearing. HENT:      Head: Normocephalic and atraumatic. Right Ear: External ear normal.      Left Ear: External ear normal.      Nose: Nose normal. No congestion or rhinorrhea. Mouth/Throat:      Mouth: Mucous membranes are moist.   Eyes:      Extraocular Movements: Extraocular movements intact. Conjunctiva/sclera: Conjunctivae normal.      Pupils: Pupils are equal, round, and reactive to light. Cardiovascular:      Rate and Rhythm: Normal rate and regular rhythm. Pulses: Normal pulses. Heart sounds: Normal heart sounds. No murmur heard. Pulmonary:      Effort: Pulmonary effort is normal. No respiratory distress. Breath sounds: Examination of the right-lower field reveals decreased breath sounds. Examination of the left-lower field reveals decreased breath sounds. Decreased breath sounds present. No rhonchi. Abdominal:      General: Abdomen is flat. Bowel sounds are normal. There is no distension. Palpations: Abdomen is soft. Musculoskeletal:         General: Normal range of motion. Cervical back: Normal range of motion and neck supple. Right lower leg: No edema. Left lower leg: No edema. Skin:     General: Skin is warm and dry. Findings: No erythema, lesion or rash. Neurological:      General: No focal deficit present. Mental Status: She is alert and oriented to person, place, and time. Psychiatric:         Mood and Affect: Mood normal.         Behavior: Behavior normal.         Thought Content: Thought content normal.         Judgment: Judgment normal.         Labs Reviewed 7/13/2022:    Lab Results   Component Value Date    WBC 8.0 12/31/2021    HGB 11.8 (L) 12/31/2021    HCT 37.2 12/31/2021     12/31/2021    ALT 13 12/28/2021    AST 22 12/28/2021     01/02/2022    K 3.6 01/02/2022     01/02/2022    CREATININE 0.8 01/02/2022    BUN 15 01/02/2022    CO2 24 01/02/2022    TSH 0.822 12/27/2021    INR 0.99 12/31/2021       Assessment/Plan      1. Severe opioid use disorder (HCC)    - POCT Rapid Drug Screen    2. Severe opioid use disorder, in sustained remission, on maintenance therapy (HCC)    - buprenorphine-naloxone (SUBOXONE) 8-2 MG FILM SL film; Place 1 Film under the tongue 2 times daily for 28 days. Dispense: 56 Film; Refill: 0  - Narcan at home  - Labs not yet obtained  - OARRS reviewed, no discrepancies  - Encouraged to attend NA/AA meetings and/or arrange for individualized counseling    3. Anxiety and depression    - OLANZapine (ZYPREXA) 5 MG tablet; Take 1 tablet by mouth nightly  Dispense: 30 tablet; Refill: 0      Obtain labs ordered previously to rule out medical causes of fatigue      Return in about 4 weeks (around 8/10/2022). Patient given educational materials - see patient instructions. Discussed use, benefit, and side effects of prescribed medications. All patient questions answered. Pt voiced understanding. Reviewed health maintenance.        Electronically signed ENMANUEL Marc CNP on 7/13/22 at 3:52 PM EDT

## 2022-07-15 DIAGNOSIS — R11.0 NAUSEA: ICD-10-CM

## 2022-07-15 DIAGNOSIS — G89.4 CHRONIC PAIN SYNDROME: ICD-10-CM

## 2022-07-18 ENCOUNTER — HOSPITAL ENCOUNTER (OUTPATIENT)
Age: 57
Discharge: HOME | End: 2022-07-18
Payer: COMMERCIAL

## 2022-07-18 DIAGNOSIS — R53.83 FATIGUE, UNSPECIFIED TYPE: ICD-10-CM

## 2022-07-18 LAB
ERYTHROCYTE [DISTWIDTH] IN BLOOD BY AUTOMATED COUNT: 15.1 % (ref 11.5–14.5)
ERYTHROCYTE [DISTWIDTH] IN BLOOD BY AUTOMATED COUNT: 54.1 FL (ref 35–45)
HCT VFR BLD CALC: 47.1 % (ref 37–47)
HEMOGLOBIN: 15.1 GM/DL (ref 12–16)
MCH RBC QN AUTO: 31.3 PG (ref 26–33)
MCHC RBC AUTO-ENTMCNC: 32.1 GM/DL (ref 32.2–35.5)
MCV RBC AUTO: 97.7 FL (ref 81–99)
PLATELET # BLD: 298 THOU/MM3 (ref 130–400)
PMV BLD AUTO: 9.4 FL (ref 9.4–12.4)
RBC # BLD: 4.82 MILL/MM3 (ref 4.2–5.4)
TSH SERPL DL<=0.05 MIU/L-ACNC: 3.38 UIU/ML (ref 0.4–4.2)
WBC # BLD: 11.5 THOU/MM3 (ref 4.8–10.8)

## 2022-07-18 PROCEDURE — 83036 HEMOGLOBIN GLYCOSYLATED A1C: CPT

## 2022-07-18 PROCEDURE — 36415 COLL VENOUS BLD VENIPUNCTURE: CPT

## 2022-07-18 PROCEDURE — 85027 COMPLETE CBC AUTOMATED: CPT

## 2022-07-18 RX ORDER — ONDANSETRON 4 MG/1
TABLET, FILM COATED ORAL
Qty: 30 TABLET | Refills: 0 | Status: SHIPPED | OUTPATIENT
Start: 2022-07-18 | End: 2022-07-26 | Stop reason: SDUPTHER

## 2022-07-18 RX ORDER — MELOXICAM 15 MG/1
TABLET ORAL
Qty: 30 TABLET | Refills: 0 | Status: SHIPPED | OUTPATIENT
Start: 2022-07-18 | End: 2022-07-26 | Stop reason: SDUPTHER

## 2022-07-19 LAB
AVERAGE GLUCOSE: 111 MG/DL (ref 70–126)
HBA1C MFR BLD: 5.7 % (ref 4.4–6.4)
REASON FOR REJECTION: NORMAL
REJECTED TEST: NORMAL

## 2022-07-21 DIAGNOSIS — I10 HYPERTENSION, UNSPECIFIED TYPE: ICD-10-CM

## 2022-07-21 RX ORDER — TRIAMTERENE AND HYDROCHLOROTHIAZIDE 75; 50 MG/1; MG/1
TABLET ORAL
Qty: 30 TABLET | Refills: 1 | Status: SHIPPED | OUTPATIENT
Start: 2022-07-21 | End: 2022-08-25

## 2022-07-25 ENCOUNTER — HOSPITAL ENCOUNTER (OUTPATIENT)
Age: 57
Discharge: HOME | End: 2022-07-25
Payer: COMMERCIAL

## 2022-07-25 DIAGNOSIS — G89.4 CHRONIC PAIN SYNDROME: ICD-10-CM

## 2022-07-25 DIAGNOSIS — R11.0 NAUSEA: ICD-10-CM

## 2022-07-25 LAB — TSH SERPL DL<=0.05 MIU/L-ACNC: 4.35 UIU/ML (ref 0.4–4.2)

## 2022-07-25 PROCEDURE — 84439 ASSAY OF FREE THYROXINE: CPT

## 2022-07-25 PROCEDURE — 36415 COLL VENOUS BLD VENIPUNCTURE: CPT

## 2022-07-25 PROCEDURE — 80053 COMPREHEN METABOLIC PANEL: CPT

## 2022-07-25 PROCEDURE — 84443 ASSAY THYROID STIM HORMONE: CPT

## 2022-07-26 ENCOUNTER — TELEPHONE (OUTPATIENT)
Dept: INTERNAL MEDICINE CLINIC | Age: 57
End: 2022-07-26

## 2022-07-26 ENCOUNTER — HOSPITAL ENCOUNTER (OUTPATIENT)
Age: 57
Discharge: HOME | End: 2022-07-26
Payer: COMMERCIAL

## 2022-07-26 DIAGNOSIS — B37.0 THRUSH: Primary | ICD-10-CM

## 2022-07-26 DIAGNOSIS — R53.83 FATIGUE, UNSPECIFIED TYPE: Primary | ICD-10-CM

## 2022-07-26 DIAGNOSIS — R53.83 FATIGUE, UNSPECIFIED TYPE: ICD-10-CM

## 2022-07-26 LAB
ALBUMIN SERPL-MCNC: 4.5 G/DL (ref 3.5–5.1)
ALP BLD-CCNC: 70 U/L (ref 38–126)
ALT SERPL-CCNC: 15 U/L (ref 11–66)
ANION GAP SERPL CALCULATED.3IONS-SCNC: 9 MEQ/L (ref 8–16)
ANION GAP: 8 MEQ/L (ref 8–16)
AST SERPL-CCNC: 19 U/L (ref 5–40)
BILIRUB SERPL-MCNC: 0.2 MG/DL (ref 0.3–1.2)
BUN BLDV-MCNC: 19 MG/DL (ref 7–18)
BUN BLDV-MCNC: 22 MG/DL (ref 7–22)
CALCIUM SERPL-MCNC: 10.1 MG/DL (ref 8.5–10.5)
CHLORIDE BLD-SCNC: 101 MEQ/L (ref 98–111)
CHLORIDE BLD-SCNC: 104 MEQ/L (ref 98–107)
CO2: 27 MEQ/L (ref 23–33)
CO2: 30 MEQ/L (ref 21–32)
CREAT SERPL-MCNC: 1.3 MG/DL (ref 0.4–1.2)
CREAT SERPL-MCNC: 1.3 MG/DL (ref 0.6–1.3)
GFR SERPL CREATININE-BSD FRML MDRD: 42 ML/MIN/1.73M2
GFR, ESTIMATED: 44 ML/MIN/1.73M2
GLUCOSE BLD-MCNC: 105 MG/DL (ref 70–108)
GLUCOSE BLD-MCNC: 109 MG/DL (ref 74–106)
POC CALCIUM: 8.9 MG/DL (ref 8.5–10.1)
POTASSIUM SERPL-SCNC: 4.8 MEQ/L (ref 3.5–5.1)
POTASSIUM SERPL-SCNC: 6.1 MEQ/L (ref 3.5–5.2)
SODIUM BLD-SCNC: 137 MEQ/L (ref 135–145)
SODIUM BLD-SCNC: 142 MEQ/L (ref 136–145)
T4 FREE: 0.95 NG/DL (ref 0.93–1.76)
TOTAL PROTEIN: 7.3 G/DL (ref 6.1–8)

## 2022-07-26 PROCEDURE — 80048 BASIC METABOLIC PNL TOTAL CA: CPT

## 2022-07-26 PROCEDURE — 36415 COLL VENOUS BLD VENIPUNCTURE: CPT

## 2022-07-26 RX ORDER — ONDANSETRON 4 MG/1
TABLET, FILM COATED ORAL
Qty: 30 TABLET | Refills: 0 | Status: SHIPPED | OUTPATIENT
Start: 2022-07-26

## 2022-07-26 RX ORDER — MELOXICAM 15 MG/1
TABLET ORAL
Qty: 30 TABLET | Refills: 0 | Status: SHIPPED | OUTPATIENT
Start: 2022-07-26 | End: 2022-09-06

## 2022-07-26 NOTE — TELEPHONE ENCOUNTER
Labs reviewed. Critically elevated potassium, creatinine is elevated also. Has she been sick? Nausea/vomiting. Repeat BMP stat today or go to ER forevaluation. Called patient and went over lab results. Patient did not want to go to ER to be evaluated at this time. Patient wanted to have labs redrawn first.  Order placed. Patient education provided at this time.

## 2022-07-29 ENCOUNTER — HOSPITAL ENCOUNTER (OUTPATIENT)
Dept: NON INVASIVE DIAGNOSTICS | Age: 57
Discharge: HOME | End: 2022-07-29
Payer: COMMERCIAL

## 2022-07-29 DIAGNOSIS — R00.2 PALPITATIONS: ICD-10-CM

## 2022-07-29 DIAGNOSIS — R06.02 SOB (SHORTNESS OF BREATH): ICD-10-CM

## 2022-07-29 DIAGNOSIS — R00.1 SYMPTOMATIC BRADYCARDIA: ICD-10-CM

## 2022-07-29 PROCEDURE — 93226 XTRNL ECG REC<48 HR SCAN A/R: CPT

## 2022-07-29 PROCEDURE — 93225 XTRNL ECG REC<48 HRS REC: CPT

## 2022-07-29 NOTE — PROCEDURES
48 hour Holter Monitor was applied to patient.  Patient was instructed to remove monitor on 7/31 at 1519 and return to  of hospital. The serial number on the Holter Monitor is [id number] no

## 2022-08-01 ENCOUNTER — INITIAL CONSULT (OUTPATIENT)
Dept: PULMONOLOGY | Age: 57
End: 2022-08-01
Payer: COMMERCIAL

## 2022-08-01 VITALS
HEIGHT: 65 IN | TEMPERATURE: 97.9 F | OXYGEN SATURATION: 93 % | WEIGHT: 109 LBS | HEART RATE: 58 BPM | SYSTOLIC BLOOD PRESSURE: 124 MMHG | BODY MASS INDEX: 18.16 KG/M2 | DIASTOLIC BLOOD PRESSURE: 62 MMHG

## 2022-08-01 DIAGNOSIS — J44.9 CHRONIC OBSTRUCTIVE PULMONARY DISEASE, UNSPECIFIED COPD TYPE (HCC): ICD-10-CM

## 2022-08-01 DIAGNOSIS — R06.83 SNORING: ICD-10-CM

## 2022-08-01 DIAGNOSIS — R53.82 CHRONIC FATIGUE: ICD-10-CM

## 2022-08-01 DIAGNOSIS — G47.10 HYPERSOMNIA: Primary | ICD-10-CM

## 2022-08-01 DIAGNOSIS — R45.1 RESTLESSNESS: ICD-10-CM

## 2022-08-01 PROCEDURE — 99213 OFFICE O/P EST LOW 20 MIN: CPT | Performed by: INTERNAL MEDICINE

## 2022-08-01 NOTE — PROGRESS NOTES
New Sleep Patient H/P    Presentation:  Sherrlyn Olszewski is referred by Erma Walls for  {SLEEP MQE:588954557}    Time in Bed:   Bedtime: {PROC CLOCK POSITION:}{AM/PM:7626030603}   Awakens  {{PROC CLOCK POSITION:} {AM/PM:5425257814}   Different on weekends? {YES / J}  How?***     Sherrlyn Olszewski falls asleep in {Numbers; 10,15,25,40,60:12915}  minutes. Any awakenings? {YES / YH:11083}  Difficulty Falling back to sleep? {YES / ZK:78988}  {Symptoms began:  {numbers; 0-10:86987} {time units:11} ago. Symptoms include: {Sleep apnea short:3224738714}    Previous evaluation and treatment? {YES / GY:27962}  Where? Which ones? {sleep tx:43732}    She denies any history of sleep walking or sleep talking. No history of seizures activity. No history suggestive of restless legs syndrome. No history of bruxism. No history of head injury. Naps:  Any naps? {YES / JK:57357} and are they helpful {YES / NO:31766}    Snoring and Apneas:  Do you snore or been told you a snore? {YES / TH:50338}  How long have known about your snoring? {days/wks/mos/yrs:582683}  Any witnessed apneas? {YES / LK:29987}  Any awakenings with choking or gasping? {YES / AO:63175}    Dreams:  Any recurring dreams? {YES / RU:86245}  Hallucinations? {YES / OV:04169}  Sleep Paralysis? {YES / XY:85438}  Symptoms of Cataplexy? {YES / SJ:62130}    Driving History:  Do you have a CDL or drive long distances for work? {YES / MC:37031}  Any driving accidents in the past year? {YES / GF:11577}  Any sleepiness while driving? {YES / VH:98431}    Weight:  Any change in weight over the past year? {YES / OQ:38091}   How about past 5 years? {YES / RP:46334}  How much? {NUMBERS BY 5 TO 40:17366}    Other Compliants :Sherrlyn Olszewski complains of {TAN complaints:39195} as well.     Past Medical History:   Diagnosis Date    COPD (chronic obstructive pulmonary disease) (HCC)     High blood pressure        Past Surgical History:   Procedure Laterality Date CYST REMOVAL      vagina    HERNIA REPAIR  4-2002    LAPAROSCOPY  1994? TUBAL LIGATION  4-2002    WRIST SURGERY         Social History     Tobacco Use    Smoking status: Every Day     Packs/day: 1.00     Years: 40.00     Pack years: 40.00     Types: Cigarettes     Start date: 1/15/1980    Smokeless tobacco: Never    Tobacco comments:     Less than 1 ppd   Substance Use Topics    Alcohol use: Not Currently    Drug use: Not Currently     Types: Opiates        No Known Allergies    Current Outpatient Medications   Medication Sig Dispense Refill    meloxicam (MOBIC) 15 MG tablet take 1 tablet by mouth once daily 30 tablet 0    ondansetron (ZOFRAN) 4 MG tablet Take 1 tab prn for nausea 30 tablet 0    nystatin (MYCOSTATIN) 795387 UNIT/ML suspension Take 5 mLs by mouth in the morning and 5 mLs at noon and 5 mLs in the evening and 5 mLs before bedtime. Do all this for 10 days. Retain in mouth as long as possible. 200 mL 0    triamterene-hydroCHLOROthiazide (MAXZIDE) 75-50 MG per tablet take 1 tablet by mouth once daily 30 tablet 1    buprenorphine-naloxone (SUBOXONE) 8-2 MG FILM SL film Place 1 Film under the tongue 2 times daily for 28 days.  56 Film 0    OLANZapine (ZYPREXA) 5 MG tablet Take 1 tablet by mouth nightly 30 tablet 0    gabapentin (NEURONTIN) 300 MG capsule take 1 capsule by mouth three times a day 90 capsule 1    FLUoxetine (PROZAC) 40 MG capsule take 1 capsule by mouth once daily 30 capsule 1    Multiple Vitamins-Minerals (THERAPEUTIC MULTIVITAMIN-MINERALS) tablet Take 1 tablet by mouth daily      Caffeine (VIVARIN) 200 MG TABS Take 200 mg by mouth 3 times daily as needed for Other (OTC for drowsiness)      albuterol sulfate HFA (VENTOLIN HFA) 108 (90 Base) MCG/ACT inhaler Inhale 2 puffs into the lungs 4 times daily as needed for Wheezing 54 g 1    metoprolol tartrate (LOPRESSOR) 25 MG tablet Take 1 tablet by mouth 2 times daily 180 tablet 1    naloxone 4 MG/0.1ML LIQD nasal spray 1 spray by Nasal route as needed for Opioid Reversal 1 each 1     No current facility-administered medications for this visit. Family History   Problem Relation Age of Onset    Heart Disease Mother     Emphysema Mother     Esophageal Cancer Father     Cancer Father         Any family history of any sleep problems or any one in your family on CPAP? {YES / DF:38605}    Social History     Tobacco Use    Smoking status: Every Day     Packs/day: 1.00     Years: 40.00     Pack years: 40.00     Types: Cigarettes     Start date: 1/15/1980    Smokeless tobacco: Never    Tobacco comments:     Less than 1 ppd   Substance Use Topics    Alcohol use: Not Currently    Drug use: Not Currently     Types: Opiates      Caffeine Intake: How much soda (pop), coffee, tea, power drinks do you ingest per day? {NUMBERS 0-10:52421} per day. Employment History:  Where do you work? ***  What are your shifts? ***    Any recent changes in shifts or hours? ***    Review of Systems:   General/Constitutional: No recent loss of weight or appetite changes. No fever or chills. HENT: Negative. Eyes: Negative. Upper respiratory tract: No nasal stuffiness or post nasal drip. Lower respiratory tract/ lungs: No cough or sputum production. No hemoptysis. Cardiovascular: No palpitations or chest pain. Gastrointestinal: No nausea or vomiting. Neurological: No focal neurologiacal weakness. Extremities: No edema. Musculoskeletal: No complaints. Genitourinary: No complaints. Hematological: Negative. Psychiatric/Behavioral: Negative. Skin: No itching. Physical Exam:    {[place] RYMOWR:758193852} {[place] XBNCTI:696916290}    BMI:  Body mass index is 18.14 kg/m².   Neck Size: 12.75  Oxygen Sat: {Exam; oxygen delivery:48569}    SAQLI:61 ESS: 8  Vitals: /62 (Site: Left Upper Arm, Position: Sitting, Cuff Size: Medium Adult)   Pulse 58   Temp 97.9 °F (36.6 °C) (Temporal)   Ht 5' 5\" (1.651 m)   Wt 109 lb (49.4 kg)   SpO2 93% Comment: r/a  BMI 18.14 kg/m² Mallampati Score: 4    Physical Exam :  Constitutional: Moderately built and moderately nourished. No distress. HENT:   Head: Normocephalic and atraumatic. Mouth/Throat: Oropharynx is clear and moist. No oral thrush. Mallampati   . Large tongue. Retrognathic. Eyes: Conjunctivae are normal. PERRLA. No scleral icterus. Neck: Neck supple. No JVD present. No tracheal deviation present. Cardiovascular: Normal rate, regular rhythm, normal heart sounds. No murmur heard. Pulmonary/Chest: Effort normal and breath sounds normal. No stridor. No respiratory distress. No wheezes. No rales. Abdominal: Soft. No distension. No tenderness. Musculoskeletal: Normal range of motion. Lymphadenopathy:  No cervical adenopathy. Neurological: Alert and oriented to person, place, and time. No focal deficits. Skin: Skin is warm and dry. Patient is not diaphoretic. Psychiatric: Normal behavior with normal mood and affect. Diagnostic Data:no priors    Assessment   {Sleep apnea diagnosis:07595}  ***    Plan   {Sleep plan :74624}  ***  Mask Desensitization and Pre study teaching? {YES / XS:82184}  Weight Loss Information Given? {YES / FN:28369}  Sleep Hygiene Discussed? {YES / D}    -She was advised to call [employer] regarding supplies if needed.  -She call my office for earlier appointment if needed for worsening of sleep symptoms.  -Coleen Liao educated about my impression and plan. Patient verbalizes understanding.

## 2022-08-01 NOTE — PROGRESS NOTES
New Sleep Patient H/P    Presentation:  Aislinn Weinberg is referred by Harpreet Dinh for  fatigue    Time in Bed:   Bedtime: 9p.m. Awakens  7 a.m. Different on weekends? No      Sarai falls asleep in 10  minutes. Any awakenings? Yes  Difficulty Falling back to sleep? No  Symptoms began:  1 year ago. Found \"CO in system\"   Symptoms include: snoring, gasping, periods of not breathing, excessive daytime sleepiness  Not all   Previous evaluation and treatment? No  Where? Which ones? none    She denies any history of sleep walking or sleep talking. No history of seizures activity. No history suggestive of restless legs syndrome. No history of bruxism. No history of head injury. Naps:  Any naps? Yes and are they helpful Yes    Snoring and Apneas:  Do you snore or been told you a snore? Yes  How long have known about your snoring? months  Any witnessed apneas? Yes  Any awakenings with choking or gasping? Yes    Dreams:  Any recurring dreams? No  Hallucinations? No  Sleep Paralysis? No  Symptoms of Cataplexy? No    Driving History:  Do you have a CDL or drive long distances for work? No  Any driving accidents in the past year? No  Any sleepiness while driving? Sometimes    Weight:  Any change in weight over the past year? No   How about past 5 years? Yes  How much? 100    Other Compliants :Aislinn Weinberg complains of snoring, choking, periods of not breathing as well. Past Medical History:   Diagnosis Date    COPD (chronic obstructive pulmonary disease) ([facility] [address]     High blood pressure        Past Surgical History:   Procedure Laterality Date    CYST REMOVAL      vagina    HERNIA REPAIR  4-2002    LAPAROSCOPY  1994?     TUBAL LIGATION  4-2002    WRIST SURGERY         Social History     Tobacco Use    Smoking status: Every Day     Packs/day: 1.00     Years: 40.00     Pack years: 40.00     Types: Cigarettes     Start date: 1/15/1980    Smokeless tobacco: Never    Tobacco comments:     Less than 1 ppd Substance Use Topics    Alcohol use: Not Currently    Drug use: Not Currently     Types: Opiates        No Known Allergies    Current Outpatient Medications   Medication Sig Dispense Refill    meloxicam (MOBIC) 15 MG tablet take 1 tablet by mouth once daily 30 tablet 0    ondansetron (ZOFRAN) 4 MG tablet Take 1 tab prn for nausea 30 tablet 0    nystatin (MYCOSTATIN) 178792 UNIT/ML suspension Take 5 mLs by mouth in the morning and 5 mLs at noon and 5 mLs in the evening and 5 mLs before bedtime. Do all this for 10 days. Retain in mouth as long as possible. 200 mL 0    triamterene-hydroCHLOROthiazide (MAXZIDE) 75-50 MG per tablet take 1 tablet by mouth once daily 30 tablet 1    buprenorphine-naloxone (SUBOXONE) 8-2 MG FILM SL film Place 1 Film under the tongue 2 times daily for 28 days. 56 Film 0    OLANZapine (ZYPREXA) 5 MG tablet Take 1 tablet by mouth nightly 30 tablet 0    gabapentin (NEURONTIN) 300 MG capsule take 1 capsule by mouth three times a day 90 capsule 1    FLUoxetine (PROZAC) 40 MG capsule take 1 capsule by mouth once daily 30 capsule 1    Multiple Vitamins-Minerals (THERAPEUTIC MULTIVITAMIN-MINERALS) tablet Take 1 tablet by mouth daily      Caffeine (VIVARIN) 200 MG TABS Take 200 mg by mouth 3 times daily as needed for Other (OTC for drowsiness)      albuterol sulfate HFA (VENTOLIN HFA) 108 (90 Base) MCG/ACT inhaler Inhale 2 puffs into the lungs 4 times daily as needed for Wheezing 54 g 1    metoprolol tartrate (LOPRESSOR) 25 MG tablet Take 1 tablet by mouth 2 times daily 180 tablet 1    naloxone 4 MG/0.1ML LIQD nasal spray 1 spray by Nasal route as needed for Opioid Reversal 1 each 1     No current facility-administered medications for this visit. Family History   Problem Relation Age of Onset    Heart Disease Mother     Emphysema Mother     Esophageal Cancer Father     Cancer Father         Any family history of any sleep problems or any one in your family on CPAP? No    Social History Tobacco Use    Smoking status: Every Day     Packs/day: 1.00     Years: 40.00     Pack years: 40.00     Types: Cigarettes     Start date: 1/15/1980    Smokeless tobacco: Never    Tobacco comments:     Less than 1 ppd   Substance Use Topics    Alcohol use: Not Currently    Drug use: Not Currently     Types: Opiates      Caffeine Intake: How much soda (pop), coffee, tea, power drinks do you ingest per day? 2 per day. Employment History:  Where do you work? No  What are your shifts? None    Any recent changes in shifts or hours? No    Review of Systems:   General/Constitutional: No recent loss of weight or appetite changes. No fever or chills. HENT: Negative. Eyes: Negative. Upper respiratory tract: No nasal stuffiness or post nasal drip. Lower respiratory tract/ lungs: No cough or sputum production. No hemoptysis. Cardiovascular: No palpitations or chest pain. Gastrointestinal: No nausea or vomiting. Neurological: No focal neurologiacal weakness. Extremities: No edema. Musculoskeletal: No complaints. Genitourinary: No complaints. Hematological: Negative. Psychiatric/Behavioral: Negative. Skin: No itching. Physical Exam:    HEIGHTHeight: 5' 5\" (165.1 cm) WEIGHTWeight: 109 lb (49.4 kg)    BMI:  Body mass index is 18.14 kg/m². Neck Size: 12.75  Oxygen Sat: room air    SAQLI:61 ESS: 8  Vitals: /62 (Site: Left Upper Arm, Position: Sitting, Cuff Size: Medium Adult)   Pulse 58   Temp 97.9 °F (36.6 °C) (Temporal)   Ht 5' 5\" (1.651 m)   Wt 109 lb (49.4 kg)   SpO2 93% Comment: r/a  BMI 18.14 kg/m²       Mallampati Score: 4    Physical Exam :  Constitutional: Moderately built and moderately nourished. No distress. HENT:   Head: Normocephalic and atraumatic. Mouth/Throat: Oropharynx is clear and moist. No oral thrush. Mallampati   . Large tongue. Retrognathic. Eyes: Conjunctivae are normal. PERRLA. No scleral icterus. Neck: Neck supple. No JVD present. No tracheal deviation present. Cardiovascular: Normal rate, regular rhythm, normal heart sounds. No murmur heard. Pulmonary/Chest: Effort normal and breath sounds normal. No stridor. No respiratory distress. No wheezes. No rales. Abdominal: Soft. No distension. No tenderness. Musculoskeletal: Normal range of motion. Lymphadenopathy:  No cervical adenopathy. Neurological: Alert and oriented to person, place, and time. No focal deficits. Skin: Skin is warm and dry. Patient is not diaphoretic. Psychiatric: Normal behavior with normal mood and affect. Diagnostic Data:no priors    Assessment   Hypersomnia  Snoring  Chronic Fatigue  COPD  Restless    Plan   Will order Baseline Diagnostic Sleep Study to evaluate hypersomnia. Follow-up in 6 weeks, sooner should new symptoms or problems arise.      -She call my office for earlier appointment if needed for worsening of sleep symptoms.  -Lucila Mart educated about my impression and plan. Patient verbalizes understanding.

## 2022-08-04 LAB
ACQUISITION DURATION: NORMAL S
AVERAGE HEART RATE: 57 BPM
HOOKUP DATE: NORMAL
HOOKUP TIME: NORMAL
MAX HEART RATE TIME/DATE: NORMAL
MAX HEART RATE: 89 BPM
MIN HEART RATE TIME/DATE: NORMAL
MIN HEART RATE: 42 BPM
NUMBER OF QRS COMPLEXES: NORMAL
NUMBER OF SUPRAVENTRICULAR COUPLETS: 0
NUMBER OF SUPRAVENTRICULAR ECTOPICS: 197
NUMBER OF SUPRAVENTRICULAR ISOLATED BEATS: 169
NUMBER OF VENTRICULAR BIGEMINAL CYCLES: 4
NUMBER OF VENTRICULAR COUPLETS: 7
NUMBER OF VENTRICULAR ECTOPICS: 3618

## 2022-08-09 DIAGNOSIS — F41.9 ANXIETY AND DEPRESSION: ICD-10-CM

## 2022-08-09 DIAGNOSIS — F32.A ANXIETY AND DEPRESSION: ICD-10-CM

## 2022-08-09 RX ORDER — FLUOXETINE HYDROCHLORIDE 40 MG/1
CAPSULE ORAL
Qty: 30 CAPSULE | Refills: 1 | Status: SHIPPED | OUTPATIENT
Start: 2022-08-09 | End: 2022-09-28

## 2022-08-09 NOTE — PROGRESS NOTES
In preparation for their surgical procedure above patient was screened for Obstructive Sleep Apnea (TAN) using the STOP-Bang Questionnaire by the Pre-Admission Testing department. This is a pre-surgical screening tool for patient safety and serves as a recommendation, this WILL NOT cause cancellation of surgery. STOP-Bang Questionnaire  * Do you currently see a pulmonologist?  No     If yes STOP, do not complete. Patient follows with Dr.     1.  Do you snore loudly (able to be heard in the next room)? No    2. Do you often feel tired or sleepy during the daytime? No       3. Has anyone ever told you that you stop breathing during your sleep? No    4. Do you have or are you being treated for high blood pressure? Yes      5. BMI more than 35? BMI (Calculated): 18.3        No    6. Age over 48 years? 59 y.o. Yes    7. Neck Circumference greater than 17 inches for male or 16 inches for female? Measured           (visits only)            Not Applicable    8. Gender Male? No      TOTAL SCORE: 2    TAN - Low Risk : Yes to 0 - 2 questions  TAN - Intermediate Risk : Yes to 3 - 4 questions  TAN - High Risk : Yes to 5 - 8 questions    Adapted from:   STOP Questionnaire: A Tool to Screen Patients for Obstructive Sleep Apnea   [facility] Deon Booker M.B.B.S., Pierce Egan M.D., Fermin Hardy. Adeline Magana, Ph.D., Whitney Huerta M.B.B.S., Kimberlyn Atkinson, M.Sc., Mirella Koch M.D., Sun Hidalgo. Jannine Fabry, F.R.C.P.C.    Anesthesiology 2008; 725:603-84 Copyright 2008, the [id number] of Anesthesiologists, [facility].   ----------------------------------------------------------------------------------------------------------------

## 2022-08-09 NOTE — PROGRESS NOTES
EKG 2/16 given to anesthesia for review.  Per Dr. Nikia Bray ok to proceed at surgery center 8/15 without further intervention

## 2022-08-09 NOTE — PROGRESS NOTES
NPO after midnight  [employer] and drivers license  Wear comfortable clean clothing  Do not bring jewelry  Shower night before and morning of surgery with a liquid antibacterial soap  Bring list of medications with dosage and how often taken  Follow all instructions given by your physician   needed at discharge  Please limit to 2 visitors for surgery  You must have a responsible adult with you day of surgery and for 24 hours after surgery  Please bring and wear mask  Call [phone] for any questions

## 2022-08-10 ENCOUNTER — OFFICE VISIT (OUTPATIENT)
Dept: INTERNAL MEDICINE CLINIC | Age: 57
End: 2022-08-10
Payer: COMMERCIAL

## 2022-08-10 ENCOUNTER — OFFICE VISIT (OUTPATIENT)
Dept: CARDIOLOGY CLINIC | Age: 57
End: 2022-08-10
Payer: COMMERCIAL

## 2022-08-10 VITALS
TEMPERATURE: 98.2 F | BODY MASS INDEX: 18.43 KG/M2 | HEART RATE: 78 BPM | WEIGHT: 110.6 LBS | DIASTOLIC BLOOD PRESSURE: 86 MMHG | HEIGHT: 65 IN | SYSTOLIC BLOOD PRESSURE: 138 MMHG

## 2022-08-10 VITALS
HEIGHT: 65 IN | OXYGEN SATURATION: 95 % | SYSTOLIC BLOOD PRESSURE: 133 MMHG | DIASTOLIC BLOOD PRESSURE: 73 MMHG | WEIGHT: 110 LBS | HEART RATE: 52 BPM | BODY MASS INDEX: 18.33 KG/M2

## 2022-08-10 DIAGNOSIS — F41.9 ANXIETY AND DEPRESSION: ICD-10-CM

## 2022-08-10 DIAGNOSIS — R00.2 PALPITATIONS: Primary | ICD-10-CM

## 2022-08-10 DIAGNOSIS — F32.A ANXIETY AND DEPRESSION: ICD-10-CM

## 2022-08-10 DIAGNOSIS — F11.21 SEVERE OPIOID USE DISORDER, IN SUSTAINED REMISSION, ON MAINTENANCE THERAPY (HCC): ICD-10-CM

## 2022-08-10 DIAGNOSIS — F11.20 SEVERE OPIOID USE DISORDER (HCC): Primary | ICD-10-CM

## 2022-08-10 PROCEDURE — 99214 OFFICE O/P EST MOD 30 MIN: CPT | Performed by: INTERNAL MEDICINE

## 2022-08-10 PROCEDURE — 99214 OFFICE O/P EST MOD 30 MIN: CPT | Performed by: NURSE PRACTITIONER

## 2022-08-10 PROCEDURE — 80305 DRUG TEST PRSMV DIR OPT OBS: CPT | Performed by: NURSE PRACTITIONER

## 2022-08-10 PROCEDURE — 93000 ELECTROCARDIOGRAM COMPLETE: CPT | Performed by: INTERNAL MEDICINE

## 2022-08-10 RX ORDER — BUPRENORPHINE AND NALOXONE 8; 2 MG/1; MG/1
1 FILM, SOLUBLE BUCCAL; SUBLINGUAL 2 TIMES DAILY
Qty: 56 FILM | Refills: 0 | Status: SHIPPED | OUTPATIENT
Start: 2022-08-10 | End: 2022-09-12 | Stop reason: SDUPTHER

## 2022-08-10 SDOH — ECONOMIC STABILITY: FOOD INSECURITY: WITHIN THE PAST 12 MONTHS, YOU WORRIED THAT YOUR FOOD WOULD RUN OUT BEFORE YOU GOT MONEY TO BUY MORE.: NEVER TRUE

## 2022-08-10 SDOH — ECONOMIC STABILITY: FOOD INSECURITY: WITHIN THE PAST 12 MONTHS, THE FOOD YOU BOUGHT JUST DIDN'T LAST AND YOU DIDN'T HAVE MONEY TO GET MORE.: NEVER TRUE

## 2022-08-10 ASSESSMENT — PATIENT HEALTH QUESTIONNAIRE - PHQ9
5. POOR APPETITE OR OVEREATING: 0
2. FEELING DOWN, DEPRESSED OR HOPELESS: 0
3. TROUBLE FALLING OR STAYING ASLEEP: 0
1. LITTLE INTEREST OR PLEASURE IN DOING THINGS: 0
4. FEELING TIRED OR HAVING LITTLE ENERGY: 0
SUM OF ALL RESPONSES TO PHQ QUESTIONS 1-9: 0
6. FEELING BAD ABOUT YOURSELF - OR THAT YOU ARE A FAILURE OR HAVE LET YOURSELF OR YOUR FAMILY DOWN: 0
SUM OF ALL RESPONSES TO PHQ9 QUESTIONS 1 & 2: 0
8. MOVING OR SPEAKING SO SLOWLY THAT OTHER PEOPLE COULD HAVE NOTICED. OR THE OPPOSITE, BEING SO FIGETY OR RESTLESS THAT YOU HAVE BEEN MOVING AROUND A LOT MORE THAN USUAL: 0
SUM OF ALL RESPONSES TO PHQ QUESTIONS 1-9: 0
10. IF YOU CHECKED OFF ANY PROBLEMS, HOW DIFFICULT HAVE THESE PROBLEMS MADE IT FOR YOU TO DO YOUR WORK, TAKE CARE OF THINGS AT HOME, OR GET ALONG WITH OTHER PEOPLE: 0
7. TROUBLE CONCENTRATING ON THINGS, SUCH AS READING THE NEWSPAPER OR WATCHING TELEVISION: 0
9. THOUGHTS THAT YOU WOULD BE BETTER OFF DEAD, OR OF HURTING YOURSELF: 0

## 2022-08-10 ASSESSMENT — SOCIAL DETERMINANTS OF HEALTH (SDOH): HOW HARD IS IT FOR YOU TO PAY FOR THE VERY BASICS LIKE FOOD, HOUSING, MEDICAL CARE, AND HEATING?: NOT HARD AT ALL

## 2022-08-10 NOTE — TELEPHONE ENCOUNTER
PATIENT DID NOT WANT CT OF CHEST SCHEDULED AT THIS TIME. SHE HAS AN UPCOMING SURGERY THAT SHE IS UNABLE TO WALK X 3 WEEKS. GAVE PT ORDER SHE CAN CALL TO SCHEDULE OR TO CALL THE OFFICE AND WE CAN DO IT FOR HER WHEN SHE IS READY.

## 2022-08-10 NOTE — PROGRESS NOTES
[address] INTERNAL MEDICINE AND MEDICATION MANAGEMENT  [facility] [place] [address]  Dept: [phone]  Dept Fax: [phone]: [phone]     Visit Date:  8/10/2022    Patient:  Patsy Paredes  YOB: 1965    HPI:     Chief Complaint   Patient presents with    Drug Problem     Neisha James presents today for medical evaluation of severe opioid use disorder, anxiety/depression, weight loss, COPD, tobacco abuse, chronic pain     I last seen her 4 weeks ago. She was hospitalized 12/27-1/3 for symptomatic bradycardia, carbon monoxide poisoning, altered mental status, and severe malnutrition. Is following with cardiology. Had a 30 day event monitor, and she was started on metoprolol. No chest pain or palpitations. BP today in office 138/86, HR 78. Following with pulmonary as well. PFTs completed. Severe COPD. She is a current every day smoker, interested in smoking cessation. Did well on chantix previously, but started smoking again once she finished the starter pack (did not realize that she could continue it). Insurance refuses to cover, and it is too expensive for patient to afford. CT chest completed 1/5/22. There are small nodules, need repeated in 6 months. Agrees to reschedule now. Weight is up today to 110 pounds today. No family history of GI cancers. No significant nausea/vomiting. No blood in her stool. No constipation/diarrhea. Hep C ordered, not yet obtained. Liver enzymes WNL. Colonoscopy 3/15- polyps removed, hemorrhoids noted, and scattered diverticula noted. Urges and cravings controlled with Suboxone 8 mg BID     She denies any use. Will send for comprehensive analysis. Urine positive for benzodiazepines, buprenorphine and THC      Not active in counseling     States that she has been prescribed opioids by her PCP for several years for chronic pain issues. She has chronic back pain reportedly.  Is scheduled to follow up with pain management. Mobic is helpful. Having a basal cell carcinoma removed from left leg 8/15    Medications    Current Outpatient Medications:     buprenorphine-naloxone (SUBOXONE) 8-2 MG FILM SL film, Place 1 Film under the tongue in the morning and 1 Film before bedtime. Do all this for 28 days. , Disp: 56 Film, Rfl: 0    FLUoxetine (PROZAC) 40 MG capsule, take 1 capsule by mouth once daily, Disp: 30 capsule, Rfl: 1    meloxicam (MOBIC) 15 MG tablet, take 1 tablet by mouth once daily, Disp: 30 tablet, Rfl: 0    ondansetron (ZOFRAN) 4 MG tablet, Take 1 tab prn for nausea, Disp: 30 tablet, Rfl: 0    triamterene-hydroCHLOROthiazide (MAXZIDE) 75-50 MG per tablet, take 1 tablet by mouth once daily, Disp: 30 tablet, Rfl: 1    Multiple Vitamins-Minerals (THERAPEUTIC MULTIVITAMIN-MINERALS) tablet, Take 1 tablet by mouth daily, Disp: , Rfl:     Caffeine (VIVARIN) 200 MG TABS, Take 200 mg by mouth 3 times daily as needed for Other (OTC for drowsiness), Disp: , Rfl:     albuterol sulfate HFA (VENTOLIN HFA) 108 (90 Base) MCG/ACT inhaler, Inhale 2 puffs into the lungs 4 times daily as needed for Wheezing, Disp: 54 g, Rfl: 1    metoprolol tartrate (LOPRESSOR) 25 MG tablet, Take 1 tablet by mouth 2 times daily (Patient taking differently: Take by mouth 2 times daily), Disp: 180 tablet, Rfl: 1    naloxone 4 MG/0.1ML LIQD nasal spray, 1 spray by Nasal route as needed for Opioid Reversal, Disp: 1 each, Rfl: 1    OLANZapine (ZYPREXA) 5 MG tablet, take 1 tablet by mouth nightly, Disp: 30 tablet, Rfl: 0    gabapentin (NEURONTIN) 300 MG capsule, take 1 capsule by mouth three times a day, Disp: 90 capsule, Rfl: 1    The patient has No Known Allergies. Past Medical History  Neisha James  has a past medical history of COPD (chronic obstructive pulmonary disease) ([address] and High blood pressure.     Past Surgical History  The patient  has a past surgical history that includes Tubal ligation (04/01/2002); hernia repair (04/01/2002); laparoscopy (1994?); Wrist surgery (Right); and cyst removal.    Family History  This patient's family history includes Cancer in her father; Diabetes in her mother; Emphysema in her mother; Esophageal Cancer in her father; Heart Disease in her mother. Social History  Leola Teague  reports that she has been smoking cigarettes. She started smoking about 42 years ago. She has a 40.00 pack-year smoking history. She has never used smokeless tobacco. She reports that she does not currently use alcohol. She reports that she does not currently use drugs after having used the following drugs: Opiates . Health Maintenance:    Health Maintenance   Topic Date Due    COVID-19 Vaccine (1) Never done    Pneumococcal 0-64 years Vaccine (1 - PCV) Never done    HIV screen  Never done    Hepatitis C screen  Never done    DTaP/Tdap/Td vaccine (1 - Tdap) Never done    Cervical cancer screen  Never done    Lipids  Never done    Colorectal Cancer Screen  Never done    Shingles vaccine (1 of 2) Never done    Breast cancer screen  11/21/2013    Flu vaccine (1) 09/01/2022    Low dose CT lung screening  01/05/2023    A1C test (Diabetic or Prediabetic)  07/18/2023    Depression Monitoring  08/10/2023    Hepatitis A vaccine  Aged Out    Hepatitis B vaccine  Aged Out    Hib vaccine  Aged Out    Meningococcal (ACWY) vaccine  Aged Out       Subjective:      Review of Systems   Constitutional:  Positive for fatigue. Negative for chills, fever and unexpected weight change. HENT:  Negative for congestion, dental problem, ear pain, hearing loss, rhinorrhea, sinus pressure, sinus pain, sore throat, tinnitus and trouble swallowing. Eyes:  Negative for photophobia and visual disturbance. Respiratory:  Positive for shortness of breath (on exertion). Negative for cough and wheezing. Cardiovascular:  Negative for chest pain, palpitations and leg swelling.    Gastrointestinal:  Negative for abdominal distention, abdominal pain, blood in stool, constipation, diarrhea, nausea and vomiting. Endocrine: Negative for polydipsia, polyphagia and polyuria. Genitourinary:  Negative for difficulty urinating, dysuria, frequency, hematuria and urgency. Musculoskeletal:  Positive for back pain (chronic). Negative for arthralgias and myalgias. Skin:  Negative for rash and wound. Neurological:  Negative for dizziness, light-headedness and headaches. Psychiatric/Behavioral:  Positive for dysphoric mood. Negative for sleep disturbance. The patient is not nervous/anxious. Objective:     /86 (Site: Right Wrist, Position: Sitting)   Pulse 78   Temp 98.2 °F (36.8 °C) (Temporal)   Ht 5' 5\" (1.651 m)   Wt 110 lb 9.6 oz (50.2 kg)   BMI 18.40 kg/m²     Physical Exam  Vitals reviewed. Constitutional:       General: She is not in acute distress. Appearance: Normal appearance. She is not ill-appearing. HENT:      Head: Normocephalic and atraumatic. Right Ear: External ear normal.      Left Ear: External ear normal.      Nose: Nose normal. No congestion or rhinorrhea. Mouth/Throat:      Mouth: Mucous membranes are moist.   Eyes:      Extraocular Movements: Extraocular movements intact. Conjunctiva/sclera: Conjunctivae normal.      Pupils: Pupils are equal, round, and reactive to light. Cardiovascular:      Rate and Rhythm: Normal rate and regular rhythm. Pulses: Normal pulses. Heart sounds: Normal heart sounds. No murmur heard. Pulmonary:      Effort: Pulmonary effort is normal. No respiratory distress. Breath sounds: Examination of the right-lower field reveals decreased breath sounds. Examination of the left-lower field reveals decreased breath sounds. Decreased breath sounds present. No rhonchi. Abdominal:      General: Abdomen is flat. Bowel sounds are normal. There is no distension. Palpations: Abdomen is soft. Musculoskeletal:         General: Normal range of motion.       Cervical back: Normal range of motion and neck supple. Right lower leg: No edema. Left lower leg: No edema. Skin:     General: Skin is warm and dry. Findings: No erythema, lesion or rash. Neurological:      General: No focal deficit present. Mental Status: She is alert and oriented to person, place, and time. Psychiatric:         Mood and Affect: Mood normal.         Behavior: Behavior normal.         Thought Content: Thought content normal.         Judgment: Judgment normal.       Labs Reviewed 8/10/2022:    Lab Results   Component Value Date    WBC 12.9 (H) 08/11/2022    HGB 15.3 08/11/2022    HCT 47.7 (H) 08/11/2022     08/11/2022    ALT 15 07/25/2022    AST 19 07/25/2022     08/11/2022    K 4.4 08/11/2022    CL 96 (L) 08/11/2022    CREATININE 1.2 08/11/2022    BUN 36 (H) 08/11/2022    CO2 29 08/11/2022    TSH 4.350 (H) 07/25/2022    INR 0.99 12/31/2021    LABA1C 5.7 07/18/2022       Assessment/Plan      1. Severe opioid use disorder (HCC)    - POCT Rapid Drug Screen    2. Severe opioid use disorder, in sustained remission, on maintenance therapy (HCC)    - buprenorphine-naloxone (SUBOXONE) 8-2 MG FILM SL film; Place 1 Film under the tongue in the morning and 1 Film before bedtime. Do all this for 28 days. Dispense: 56 Film; Refill: 0  - Narcan at home  - Labs not yet obtained  - OARRS reviewed, no discrepancies  - Encouraged to attend NA/AA meetings and/or arrange for individualized counseling    Return in about 4 weeks (around 9/7/2022). Patient given educational materials - see patient instructions. Discussed use, benefit, and side effects of prescribed medications. All patient questions answered. Pt voiced understanding. Reviewed health maintenance.        Electronically signed ENMANUEL Bravo - CNP on 8/10/22 at 9:59 AM EDT

## 2022-08-10 NOTE — PROGRESS NOTES
[facility] ()  [address] [place] [id number]  Dept: [phone]  CARDIAC ELECTROPHYSIOLOGY: FOLLOW UP NOTE  PATIENT DEMOGRAPHICS:  Date:   8/10/2022  Patient name:              Marybel Arango  YOB: 1965  Sex: female   MRN:   [id number]    PRIMARY CARE PHYSICIAN:   ENMANUEL Kraus CNP    REFERRING PROVIDER:  ENMANUEL Kraus CNP    REASON FOR FOLLOW UP:  Atrial flutter. HISTORY OF PRESENT ILLNESS:  57/F was admitted to [facility] in 12/2022 for carbon monoxide poisoning. She was noted to have sinus bradycardia requiring temporary pacing. Follow-up event monitor showed episodes of atrial flutter with intermittent aberrancy,  burden 10%. He was seen in the EP clinic in 2/2022 and started on metoprolol. Follow-up Holter monitor from 7/29/2022 showed PVC, burden 2% and isolated PACs. Patient denies palpitation. Continues to smoke. Mild shortness of breath with physical activity. No lower extremity swelling or syncope. Medical Hx: atrial flutter with intermittent aberrancy (1/27/22), sinus bradycardia (1/2022), HTN, macrocytic anemia, chronic smoking, weight loss, bilateral pulmonary nodules under evaluation and COPD    REVIEW OF SYSTEMS:    Constitutional: Negative for chills and fever  HENT: Negative for congestion, sinus pressure, sneezing and sore throat. Eyes: Negative for pain, discharge, redness and itching. Respiratory: Negative for apnea, cough  Gastrointestinal: Negative for blood in stool, constipation, diarrhea   Endocrine: Negative for cold intolerance, heat intolerance, polydipsia. Genitourinary: Negative for dysuria, enuresis, flank pain and hematuria. Musculoskeletal: Negative for arthralgias, joint swelling and neck pain. Neurological: Negative for numbness and headaches. Psychiatric/Behavioral: Negative for agitation, confusion, decreased concentration and dysphoric mood.       PAST MEDICAL HISTORY:  Past Medical History:   Diagnosis Date    COPD (chronic obstructive pulmonary disease) (HCC)     High blood pressure        PSH:  Past Surgical History:   Procedure Laterality Date    CYST REMOVAL      vagina    HERNIA REPAIR  04/01/2002    LAPAROSCOPY  1994? TUBAL LIGATION  04/01/2002    WRIST SURGERY Right        FAMILY HISTORY:  Family History   Problem Relation Age of Onset    Diabetes Mother     Heart Disease Mother     Emphysema Mother     Esophageal Cancer Father     Cancer Father     High Blood Pressure Neg Hx     Stroke Neg Hx         SOCIAL HISTORY:   and lives with . 5 children. Has been smoking 2 packs of cigarettes for a long time now in the process of cutting down. Currently in search of a job  Social History     Socioeconomic History    Marital status:    Tobacco Use    Smoking status: Every Day     Packs/day: 1.00     Years: 40.00     Pack years: 40.00     Types: Cigarettes     Start date: 1/15/1980    Smokeless tobacco: Never    Tobacco comments:     Less than 1 ppd   Vaping Use    Vaping Use: Never used   Substance and Sexual Activity    Alcohol use: Not Currently    Drug use: Not Currently     Types: Opiates         ALLERGY HISTORY:  No Known Allergies     MEDICATIONS:  Current Outpatient Medications   Medication Sig Dispense Refill    FLUoxetine (PROZAC) 40 MG capsule take 1 capsule by mouth once daily 30 capsule 1    meloxicam (MOBIC) 15 MG tablet take 1 tablet by mouth once daily 30 tablet 0    ondansetron (ZOFRAN) 4 MG tablet Take 1 tab prn for nausea 30 tablet 0    triamterene-hydroCHLOROthiazide (MAXZIDE) 75-50 MG per tablet take 1 tablet by mouth once daily 30 tablet 1    buprenorphine-naloxone (SUBOXONE) 8-2 MG FILM SL film Place 1 Film under the tongue 2 times daily for 28 days.  56 Film 0    OLANZapine (ZYPREXA) 5 MG tablet Take 1 tablet by mouth nightly 30 tablet 0    gabapentin (NEURONTIN) 300 MG capsule take 1 capsule by mouth three times a day 90 capsule 1    Multiple Vitamins-Minerals (THERAPEUTIC MULTIVITAMIN-MINERALS) tablet Take 1 tablet by mouth daily      Caffeine (VIVARIN) 200 MG TABS Take 200 mg by mouth 3 times daily as needed for Other (OTC for drowsiness)      albuterol sulfate HFA (VENTOLIN HFA) 108 (90 Base) MCG/ACT inhaler Inhale 2 puffs into the lungs 4 times daily as needed for Wheezing 54 g 1    metoprolol tartrate (LOPRESSOR) 25 MG tablet Take 1 tablet by mouth 2 times daily (Patient taking differently: Take by mouth 2 times daily) 180 tablet 1    naloxone 4 MG/0.1ML LIQD nasal spray 1 spray by Nasal route as needed for Opioid Reversal 1 each 1     No current facility-administered medications for this visit. PHYSICAL EXAM:  Vitals:    08/10/22 1102   BP: 133/73   Pulse: 52   SpO2: 95%     Body mass index is 18.3 kg/m². GENERAL: Alert and oriented. No distress. Undernourished. EYES: No pallor or icterus. ENT: No cyanosis. No thyromegaly or cervical LAP. VESSELS: No jugular venous distension or carotid bruits. HEART: Normal S1/S2. No murmur, rub or gallop. LUNGS: Expiratory wheezes bilaterally. Decreased air entry bilaterally. ABDOMEN: Scaphoid, soft and non-tender. EXTREMITIES: No lower extremity edema. Feet are warm. NEUROLOGICAL: Grossly normal.     LABORATORY DATA AND DIAGNOSTIC DATA:  I have personally reviewed and interpreted the results of the following diagnostic testing    Lab Results   Component Value Date    WBC 11.5 (H) 07/18/2022    HGB 15.1 07/18/2022    HCT 47.1 (H) 07/18/2022     07/18/2022    ALT 15 07/25/2022    AST 19 07/25/2022     07/26/2022    K 4.8 07/26/2022     07/26/2022    CREATININE 1.3 07/26/2022    BUN 19 (H) 07/26/2022    CO2 30 07/26/2022    TSH 4.350 (H) 07/25/2022    INR 0.99 12/31/2021    LABA1C 5.7 07/18/2022     Echocardiogram LVEF 50%, LVWT 0.9cm, LAD/SURI NA. No significant valvular abnormalities. ECG Sinus bradycardia (46 BPM). Normal QRS.    Coronary angiogram 12/27/2021: No CAD. Event monitor: Narrow complex tachycardia (fastest 162 BPM and burden 10%) with intermittent aberrancy. IMPRESSION:  Low burden atrial tachycardia/flutter with intermittent aberrancy. Low burden PVCs and PACs  Metoprolol related sinus bradycardia, asymptomatic. HTN, controlled. Smoking and COPD  History of opioid abuse, now clean. ASSESSMENT AND RECOMMENDATIONS:  Patient denies palpitation. Low burden PACs and PVCs on the Holter monitor. Continue metoprolol at current doses. Follow-up in a year. The patient was consulted about smoking cessation. Thank you for allowing me to participate in the care of your patient. Please call me if you have any questions. **This report has been created using voice recognition software. It may contain minor errors which are inherent in voice recognition technology. **     Catalino Hogue MD, MRCP, [facility], [facility] on 8/10/2022 at 11:15 AM

## 2022-08-10 NOTE — PROGRESS NOTES
Per Verbal order of HERACLIO Up for urine drug screen. Patient is positive for   BUP,BENZO,THC   . Verified with results with Grisel Pérez RN .

## 2022-08-11 ENCOUNTER — HOSPITAL ENCOUNTER (OUTPATIENT)
Age: 57
Discharge: HOME | End: 2022-08-11
Payer: COMMERCIAL

## 2022-08-11 ENCOUNTER — HOSPITAL ENCOUNTER (OUTPATIENT)
Dept: GENERAL RADIOLOGY | Age: 57
Discharge: HOME | End: 2022-08-11
Payer: COMMERCIAL

## 2022-08-11 DIAGNOSIS — C44.719 BASAL CELL CARCINOMA OF SKIN OF LEFT LOWER LIMB, INCLUDING HIP: ICD-10-CM

## 2022-08-11 DIAGNOSIS — Z72.0 TOBACCO USE: ICD-10-CM

## 2022-08-11 DIAGNOSIS — Z01.818 ENCOUNTER FOR OTHER PREPROCEDURAL EXAMINATION: ICD-10-CM

## 2022-08-11 LAB
ANION GAP SERPL CALCULATED.3IONS-SCNC: 13 MEQ/L (ref 8–16)
BUN BLDV-MCNC: 36 MG/DL (ref 7–22)
CALCIUM SERPL-MCNC: 10.2 MG/DL (ref 8.5–10.5)
CHLORIDE BLD-SCNC: 96 MEQ/L (ref 98–111)
CO2: 29 MEQ/L (ref 23–33)
CREAT SERPL-MCNC: 1.2 MG/DL (ref 0.4–1.2)
ERYTHROCYTE [DISTWIDTH] IN BLOOD BY AUTOMATED COUNT: 14.5 % (ref 11.5–14.5)
ERYTHROCYTE [DISTWIDTH] IN BLOOD BY AUTOMATED COUNT: 52.5 FL (ref 35–45)
GFR SERPL CREATININE-BSD FRML MDRD: 46 ML/MIN/1.73M2
GLUCOSE BLD-MCNC: 103 MG/DL (ref 70–108)
HCT VFR BLD CALC: 47.7 % (ref 37–47)
HEMOGLOBIN: 15.3 GM/DL (ref 12–16)
MCH RBC QN AUTO: 31.7 PG (ref 26–33)
MCHC RBC AUTO-ENTMCNC: 32.1 GM/DL (ref 32.2–35.5)
MCV RBC AUTO: 98.8 FL (ref 81–99)
PLATELET # BLD: 392 THOU/MM3 (ref 130–400)
PMV BLD AUTO: 9 FL (ref 9.4–12.4)
POTASSIUM SERPL-SCNC: 4.4 MEQ/L (ref 3.5–5.2)
RBC # BLD: 4.83 MILL/MM3 (ref 4.2–5.4)
SODIUM BLD-SCNC: 138 MEQ/L (ref 135–145)
WBC # BLD: 12.9 THOU/MM3 (ref 4.8–10.8)

## 2022-08-11 PROCEDURE — 36415 COLL VENOUS BLD VENIPUNCTURE: CPT

## 2022-08-11 PROCEDURE — 71046 X-RAY EXAM CHEST 2 VIEWS: CPT

## 2022-08-11 PROCEDURE — 80048 BASIC METABOLIC PNL TOTAL CA: CPT

## 2022-08-11 PROCEDURE — 85027 COMPLETE CBC AUTOMATED: CPT

## 2022-08-11 RX ORDER — OLANZAPINE 5 MG/1
5 TABLET ORAL NIGHTLY
Qty: 30 TABLET | Refills: 0 | Status: SHIPPED | OUTPATIENT
Start: 2022-08-11 | End: 2022-08-29

## 2022-08-15 ENCOUNTER — ANESTHESIA (OUTPATIENT)
Dept: OPERATING ROOM | Age: 57
End: 2022-08-15
Payer: COMMERCIAL

## 2022-08-15 ENCOUNTER — ANESTHESIA EVENT (OUTPATIENT)
Dept: OPERATING ROOM | Age: 57
End: 2022-08-15
Payer: COMMERCIAL

## 2022-08-15 ENCOUNTER — HOSPITAL ENCOUNTER (OUTPATIENT)
Age: 57
Setting detail: OUTPATIENT SURGERY
Discharge: HOME | End: 2022-08-15
Attending: SPECIALIST | Admitting: SPECIALIST
Payer: COMMERCIAL

## 2022-08-15 VITALS
RESPIRATION RATE: 16 BRPM | TEMPERATURE: 97.9 F | OXYGEN SATURATION: 95 % | BODY MASS INDEX: 17.99 KG/M2 | WEIGHT: 108 LBS | HEART RATE: 70 BPM | SYSTOLIC BLOOD PRESSURE: 105 MMHG | DIASTOLIC BLOOD PRESSURE: 61 MMHG | HEIGHT: 65 IN

## 2022-08-15 PROCEDURE — 7100000011 HC PHASE II RECOVERY - ADDTL 15 MIN: Performed by: SPECIALIST

## 2022-08-15 PROCEDURE — 3700000001 HC ADD 15 MINUTES (ANESTHESIA): Performed by: SPECIALIST

## 2022-08-15 PROCEDURE — 7100000010 HC PHASE II RECOVERY - FIRST 15 MIN: Performed by: SPECIALIST

## 2022-08-15 PROCEDURE — 6360000002 HC RX W HCPCS

## 2022-08-15 PROCEDURE — 2500000003 HC RX 250 WO HCPCS

## 2022-08-15 PROCEDURE — 3700000000 HC ANESTHESIA ATTENDED CARE: Performed by: SPECIALIST

## 2022-08-15 PROCEDURE — 3600000002 HC SURGERY LEVEL 2 BASE: Performed by: SPECIALIST

## 2022-08-15 PROCEDURE — 3600000012 HC SURGERY LEVEL 2 ADDTL 15MIN: Performed by: SPECIALIST

## 2022-08-15 PROCEDURE — 2580000003 HC RX 258

## 2022-08-15 PROCEDURE — L4361 PNEUMA/VAC WALK BOOT PRE OTS: HCPCS | Performed by: SPECIALIST

## 2022-08-15 PROCEDURE — 2500000003 HC RX 250 WO HCPCS: Performed by: SPECIALIST

## 2022-08-15 PROCEDURE — 2709999900 HC NON-CHARGEABLE SUPPLY: Performed by: SPECIALIST

## 2022-08-15 RX ORDER — PROPOFOL 10 MG/ML
INJECTION, EMULSION INTRAVENOUS PRN
Status: DISCONTINUED | OUTPATIENT
Start: 2022-08-15 | End: 2022-08-15 | Stop reason: SDUPTHER

## 2022-08-15 RX ORDER — SODIUM CHLORIDE 9 MG/ML
INJECTION, SOLUTION INTRAVENOUS CONTINUOUS
Status: DISCONTINUED | OUTPATIENT
Start: 2022-08-15 | End: 2022-08-15 | Stop reason: HOSPADM

## 2022-08-15 RX ORDER — SODIUM CHLORIDE 9 MG/ML
INJECTION, SOLUTION INTRAVENOUS CONTINUOUS PRN
Status: DISCONTINUED | OUTPATIENT
Start: 2022-08-15 | End: 2022-08-15 | Stop reason: SDUPTHER

## 2022-08-15 RX ORDER — LIDOCAINE HYDROCHLORIDE 20 MG/ML
INJECTION, SOLUTION EPIDURAL; INFILTRATION; INTRACAUDAL; PERINEURAL PRN
Status: DISCONTINUED | OUTPATIENT
Start: 2022-08-15 | End: 2022-08-15 | Stop reason: SDUPTHER

## 2022-08-15 RX ORDER — LIDOCAINE HYDROCHLORIDE AND EPINEPHRINE 10; 10 MG/ML; UG/ML
INJECTION, SOLUTION INFILTRATION; PERINEURAL PRN
Status: DISCONTINUED | OUTPATIENT
Start: 2022-08-15 | End: 2022-08-15 | Stop reason: ALTCHOICE

## 2022-08-15 RX ORDER — GLYCOPYRROLATE 1 MG/5 ML
SYRINGE (ML) INTRAVENOUS PRN
Status: DISCONTINUED | OUTPATIENT
Start: 2022-08-15 | End: 2022-08-15 | Stop reason: SDUPTHER

## 2022-08-15 RX ADMIN — PROPOFOL 50 MG: 10 INJECTION, EMULSION INTRAVENOUS at 14:44

## 2022-08-15 RX ADMIN — CEFAZOLIN 2000 MG: 10 INJECTION, POWDER, FOR SOLUTION INTRAVENOUS at 14:47

## 2022-08-15 RX ADMIN — PROPOFOL 50 MG: 10 INJECTION, EMULSION INTRAVENOUS at 14:47

## 2022-08-15 RX ADMIN — PROPOFOL 50 MG: 10 INJECTION, EMULSION INTRAVENOUS at 14:49

## 2022-08-15 RX ADMIN — Medication 0.1 MG: at 14:54

## 2022-08-15 RX ADMIN — PROPOFOL 60 MCG/KG/MIN: 10 INJECTION, EMULSION INTRAVENOUS at 14:45

## 2022-08-15 RX ADMIN — LIDOCAINE HYDROCHLORIDE 100 MG: 20 INJECTION, SOLUTION EPIDURAL; INFILTRATION; INTRACAUDAL; PERINEURAL at 14:44

## 2022-08-15 RX ADMIN — SODIUM CHLORIDE: 9 INJECTION, SOLUTION INTRAVENOUS at 14:41

## 2022-08-15 ASSESSMENT — ENCOUNTER SYMPTOMS
SHORTNESS OF BREATH: 1
SINUS PAIN: 0
CONSTIPATION: 0
BACK PAIN: 1
WHEEZING: 0
ABDOMINAL DISTENTION: 0
ABDOMINAL PAIN: 0
SINUS PRESSURE: 0
PHOTOPHOBIA: 0
TROUBLE SWALLOWING: 0
SORE THROAT: 0
COUGH: 0
DIARRHEA: 0
RHINORRHEA: 0
VOMITING: 0
BLOOD IN STOOL: 0
NAUSEA: 0

## 2022-08-15 ASSESSMENT — LIFESTYLE VARIABLES: SMOKING_STATUS: 1

## 2022-08-15 ASSESSMENT — PAIN - FUNCTIONAL ASSESSMENT: PAIN_FUNCTIONAL_ASSESSMENT: NONE - DENIES PAIN

## 2022-08-15 ASSESSMENT — PAIN SCALES - GENERAL: PAINLEVEL_OUTOF10: 0

## 2022-08-15 NOTE — OP NOTE
Operative Note    Patient name: Helder Gomez             Medical Record Number: [id number]    Primary Care Physician: Colleen Gardner, APRN - CNP     1965    Date of Procedure: 8/15/2022    Pre-operative Diagnosis: 7cm2 defect of left anterior lower leg s/p MOHS for basal cell carcinoma    Post-operative Diagnosis: Same    Procedure Performed: Full thickness skin graft (7 cm2) repair of left leg defect (CPT 80082)    Surgeons/Assistants: MD Sally Burk PA-C/Rafa Bartholomew DPM    Estimated Blood Loss: 3ml     Complications: none immediately appreciated    Procedure: With the patient lying in the supine position and under adequate anesthesia per the anesthesia team.   Local anesthesia consisting of 19 ml of 1% Lidocaine 1:100,000 with epinephrine solution of the donor site of the left inguinal area as well as the left leg 7cm2 defect. The area was prepped and draped in the standard surgical fashion. The patient has very thin skin and a 7cm2 defect which could not be closed primarily, therefore a full thickness skin graft was taken. It was defatted and secured in position with a 3-0 silk suture tie and then a 5-0 fast absorbable suture was placed in a simple running fashion. A Bacitracin Xeroform and moist cotton ball tie over bolster was secured using the tails of the silk sutures. The donor site was closed with a 3-0 Monocryl suture placed in interrupted buried fashion, 2-0 Stratafix in running subcuticular fashion and then Histoacryl respectively. The patient tolerated the procedure well and remained hemodynamically stable throughout the procedure and was quite comfortable throughout the operative course.     Clinical staging for cancer cases:  Ct  Cn  Cm    Dayton Narayan MD  Electronically signed by me on 8/15/2022 at 2:55 PM Operative Note      Patient: Helder Gomez  YOB: 1965  MRN: [id number]    Date of Procedure: 8/15/2022    Pre-Op Diagnosis: BCC (basal cell carcinoma), leg, left [C44.719]    Post-Op Diagnosis: Same       Procedure(s):  MOHS REPAIR BCC LEFT ANTERIOR LOWER LEG    Surgeon(s):  Stephen Comer MD    Assistant:   Physician Assistant: Sherryle Falter, PA-C  Resident: Mya Cunningham DPM    Anesthesia: Monitor Anesthesia Care    Estimated Blood Loss (mL): Minimal    Complications: None    Specimens:   * No specimens in log *    Implants:  * No implants in log *      Drains: * No LDAs found *    Findings: 7cm2 defect of left anterior lower leg s/p MOHS for basal cell carcinoma    Detailed Description of Procedure:   Full thickness skin graft (7 cm2) repair of left leg defect (CPT 24247)    Electronically signed by Stephen Comer MD on 8/15/2022 at 2:54 PM

## 2022-08-15 NOTE — H&P
[address] [address]  History and Physical Update    Pt Name: Russ Mireles  MRN: [id number]  YOB: 1965  Date of evaluation: 8/15/2022    I have examined the patient and reviewed the H&P/Consult and there are no changes to the patient or plans.       Lorie Browning MD  Electronically signed 8/15/2022 at 2:33 PM

## 2022-08-15 NOTE — DISCHARGE INSTRUCTIONS
POST OPERATIVE INSTRUCTION SHEET  SKIN TUMOR/LESION REMOVAL          Activity:    No strenuous activity for 48 hours  No activity that stresses the suture closure/incision  Regular diet  ABSOLUTELY NO NICOTINE OF ANY TYPE    Wound Care:  Leave dressing on lower leg in place. Do not remove. Do not get wet. Wear walking boot when out of bed. You may remove boot at night to sleep. Dermabond was used on your upper thigh, do not scrub, rub or pick at adhesive glue      Limitations:  No swimming, hot tub, sauna or soaking in a bathtub    Prescriptions:   Take exactly as prescribed    Follow-Up:  Follow up in the office with Dr. Gee Members August 23, 2022 at 1:45 pm      Notify our office if you experience any of the following:   Develop a fever (temperature is greater than 100.5F)   Develop redness greater than 1 cm around incision or red streaks up extremity   Have any excess bleeding/ increased drainage or swelling at the incision site    *You may resume Mobic on Wednesday

## 2022-08-15 NOTE — ANESTHESIA POSTPROCEDURE EVALUATION
Department of Anesthesiology  Postprocedure Note    Patient: Jesus Ovalles  MRN: [id number]  YOB: 1965  Date of evaluation: 8/15/2022      Procedure Summary     Date: 08/15/22 Room / Location: [facility] 04 / [facility]    Anesthesia Start: 3391 Anesthesia Stop: 1786    Procedure: MOHS REPAIR BCC LEFT ANTERIOR LOWER LEG WITH FTSG FROM LEFT UPPER THIGH (Left: Face) Diagnosis:       BCC (basal cell carcinoma), leg, left      (BCC (basal cell carcinoma), leg, left [C44.719])    Surgeons: Angela Clay MD Responsible Provider: Colin Bassett DO    Anesthesia Type: MAC ASA Status: 3          Anesthesia Type: No value filed.     Stevenson Phase I:      Stevenson Phase II:        Anesthesia Post Evaluation    Patient location during evaluation: bedside  Patient participation: complete - patient participated  Level of consciousness: awake  Airway patency: patent  Nausea & Vomiting: no nausea  Complications: no  Cardiovascular status: hemodynamically stable  Respiratory status: acceptable  Hydration status: stable

## 2022-08-15 NOTE — ANESTHESIA PRE PROCEDURE
Department of Anesthesiology  Preprocedure Note       Name:  Molly Mckenzie   Age:  59 y.o.  :  1965                                          MRN:  [id number]         Date:  8/15/2022      Surgeon: Nuha Madera):  Abbi Hart MD    Procedure: Procedure(s):  MOHS REPAIR BCC LEFT ANTERIOR LOWER LEG    Medications prior to admission:   Prior to Admission medications    Medication Sig Start Date End Date Taking? Authorizing Provider   OLANZapine (ZYPREXA) 5 MG tablet take 1 tablet by mouth nightly 22   ENMANUEL Meek CNP   buprenorphine-naloxone (SUBOXONE) 8-2 MG FILM SL film Place 1 Film under the tongue in the morning and 1 Film before bedtime. Do all this for 28 days.  8/10/22 9/7/22  ENMANUEL Meek CNP   FLUoxetine (PROZAC) 40 MG capsule take 1 capsule by mouth once daily 22   ENMANUEL Meek CNP   meloxicam DEVANRACHELE [facility]WOOD [facility]) 15 MG tablet take 1 tablet by mouth once daily 22   ENMANUEL Meek CNP   ondansetron [facility]) 4 MG tablet Take 1 tab prn for nausea 22   ENMANUEL Meek CNP   triamterene-hydroCHLOROthiazide [facility]) 75-50 MG per tablet take 1 tablet by mouth once daily 22   ENMANUEL Meek CNP   gabapentin (NEURONTIN) 300 MG capsule take 1 capsule by mouth three times a day 6/22/22 8/15/22  ENMANUEL Meek CNP   Multiple Vitamins-Minerals (THERAPEUTIC MULTIVITAMIN-MINERALS) tablet Take 1 tablet by mouth daily    Historical Provider, MD   Caffeine (VIVARIN) 200 MG TABS Take 200 mg by mouth 3 times daily as needed for Other (OTC for drowsiness)    Historical Provider, MD   albuterol sulfate HFA (VENTOLIN HFA) 108 (90 Base) MCG/ACT inhaler Inhale 2 puffs into the lungs 4 times daily as needed for Wheezing 3/22/22   ENMANUEL Meek CNP   metoprolol tartrate (LOPRESSOR) 25 MG tablet Take 1 tablet by mouth 2 times daily  Patient taking differently: Take by mouth 2 times daily 22   Oumar Conklin, MD   naloxone 4 MG/0.1ML LIQD nasal spray 1 spray by Nasal route as needed for Opioid Reversal 10/6/21   ENMANUEL Hammer - CNP       Current medications:    Current Facility-Administered Medications   Medication Dose Route Frequency Provider Last Rate Last Admin    0.9 % sodium chloride infusion   IntraVENous Continuous Kerwin Blake MD        ceFAZolin (ANCEF) 2000 mg in dextrose 5 % 50 mL IVPB  2,000 mg IntraVENous Once Kerwin Blake MD           Allergies:  No Known Allergies    Problem List:    Patient Active Problem List   Diagnosis Code    Symptomatic bradycardia R00.1    Toxic effect of carbon monoxide, unintentional T58. 91XA    Altered mental status R41.82    Severe malnutrition ([facility] [address] [address] E43       Past Medical History:        Diagnosis Date    COPD (chronic obstructive pulmonary disease) ([facility]     High blood pressure        Past Surgical History:        Procedure Laterality Date    CYST REMOVAL      vagina    HERNIA REPAIR  04/01/2002    LAPAROSCOPY  1994?     TUBAL LIGATION  04/01/2002    WRIST SURGERY Right        Social History:    Social History     Tobacco Use    Smoking status: Every Day     Packs/day: 1.00     Years: 40.00     Pack years: 40.00     Types: Cigarettes     Start date: 1/15/1980    Smokeless tobacco: Never    Tobacco comments:     Less than 1 ppd   Substance Use Topics    Alcohol use: Not Currently                                Ready to quit: Not Answered  Counseling given: Not Answered  Tobacco comments: Less than 1 ppd      Vital Signs (Current):   Vitals:    08/09/22 1625 08/15/22 1300   BP:  (!) 106/58   Pulse:  55   Resp:  16   Temp:  96.9 °F (36.1 °C)   TempSrc:  Temporal   SpO2:  93%   Weight: 110 lb (49.9 kg) 108 lb (49 kg)   Height: 5' 5\" (1.651 m) 5' 5\" (1.651 m)                                              BP Readings from Last 3 Encounters:   08/15/22 (!) 106/58   08/10/22 133/73   08/10/22 138/86       NPO Status: Time of last liquid consumption: 0800 (sip of water with medication )                        Time of last solid consumption: 2030                        Date of last liquid consumption: 08/15/22                        Date of last solid food consumption: 08/14/22    BMI:   Wt Readings from Last 3 Encounters:   08/15/22 108 lb (49 kg)   08/10/22 110 lb (49.9 kg)   08/10/22 110 lb 9.6 oz (50.2 kg)     Body mass index is 17.97 kg/m². CBC:   Lab Results   Component Value Date/Time    WBC 12.9 08/11/2022 12:37 PM    RBC 4.83 08/11/2022 12:37 PM    HGB 15.3 08/11/2022 12:37 PM    HCT 47.7 08/11/2022 12:37 PM    MCV 98.8 08/11/2022 12:37 PM     08/11/2022 12:37 PM       CMP:   Lab Results   Component Value Date/Time     08/11/2022 12:37 PM    K 4.4 08/11/2022 12:37 PM    K 4.0 12/31/2021 04:13 AM    CL 96 08/11/2022 12:37 PM    CO2 29 08/11/2022 12:37 PM    BUN 36 08/11/2022 12:37 PM    CREATININE 1.2 08/11/2022 12:37 PM    LABGLOM 46 08/11/2022 12:37 PM    GLUCOSE 103 08/11/2022 12:37 PM    PROT 7.3 07/25/2022 01:24 PM    CALCIUM 10.2 08/11/2022 12:37 PM    BILITOT 0.2 07/25/2022 01:24 PM    ALKPHOS 70 07/25/2022 01:24 PM    AST 19 07/25/2022 01:24 PM    ALT 15 07/25/2022 01:24 PM       POC Tests: No results for input(s): POCGLU, POCNA, POCK, POCCL, POCBUN, POCHEMO, POCHCT in the last 72 hours.     Coags:   Lab Results   Component Value Date/Time    INR 0.99 12/31/2021 04:13 AM    APTT 33.3 12/31/2021 04:13 AM       HCG (If Applicable): No results found for: PREGTESTUR, PREGSERUM, HCG, HCGQUANT     ABGs: No results found for: PHART, PO2ART, BNR8FOR, KIA9DRN, BEART, Q4XVPXEA     Type & Screen (If Applicable):  No results found for: LABABO, LABRH    Drug/Infectious Status (If Applicable):  No results found for: HIV, HEPCAB    COVID-19 Screening (If Applicable):   Lab Results   Component Value Date/Time    COVID19 NOT  DETECTED 12/27/2021 05:40 PM           Anesthesia Evaluation  Patient summary reviewed and Nursing notes reviewed  Airway: Mallampati: II          Dental:          Pulmonary: breath sounds clear to auscultation  (+) COPD:  current smoker          Patient did not smoke on day of surgery. Cardiovascular:  Exercise tolerance: good (>4 METS),   (+) hypertension:,                   Neuro/Psych:   (+) neuromuscular disease:,             GI/Hepatic/Renal: Neg GI/Hepatic/Renal ROS            Endo/Other: Negative Endo/Other ROS                    Abdominal:             Vascular: negative vascular ROS. Other Findings:           Anesthesia Plan      MAC     ASA 3       Induction: intravenous. Anesthetic plan and risks discussed with patient. Plan discussed with CRNA.                     [address], DO   8/15/2022

## 2022-08-15 NOTE — PROGRESS NOTES
1526 Patient arrived to phase II via cart. Spontaneous respiraitons even and unlabored. Placed on monitor--VSS. Report received from OR RN   1528 Assessment completed. Patient is alert and oriented x4. IV capped off-- no complications. Patient denies pain--will monitor. Incision sites clean and dry. Pt.'s Left lower limb in a walking boot. 1530 Snack and drink given to pt. Warmer  attached to gown for comfort. Pt. Denies all other needs. Call light left within reach. [address] brought to bedside. [address] at bedside. Pt. States readiness to be discharged. 1552 INT removed. No complications noted. 1555 Pt. Standing. Pt. Tolerates activity well and without complaints of weakness or dizziness. [address] [facility] assisted pt. To get dressed. 1600 Discharge instructions reviewed with the pt. And S.O. All questions addressed. 1601 S. O. left to get private vehicle. 1603 Pt. Ambulated to private vehicle in stable condition with Rn at her side.

## 2022-08-25 DIAGNOSIS — I10 HYPERTENSION, UNSPECIFIED TYPE: ICD-10-CM

## 2022-08-26 RX ORDER — TRIAMTERENE AND HYDROCHLOROTHIAZIDE 75; 50 MG/1; MG/1
TABLET ORAL
Qty: 30 TABLET | Refills: 1 | Status: SHIPPED | OUTPATIENT
Start: 2022-08-26 | End: 2022-10-31

## 2022-08-29 DIAGNOSIS — F41.9 ANXIETY AND DEPRESSION: ICD-10-CM

## 2022-08-29 DIAGNOSIS — F32.A ANXIETY AND DEPRESSION: ICD-10-CM

## 2022-08-29 RX ORDER — GABAPENTIN 300 MG/1
CAPSULE ORAL
Qty: 90 CAPSULE | Refills: 1 | OUTPATIENT
Start: 2022-08-29 | End: 2022-09-28

## 2022-08-29 RX ORDER — OLANZAPINE 5 MG/1
5 TABLET ORAL NIGHTLY
Qty: 30 TABLET | Refills: 0 | Status: SHIPPED | OUTPATIENT
Start: 2022-08-29 | End: 2022-09-30

## 2022-09-06 DIAGNOSIS — G89.4 CHRONIC PAIN SYNDROME: ICD-10-CM

## 2022-09-06 RX ORDER — MELOXICAM 15 MG/1
TABLET ORAL
Qty: 30 TABLET | Refills: 0 | Status: SHIPPED | OUTPATIENT
Start: 2022-09-06 | End: 2022-10-10 | Stop reason: SDUPTHER

## 2022-09-07 ENCOUNTER — TELEPHONE (OUTPATIENT)
Dept: INTERNAL MEDICINE CLINIC | Age: 57
End: 2022-09-07

## 2022-09-07 NOTE — TELEPHONE ENCOUNTER
Patient called stated she needed to reschedule her appointment and needed her Suboxone refilled. Suboxone refilled until Monday 9/12/22 per Srinivasa Arriaga CNP. Her appointment is scheduled for 9/12/22 at 11am. Patient aware of appointment and medication called in to pharmacy.

## 2022-09-11 ENCOUNTER — HOSPITAL ENCOUNTER (OUTPATIENT)
Dept: SLEEP CENTER | Age: 57
Discharge: HOME | End: 2022-09-13
Payer: COMMERCIAL

## 2022-09-11 DIAGNOSIS — R53.82 CHRONIC FATIGUE: ICD-10-CM

## 2022-09-11 DIAGNOSIS — G47.10 HYPERSOMNIA: ICD-10-CM

## 2022-09-11 DIAGNOSIS — R06.83 SNORING: ICD-10-CM

## 2022-09-11 DIAGNOSIS — R45.1 RESTLESSNESS: ICD-10-CM

## 2022-09-11 DIAGNOSIS — J44.9 CHRONIC OBSTRUCTIVE PULMONARY DISEASE, UNSPECIFIED COPD TYPE (HCC): ICD-10-CM

## 2022-09-11 PROCEDURE — 95810 POLYSOM 6/> YRS 4/> PARAM: CPT

## 2022-09-12 ENCOUNTER — OFFICE VISIT (OUTPATIENT)
Dept: INTERNAL MEDICINE CLINIC | Age: 57
End: 2022-09-12
Payer: COMMERCIAL

## 2022-09-12 VITALS
HEART RATE: 61 BPM | SYSTOLIC BLOOD PRESSURE: 139 MMHG | RESPIRATION RATE: 20 BRPM | WEIGHT: 116.2 LBS | TEMPERATURE: 98 F | BODY MASS INDEX: 19.36 KG/M2 | DIASTOLIC BLOOD PRESSURE: 96 MMHG | HEIGHT: 65 IN

## 2022-09-12 DIAGNOSIS — G89.4 CHRONIC PAIN SYNDROME: Primary | ICD-10-CM

## 2022-09-12 DIAGNOSIS — F11.21 SEVERE OPIOID USE DISORDER, IN SUSTAINED REMISSION, ON MAINTENANCE THERAPY (HCC): ICD-10-CM

## 2022-09-12 LAB

## 2022-09-12 PROCEDURE — 4004F PT TOBACCO SCREEN RCVD TLK: CPT | Performed by: NURSE PRACTITIONER

## 2022-09-12 PROCEDURE — 3017F COLORECTAL CA SCREEN DOC REV: CPT | Performed by: NURSE PRACTITIONER

## 2022-09-12 PROCEDURE — 99214 OFFICE O/P EST MOD 30 MIN: CPT | Performed by: NURSE PRACTITIONER

## 2022-09-12 PROCEDURE — 80305 DRUG TEST PRSMV DIR OPT OBS: CPT | Performed by: NURSE PRACTITIONER

## 2022-09-12 PROCEDURE — G8427 DOCREV CUR MEDS BY ELIG CLIN: HCPCS | Performed by: NURSE PRACTITIONER

## 2022-09-12 PROCEDURE — G8420 CALC BMI NORM PARAMETERS: HCPCS | Performed by: NURSE PRACTITIONER

## 2022-09-12 RX ORDER — GABAPENTIN 300 MG/1
CAPSULE ORAL
Qty: 90 CAPSULE | Refills: 1 | Status: SHIPPED | OUTPATIENT
Start: 2022-09-12 | End: 2022-11-02

## 2022-09-12 RX ORDER — BUPRENORPHINE AND NALOXONE 8; 2 MG/1; MG/1
1 FILM, SOLUBLE BUCCAL; SUBLINGUAL 2 TIMES DAILY
Qty: 56 FILM | Refills: 0 | Status: SHIPPED | OUTPATIENT
Start: 2022-09-12 | End: 2022-10-10 | Stop reason: SDUPTHER

## 2022-09-12 ASSESSMENT — ENCOUNTER SYMPTOMS
NAUSEA: 0
RHINORRHEA: 0
BACK PAIN: 1
ABDOMINAL DISTENTION: 0
SINUS PAIN: 0
TROUBLE SWALLOWING: 0
WHEEZING: 0
PHOTOPHOBIA: 0
SINUS PRESSURE: 0
ABDOMINAL PAIN: 0
SORE THROAT: 0
SHORTNESS OF BREATH: 1
VOMITING: 0
COUGH: 0
BLOOD IN STOOL: 0
DIARRHEA: 0
CONSTIPATION: 0

## 2022-09-12 NOTE — PROGRESS NOTES
[address] [facility]  [facility] [address]  Dept: [phone]  Dept Fax: [phone]: [phone]     Visit Date:  9/12/2022    Patient:  Joi Toro  YOB: 1965    HPI:     Chief Complaint   Patient presents with    Drug Problem     Rosita Pearson presents today for medical evaluation of severe opioid use disorder, anxiety/depression, weight loss, COPD, tobacco abuse, chronic pain     I last seen her 4 weeks ago. She was hospitalized 12/27-1/3 for symptomatic bradycardia, carbon monoxide poisoning, altered mental status, and severe malnutrition. Is following with cardiology. Had a 30 day event monitor, and she was started on metoprolol. No chest pain or palpitations. BP today in office 139/96, HR 61. Following with pulmonary as well. PFTs completed. Severe COPD. She is a current every day smoker, interested in smoking cessation. Did well on chantix previously, but started smoking again once she finished the starter pack (did not realize that she could continue it). Insurance refuses to cover, and it is too expensive for patient to afford. CT chest completed 1/5/22. There are small nodules, need repeated in 6 months. Agrees to reschedule now. Had sleep study last night. Weight is up today to 116 pounds today. No family history of GI cancers. No significant nausea/vomiting. No blood in her stool. No constipation/diarrhea. Hep C ordered, not yet obtained. Liver enzymes WNL. Colonoscopy 3/15- polyps removed, hemorrhoids noted, and scattered diverticula noted. Urges and cravings controlled with Suboxone 8 mg BID     She denies any use     Urine positive for buprenorphine and THC      Not active in counseling     She had been prescribed opioids by her PCP for several years for chronic pain issues. She has chronic back pain reportedly.  Is scheduled to follow up with pain management. Mobic is helpful. Having a basal cell carcinoma removed from left leg 8/15    Medications    Current Outpatient Medications:     gabapentin (NEURONTIN) 300 MG capsule, take 1 capsule by mouth three times a day, Disp: 90 capsule, Rfl: 1    buprenorphine-naloxone (SUBOXONE) 8-2 MG FILM SL film, Place 1 Film under the tongue 2 times daily for 28 days. , Disp: 56 Film, Rfl: 0    meloxicam (MOBIC) 15 MG tablet, take 1 tablet by mouth once daily, Disp: 30 tablet, Rfl: 0    OLANZapine (ZYPREXA) 5 MG tablet, take 1 tablet by mouth nightly, Disp: 30 tablet, Rfl: 0    triamterene-hydroCHLOROthiazide (MAXZIDE) 75-50 MG per tablet, take 1 tablet by mouth once daily, Disp: 30 tablet, Rfl: 1    FLUoxetine (PROZAC) 40 MG capsule, take 1 capsule by mouth once daily, Disp: 30 capsule, Rfl: 1    ondansetron (ZOFRAN) 4 MG tablet, Take 1 tab prn for nausea, Disp: 30 tablet, Rfl: 0    Multiple Vitamins-Minerals (THERAPEUTIC MULTIVITAMIN-MINERALS) tablet, Take 1 tablet by mouth daily, Disp: , Rfl:     Caffeine (VIVARIN) 200 MG TABS, Take 200 mg by mouth 3 times daily as needed for Other (OTC for drowsiness), Disp: , Rfl:     albuterol sulfate HFA (VENTOLIN HFA) 108 (90 Base) MCG/ACT inhaler, Inhale 2 puffs into the lungs 4 times daily as needed for Wheezing, Disp: 54 g, Rfl: 1    metoprolol tartrate (LOPRESSOR) 25 MG tablet, Take 1 tablet by mouth 2 times daily (Patient taking differently: Take by mouth 2 times daily), Disp: 180 tablet, Rfl: 1    naloxone 4 MG/0.1ML LIQD nasal spray, 1 spray by Nasal route as needed for Opioid Reversal, Disp: 1 each, Rfl: 1    The patient has No Known Allergies. Past Medical History  Thang Bean  has a past medical history of COPD (chronic obstructive pulmonary disease) ([facility] [address] and High blood pressure. Past Surgical History  The patient  has a past surgical history that includes Tubal ligation (04/01/2002); hernia repair (04/01/2002); laparoscopy (1994?);  Wrist surgery (Right); cyst removal; and Mohs surgery (Left, 8/15/2022). Family History  This patient's family history includes Cancer in her father; Diabetes in her mother; Emphysema in her mother; Esophageal Cancer in her father; Heart Disease in her mother. Social History  Renee Schwartz  reports that she has been smoking cigarettes. She started smoking about 42 years ago. She has a 40.00 pack-year smoking history. She has never used smokeless tobacco. She reports that she does not currently use alcohol. She reports that she does not currently use drugs after having used the following drugs: Opiates . Health Maintenance:    Health Maintenance   Topic Date Due    COVID-19 Vaccine (1) Never done    Pneumococcal 0-64 years Vaccine (1 - PCV) Never done    HIV screen  Never done    Hepatitis C screen  Never done    DTaP/Tdap/Td vaccine (1 - Tdap) Never done    Cervical cancer screen  Never done    Lipids  Never done    Colorectal Cancer Screen  Never done    Shingles vaccine (1 of 2) Never done    Breast cancer screen  11/21/2013    Flu vaccine (1) Never done    Low dose CT lung screening  01/05/2023    A1C test (Diabetic or Prediabetic)  07/18/2023    Depression Monitoring  08/10/2023    Hepatitis A vaccine  Aged Out    Hepatitis B vaccine  Aged Out    Hib vaccine  Aged Out    Meningococcal (ACWY) vaccine  Aged Out       Subjective:      Review of Systems   Constitutional:  Positive for fatigue. Negative for chills, fever and unexpected weight change. HENT:  Negative for congestion, dental problem, ear pain, hearing loss, rhinorrhea, sinus pressure, sinus pain, sore throat, tinnitus and trouble swallowing. Eyes:  Negative for photophobia and visual disturbance. Respiratory:  Positive for shortness of breath (on exertion). Negative for cough and wheezing. Cardiovascular:  Negative for chest pain, palpitations and leg swelling.    Gastrointestinal:  Negative for abdominal distention, abdominal pain, blood in stool, constipation, diarrhea, nausea and vomiting. Endocrine: Negative for polydipsia, polyphagia and polyuria. Genitourinary:  Negative for difficulty urinating, dysuria, frequency, hematuria and urgency. Musculoskeletal:  Positive for back pain (chronic). Negative for arthralgias and myalgias. Skin:  Negative for rash and wound. Neurological:  Negative for dizziness, light-headedness and headaches. Psychiatric/Behavioral:  Positive for dysphoric mood. Negative for sleep disturbance. The patient is not nervous/anxious. Objective:     BP (!) 139/96 (Site: Right Lower Arm, Position: Sitting)   Pulse 61   Temp 98 °F (36.7 °C) (Tympanic)   Resp 20   Ht 5' 5\" (1.651 m)   Wt 116 lb 3.2 oz (52.7 kg)   BMI 19.34 kg/m²     Physical Exam  Vitals reviewed. Constitutional:       General: She is not in acute distress. Appearance: Normal appearance. She is not ill-appearing. HENT:      Head: Normocephalic and atraumatic. Right Ear: External ear normal.      Left Ear: External ear normal.      Nose: Nose normal. No congestion or rhinorrhea. Mouth/Throat:      Mouth: Mucous membranes are moist.   Eyes:      Extraocular Movements: Extraocular movements intact. Conjunctiva/sclera: Conjunctivae normal.      Pupils: Pupils are equal, round, and reactive to light. Cardiovascular:      Rate and Rhythm: Normal rate and regular rhythm. Pulses: Normal pulses. Heart sounds: Normal heart sounds. No murmur heard. Pulmonary:      Effort: Pulmonary effort is normal. No respiratory distress. Breath sounds: Examination of the right-lower field reveals decreased breath sounds. Examination of the left-lower field reveals decreased breath sounds. Decreased breath sounds present. No rhonchi. Abdominal:      General: Abdomen is flat. Bowel sounds are normal. There is no distension. Palpations: Abdomen is soft. Musculoskeletal:         General: Normal range of motion.       Cervical back: Normal range of motion and neck supple. Right lower leg: No edema. Left lower leg: No edema. Skin:     General: Skin is warm and dry. Findings: No erythema, lesion or rash. Neurological:      General: No focal deficit present. Mental Status: She is alert and oriented to person, place, and time. Psychiatric:         Mood and Affect: Mood normal.         Behavior: Behavior normal.         Thought Content: Thought content normal.         Judgment: Judgment normal.       Labs Reviewed 9/12/2022:    Lab Results   Component Value Date    WBC 12.9 (H) 08/11/2022    HGB 15.3 08/11/2022    HCT 47.7 (H) 08/11/2022     08/11/2022    ALT 15 07/25/2022    AST 19 07/25/2022     08/11/2022    K 4.4 08/11/2022    CL 96 (L) 08/11/2022    CREATININE 1.2 08/11/2022    BUN 36 (H) 08/11/2022    CO2 29 08/11/2022    TSH 4.350 (H) 07/25/2022    INR 0.99 12/31/2021    LABA1C 5.7 07/18/2022       Assessment/Plan      1. Severe opioid use disorder, in sustained remission, on maintenance therapy (HCC)    - POCT Rapid Drug Screen  - buprenorphine-naloxone (SUBOXONE) 8-2 MG FILM SL film; Place 1 Film under the tongue 2 times daily for 28 days. Dispense: 56 Film; Refill: 0  - Narcan at home  - Labs not yet obtained  - OARRS reviewed, no discrepancies  - Encouraged to attend NA/AA meetings and/or arrange for individualized counseling    2. Chronic pain syndrome    - gabapentin (NEURONTIN) 300 MG capsule; take 1 capsule by mouth three times a day  Dispense: 90 capsule; Refill: 1      Return in about 4 weeks (around 10/10/2022). Patient given educational materials - see patient instructions. Discussed use, benefit, and side effects of prescribed medications. All patient questions answered. Pt voiced understanding. Reviewed health maintenance.        Electronically signed ENMANUEL Kern - CNP on 9/12/22 at 9:23 AM EDT

## 2022-09-14 DIAGNOSIS — G47.33 OSA (OBSTRUCTIVE SLEEP APNEA): Primary | ICD-10-CM

## 2022-09-14 NOTE — PROGRESS NOTES
[address] [place] [address]                               SLEEP STUDY REPORT    PATIENT NAME: Marj Woody                      :        1965  MED REC NO:   [id number]                           ROOM:  ACCOUNT NO:   [de-identified]                           ADMIT DATE: 2022  PROVIDER:     PARKER Mcmahon STUDY:  2022    DIAGNOSTIC POLYSOMNOGRAM REPORT    REFERRING PROVIDER:  Uvaldo Rogers DO    HISTORY OF PRESENT ILLNESS:  The patient is a 20-year-old female with  complaints of excessive daytime somnolence, nonrestorative sleep. METHODS:  The patient underwent digital polysomnography in compliance  with the standards and specifications from the AASM Manual including the  simultaneous recording of 3 EEG channels (F4-M1, C4-M1, and O2-M1 with  back up electrodes F3-M2, C3-M2, and O1-M2), 2 EOG channels (E1-M2, and  E2-M1,), EMG (chin, left & right leg), EKG, Nonin pulse oximetry with   less than 2 second averaging time, body position, airflow recorded by  oral-nasal thermal sensor and nasal air pressure transducer, plus  respiratory effort recorded by calibrated respiratory inductance  plethysmography (RIP), flow volume loop, sound and video. Sleep staging  and scoring followed the standard put forth by the American Academy of  Sleep Medicine and utilized the 1B obstructive hypopnea event  desaturation of 4 percent or greater. DETAILS OF THE STUDY:  Lights out 10:06 p.m., lights on 04:54 a.m. Total recording time 408 minutes, sleep period time 407 minutes, total  sleep time 402 minutes, sleep efficiency 98.6%. Latency to sleep 0.8  minutes, wake after sleep onset 5 minutes. Latency to  minutes. SLEEP STAGING:  The patient slept 22 minutes or 5.5% of total sleep time  in N1 sleep, 340 minutes or 84.5% in N2 sleep, 40.5 minutes or 10.1% in  REM sleep.     RESPIRATORY SUMMARY:  There were 87 obstructive apneas, 1 mixed apnea,  98 obstructive hypopneas for an AHI of 27.7. The REM AHI was 10.4 and  the supine AHI was 10. BODY POSITION SUMMARY:  The patient slept 102 minutes of supine, 300  minutes on the left side. SLEEP DISTURBANCE SUMMARY:  There were 240 arousals, out of which 224  were spontaneous only 16 related to respiratory events for a respiratory  arousal index of 2.4. PERIODIC LIMB MOVEMENT SUMMARY:  There was none. PULSE OXIMETRY SUMMARY:  Mean oxygen saturation during wakefulness  86.9%, during sleep 87.4%. Lowest oxygen saturation 80%, there were 295  minutes of oxygen saturation below 88%. ECG SUMMARY:  Normal sinus rhythm with some technical artifacts. ASSESSMENT:  1.  Obstructive sleep apnea with an AHI of 27.7. 2.  Nocturnal hypoventilation with low oxygen saturations throughout the  recording non necessarily related to  sleep-disordered breathing and  typically seen with preexisting cardiopulmonary disease or obesity. RECOMMENDATIONS:  Due to the severity of the nocturnal oxygen  desaturation in-lab attended titration procedure is recommended to  document correction nocturnal oxygen desaturation. The patient might  require BiPAP.         Anna Hitchcock M.D.    D: 09/13/2022 21:18:39       T: 09/14/2022 17:32:53     [email or web address]  Job#: [id number]     Doc#: [id number]    CC:

## 2022-09-19 ENCOUNTER — HOSPITAL ENCOUNTER (OUTPATIENT)
Dept: PULMONOLOGY | Age: 57
Discharge: HOME | End: 2022-09-19
Payer: COMMERCIAL

## 2022-09-19 DIAGNOSIS — J44.9 CHRONIC OBSTRUCTIVE PULMONARY DISEASE, UNSPECIFIED COPD TYPE (HCC): ICD-10-CM

## 2022-09-19 PROCEDURE — 94060 EVALUATION OF WHEEZING: CPT

## 2022-09-19 PROCEDURE — 94726 PLETHYSMOGRAPHY LUNG VOLUMES: CPT

## 2022-09-19 PROCEDURE — 94729 DIFFUSING CAPACITY: CPT

## 2022-09-20 ENCOUNTER — OFFICE VISIT (OUTPATIENT)
Dept: PULMONOLOGY | Age: 57
End: 2022-09-20
Payer: COMMERCIAL

## 2022-09-20 VITALS
WEIGHT: 119 LBS | HEART RATE: 51 BPM | DIASTOLIC BLOOD PRESSURE: 76 MMHG | SYSTOLIC BLOOD PRESSURE: 130 MMHG | HEIGHT: 65 IN | BODY MASS INDEX: 19.83 KG/M2 | TEMPERATURE: 98.7 F | OXYGEN SATURATION: 90 %

## 2022-09-20 DIAGNOSIS — R53.82 CHRONIC FATIGUE: ICD-10-CM

## 2022-09-20 DIAGNOSIS — F17.200 SMOKER: ICD-10-CM

## 2022-09-20 DIAGNOSIS — G47.33 OSA (OBSTRUCTIVE SLEEP APNEA): Primary | ICD-10-CM

## 2022-09-20 DIAGNOSIS — G47.10 HYPERSOMNIA: ICD-10-CM

## 2022-09-20 DIAGNOSIS — J96.11 CHRONIC RESPIRATORY FAILURE WITH HYPOXIA (HCC): ICD-10-CM

## 2022-09-20 DIAGNOSIS — E43 SEVERE MALNUTRITION (HCC): ICD-10-CM

## 2022-09-20 PROCEDURE — 4004F PT TOBACCO SCREEN RCVD TLK: CPT | Performed by: NURSE PRACTITIONER

## 2022-09-20 PROCEDURE — 99214 OFFICE O/P EST MOD 30 MIN: CPT | Performed by: NURSE PRACTITIONER

## 2022-09-20 PROCEDURE — G8420 CALC BMI NORM PARAMETERS: HCPCS | Performed by: NURSE PRACTITIONER

## 2022-09-20 PROCEDURE — G8427 DOCREV CUR MEDS BY ELIG CLIN: HCPCS | Performed by: NURSE PRACTITIONER

## 2022-09-20 PROCEDURE — 3017F COLORECTAL CA SCREEN DOC REV: CPT | Performed by: NURSE PRACTITIONER

## 2022-09-20 ASSESSMENT — ENCOUNTER SYMPTOMS
WHEEZING: 0
COUGH: 0
NAUSEA: 0
SHORTNESS OF BREATH: 0
VOMITING: 0
DIARRHEA: 0
EYES NEGATIVE: 1
ABDOMINAL PAIN: 0

## 2022-09-20 NOTE — PROGRESS NOTES
[facility]    Emma Zambrano, 59 y.o.  [phone]      Pt of P  Nurses Notes   PSG results  Study Results  Initial Study Date -  9/11/22  AHI -  27.7    TotalEvents - 186  (Apneas  87  Hypopneas 98  Central  0)  LM w/Arousals - 0  Sleep Efficiency - 98.6 % (Total Sleep Time - 402.5 min)  Time with Sats below 88% - 295.2 min  Interval History       Emma Zambrano is a 59 y.o. old female who comes in to review the results of her recent sleep study, to answer questions and to explore options for treatment. she continues to have symptoms of snoring, non restorative sleep, excessive daytime sleepiness. Underlying COPD, supposed to be on 2LPM O2 with walking, she has not been using oxygen     Allergies  No Known Allergies  Meds  Current Outpatient Medications   Medication Sig Dispense Refill    gabapentin (NEURONTIN) 300 MG capsule take 1 capsule by mouth three times a day 90 capsule 1    buprenorphine-naloxone (SUBOXONE) 8-2 MG FILM SL film Place 1 Film under the tongue 2 times daily for 28 days.  56 Film 0    meloxicam (MOBIC) 15 MG tablet take 1 tablet by mouth once daily 30 tablet 0    OLANZapine (ZYPREXA) 5 MG tablet take 1 tablet by mouth nightly 30 tablet 0    triamterene-hydroCHLOROthiazide (MAXZIDE) 75-50 MG per tablet take 1 tablet by mouth once daily 30 tablet 1    FLUoxetine (PROZAC) 40 MG capsule take 1 capsule by mouth once daily 30 capsule 1    ondansetron (ZOFRAN) 4 MG tablet Take 1 tab prn for nausea 30 tablet 0    Multiple Vitamins-Minerals (THERAPEUTIC MULTIVITAMIN-MINERALS) tablet Take 1 tablet by mouth daily      Caffeine (VIVARIN) 200 MG TABS Take 200 mg by mouth 3 times daily as needed for Other (OTC for drowsiness)      albuterol sulfate HFA (VENTOLIN HFA) 108 (90 Base) MCG/ACT inhaler Inhale 2 puffs into the lungs 4 times daily as needed for Wheezing 54 g 1    metoprolol tartrate (LOPRESSOR) 25 MG tablet Take 1 tablet by mouth 2 times daily (Patient taking differently: Take by mouth 2 times daily) 180 tablet 1    naloxone 4 MG/0.1ML LIQD nasal spray 1 spray by Nasal route as needed for Opioid Reversal 1 each 1     No current facility-administered medications for this visit. ROS  Review of Systems   Constitutional:  Positive for fatigue. Negative for activity change, appetite change, chills, fever and unexpected weight change. HENT: Negative. Eyes: Negative. Respiratory:  Negative for cough, shortness of breath and wheezing. Cardiovascular:  Negative for chest pain, palpitations and leg swelling. Gastrointestinal:  Negative for abdominal pain, diarrhea, nausea and vomiting. Genitourinary: Negative. Musculoskeletal: Negative. Skin: Negative. Neurological: Negative. Hematological: Negative. Psychiatric/Behavioral: Negative. Exam  Vitals -  /76 (Site: Left Upper Arm, Position: Sitting, Cuff Size: Medium Adult)   Pulse 51   Temp 98.7 °F (37.1 °C) (Skin)   Ht 5' 5\" (1.651 m)   Wt 119 lb (54 kg)   SpO2 90%   BMI 19.80 kg/m²    Body mass index is 19.8 kg/m². Oxygen level - Room Air  Physical Exam     ASSESSMENT:  1.  Obstructive sleep apnea with an AHI of 27.7. 2.  Nocturnal hypoventilation with low oxygen saturations throughout the  recording non necessarily related to  sleep-disordered breathing and  typically seen with preexisting cardiopulmonary disease or obesity. RECOMMENDATIONS:  Due to the severity of the nocturnal oxygen  desaturation in-lab attended titration procedure is recommended to  document correction nocturnal oxygen desaturation. The patient might  require BiPAP. Assessment   Diagnosis Orders   1. TAN (obstructive sleep apnea)        2. Hypersomnia        3. Chronic fatigue        4. Chronic respiratory failure with hypoxia (HCC)        5. Smoker        6. Severe malnutrition ([address]           Recommendations  I reviewed the sleep study results in detail with patient.    We discussed various treatment options including positional therapy, OAT and PAP therapies along with the benefits and limitations of each. We also discussed the health risks with untreated TAN. Educated  no negative health effects/ risk factors of hypoxia and non compliance with oxygen . Recommend she start using her oxygen as prescribed during the day by Dr Ganesh Weathers MD  Due to the severity of apnea, oxygen desaturation and symptoms, PAP therapies is recommended. Patient agrees to proceed with PAP titration - may need CPAP to BiPAP due to severity of Oxygen desaturation and underlying lung disease   Recommend start wearing oxygen 2LPM NC continuous as night for now until further recommendations after titration study   Keep upcoming appt in pulmonary clinic to review PFT   Educated on proper sleep hygiene measures. 30 minutes was spent on this visit with > 50% being face to face obtaining HPI, examining patient, reviewing test results, educating and coordinating a treatment plan. Follow up 8 weeks after PAP set up with download.   Electronically signed by ENMANUEL Duarte CNP on 9/20/2022 at 1:39 PM

## 2022-09-28 DIAGNOSIS — F32.A ANXIETY AND DEPRESSION: ICD-10-CM

## 2022-09-28 DIAGNOSIS — F41.9 ANXIETY AND DEPRESSION: ICD-10-CM

## 2022-09-28 RX ORDER — FLUOXETINE HYDROCHLORIDE 40 MG/1
CAPSULE ORAL
Qty: 30 CAPSULE | Refills: 1 | Status: SHIPPED | OUTPATIENT
Start: 2022-09-28 | End: 2022-11-07 | Stop reason: SDUPTHER

## 2022-09-28 NOTE — PROGRESS NOTES
[facility]    Patient: Melania Marquez, 59 y.o.   : 1965  10/4/2022    Patient of Dr. Sydney Abreu   Patient presents with    Follow-up     COPD 6 month f/u with PFT 22      Dash Morrison is here for follow up for COPD. Patient was last seen by Dr. Dick Wilson on 3/24/2022. She previously was evaluated by Dr. Cayden Hsieh 10 years ago with COPD diagnosis. She has had inconsistent treatment and has been using available samples from from different providers. She was smoking 1 pack/day at the time of her last appointment. She had a recent hospital admission to the ICU with lethargy, symptomatic bradycardia, elevated CO2 prior to her last appointment. Patient has 6-minute walk test at her last appointment and required 2 L/min with exertion. She has not been wearing her O2 compliantly with exertion. She does wear her O2 while asleep. She did not bring her O2 with her today. She is here with PFT was significant reduction in DLCO compared to PFT completed in January. Overall patient reports respiratory symptoms have been stable since last appointment. Patient reports fair compliance with inhaled medications Wilbertgaby Moy). She admits to forgetting a few doses. Patient using albuterol 1 times per day on average. Patient reports some physical limitation due to respiratory symptoms. Her past medical history is significant for COPD and high blood pressure. COPD  She complains of cough, shortness of breath, sputum production and wheezing. There is no hemoptysis. This is a chronic problem. The current episode started more than 1 year ago. The problem occurs daily. The problem has been unchanged. The cough is productive of sputum (brown). Associated symptoms include dyspnea on exertion, rhinorrhea and sneezing. Pertinent negatives include no appetite change, chest pain, fever, headaches, postnasal drip or weight loss.  Her symptoms are aggravated by minimal activity, strenuous activity and climbing stairs (laundry, carrying groceries). Her symptoms are alleviated by beta-agonist. She reports significant improvement on treatment. Risk factors for lung disease include smoking/tobacco exposure and animal exposure. Her past medical history is significant for COPD. Screening CT chest 1/5/22: minute lung nodules  and GGO in the lingula    PSG completed revealing sleep apnea and low oxygen saturations - evaluated by ENMANUEL Camilo CNP on 9/20/22 and recommended to wear 2 lpm at nighttime and complete CPAP titration study    Allergy medications: over the counter allergy medication as needed - she believes this is cetirizine     MMRC Dyspnea Scale:   0: Dyspneic on strenuous exercise  1: Dyspneic on walking a slight hill  2: Dyspneic on walking level ground; must stop occasionally due to breathlessness  3: Must stop for breathlessness after walking 100 yards or after a few minutes  4: Cannot leave house; breathless on dressing/undressing    MMRC dyspnea score: 3      Progress History:   Since last visit any new medical issues? Yes - new sleep apnea diagnosis  New ER or hospital visits? No  Any new or changes in medicines? No  Using inhalers? Yes Breztri and as needed albuterol   Are they helpful? Yes reports great benefit with Breztri  Any recent exacerbations? No  Last PFT: 9/19/2022-severe COPD and decreased diffusion  Last 6 MWT: 3/24/2022-2 L/min with exertion  Last A1AT: MM    Smoking History:  Current smoker of< 1 PPD with 40 pack year history (started smoking at age 23). She previously smoked 2 PPD. Severe opioid use disorder  Tried Chantix in the past, which helped her quit. 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  (Coal, dust, asbestos, molds ie Hay)  Admits to exposure to farm dust. [place]. Admits to exposure to pets/animals at home. 1 dog   Denies exposure to tuberculosis. Denies history of glaucoma or urinary retention. Denies history of radiation therapy to the chest  Her father had esophageal cancer  Her mother had COPD and had a tracheostomy  Her siblings smoke with no lung disease  Reports spot of basal cell cancer on her leg - removed recently by Dr. Baljinder Valentin     Flu vaccine: has not received this year  Pneumonia vaccine: not vaccinated  COVID-19 vaccine: vaccinated  Past Medical hx   PMH:  Past Medical History:   Diagnosis Date    COPD (chronic obstructive pulmonary disease) ([facility] [address]     High blood pressure      SURGICAL HISTORY:  Past Surgical History:   Procedure Laterality Date    CYST REMOVAL      vagina    HERNIA REPAIR  04/01/2002    LAPAROSCOPY  1994? MOHS SURGERY Left 8/15/2022    MOHS REPAIR BCC LEFT ANTERIOR LOWER LEG WITH FTSG FROM LEFT UPPER THIGH performed by Tamara Wang MD at [address]  04/01/2002    WRIST SURGERY Right      SOCIAL HISTORY:  Social History     Tobacco Use    Smoking status: Every Day     Packs/day: 1.00     Years: 40.00     Pack years: 40.00     Types: Cigarettes     Start date: 1/15/1980    Smokeless tobacco: Never    Tobacco comments:     Less than 1 ppd   Vaping Use    Vaping Use: Never used   Substance Use Topics    Alcohol use: Not Currently    Drug use: Not Currently     Types: Opiates      ALLERGIES:No Known Allergies  FAMILY HISTORY:  Family History   Problem Relation Age of Onset    Diabetes Mother     Heart Disease Mother     Emphysema Mother     Esophageal Cancer Father     Cancer Father     High Blood Pressure Neg Hx     Stroke Neg Hx      CURRENT MEDICATIONS:  Current Outpatient Medications   Medication Sig Dispense Refill    varenicline (CHANTIX STARTING MONTH PAK) 0.5 MG X 11 & 1 MG X 42 tablet Take by mouth.  53 tablet 0    fluticasone (FLONASE) 50 MCG/ACT nasal spray 1 spray by Nasal route daily Spray towards outside of nose, not towards the septum 16 g 3    BREZTRI AEROSPHERE 160-9-4.8 MCG/ACT AERO Inhale 2 puffs into the lungs in the morning and at bedtime      OLANZapine (ZYPREXA) 5 MG tablet take 1 tablet by mouth nightly 30 tablet 0    FLUoxetine (PROZAC) 40 MG capsule take 1 capsule by mouth once daily 30 capsule 1    gabapentin (NEURONTIN) 300 MG capsule take 1 capsule by mouth three times a day 90 capsule 1    buprenorphine-naloxone (SUBOXONE) 8-2 MG FILM SL film Place 1 Film under the tongue 2 times daily for 28 days. 56 Film 0    meloxicam (MOBIC) 15 MG tablet take 1 tablet by mouth once daily 30 tablet 0    triamterene-hydroCHLOROthiazide (MAXZIDE) 75-50 MG per tablet take 1 tablet by mouth once daily 30 tablet 1    ondansetron (ZOFRAN) 4 MG tablet Take 1 tab prn for nausea 30 tablet 0    Multiple Vitamins-Minerals (THERAPEUTIC MULTIVITAMIN-MINERALS) tablet Take 1 tablet by mouth daily      Caffeine (VIVARIN) 200 MG TABS Take 200 mg by mouth 3 times daily as needed for Other (OTC for drowsiness)      albuterol sulfate HFA (VENTOLIN HFA) 108 (90 Base) MCG/ACT inhaler Inhale 2 puffs into the lungs 4 times daily as needed for Wheezing 54 g 1    metoprolol tartrate (LOPRESSOR) 25 MG tablet Take 1 tablet by mouth 2 times daily (Patient taking differently: Take by mouth 2 times daily) 180 tablet 1    naloxone 4 MG/0.1ML LIQD nasal spray 1 spray by Nasal route as needed for Opioid Reversal 1 each 1     No current facility-administered medications for this visit. Benny DE LA CRUZ   Review of Systems   Constitutional:  Negative for appetite change, fever and weight loss. HENT:  Positive for congestion, rhinorrhea, sinus pressure and sneezing. Negative for nosebleeds, postnasal drip and sinus pain. Respiratory:  Positive for cough, sputum production, shortness of breath and wheezing. Negative for hemoptysis. Cardiovascular:  Positive for dyspnea on exertion.  Negative for chest pain, palpitations and leg swelling. Genitourinary:  Negative for difficulty urinating. Allergic/Immunologic: Positive for environmental allergies. Neurological:  Negative for headaches. Physical exam   /60   Pulse 72   Temp 97.9 °F (36.6 °C)   Ht 5' 5\" (1.651 m)   Wt 122 lb 9.6 oz (55.6 kg)   SpO2 91% Comment: room air  BMI 20.40 kg/m²    Wt Readings from Last 3 Encounters:   10/04/22 122 lb 9.6 oz (55.6 kg)   09/20/22 119 lb (54 kg)   09/12/22 116 lb 3.2 oz (52.7 kg)       Physical Exam  Constitutional:       General: She is not in acute distress. Appearance: She is well-developed. Comments: BMI 20.4   HENT:      Head: Normocephalic and atraumatic. Right Ear: External ear normal.      Left Ear: External ear normal.      Mouth/Throat:      Mouth: Mucous membranes are moist.      Pharynx: Oropharynx is clear. No oropharyngeal exudate. Eyes:      General:         Right eye: No discharge. Left eye: No discharge. Cardiovascular:      Rate and Rhythm: Normal rate and regular rhythm. Pulmonary:      Effort: Pulmonary effort is normal. No respiratory distress. Breath sounds: No wheezing, rhonchi or rales. Comments: Diminished breath sounds  Chest:      Chest wall: No tenderness. Musculoskeletal:      Cervical back: Neck supple. Right lower leg: No edema. Left lower leg: No edema. Skin:     General: Skin is warm and dry. Neurological:      General: No focal deficit present. Mental Status: She is alert. Psychiatric:         Mood and Affect: Mood normal.         Behavior: Behavior normal.         Thought Content:  Thought content normal.         Judgment: Judgment normal.        Results   Lung Nodule Screening     [x] Qualifies    [] Does not qualify   [] Declined    [] Completed  40 PY history   The USPSTF recommends annual screening for lung cancer with low-dose computed tomography (LDCT) in adults aged 48 to 80 years who have a 20 pack-year smoking history and currently smoke or have quit within the past 15 years. Screening should be discontinued once a person has not smoked for 15 years or develops a health problem that substantially limits life expectancy or the ability or willingness to have curative lung surgery. Chest x-ray 8/11/22  Narrative   PROCEDURE: XR CHEST (2 VW)       CLINICAL INFORMATION: Basal cell carcinoma of skin of left lower limb. Preoperative exam. Tobacco use. COMPARISON: Chest x-ray 12/28/2021. TECHNIQUE: PA and lateral views of the chest performed. FINDINGS:    Lines/tubes: None. Heart/mediastinum: The heart size is normal. The pulmonary vascularity is unremarkable. Lungs: The lungs remain hyper related. Left upper lobe calcified nodules appear stable. No focal consolidation, pleural effusion, or pneumothorax is observed. Bones: The visualized skeletal structures appear intact. Impression   Pulmonary hyperinflation and chronic findings appear stable. Calcified left upper lobe pulmonary nodules appear unchanged accounting for differences in technique. No acute intrathoracic process is observed. **This report has been created using voice recognition software. It may contain minor errors which are inherent in voice recognition technology. **       Final report electronically signed by Dr Yancy Rodriguez on 8/11/2022 12:44 PM     Assessment      Diagnosis Orders   1. Stage 3 severe COPD by GOLD classification (HCC)  CT CHEST HIGH RESOLUTION      2. Decreased diffusion capacity of lung  CT CHEST HIGH RESOLUTION      3. Chronic respiratory failure with hypoxia (HCC)        4. Smoker  varenicline (CHANTIX STARTING MONTH PAK) 0.5 MG X 11 & 1 MG X 42 tablet      5. Lung nodules  CT CHEST HIGH RESOLUTION      6. Seasonal allergies  fluticasone (FLONASE) 50 MCG/ACT nasal spray    RAST Panel      7. TAN (obstructive sleep apnea)              Plan   1.  Stage 3 severe COPD by GOLD classification ([facility]  -Symptoms are better controlled on Breztri. We will continue at current dose, rinse and spit after use. Advised good compliance with twice daily dosing  -Continue albuterol 2 puffs every 6 hours as needed for shortness of breath or wheezing   -Advised patient to receive flu and pneumonia vaccinations  -Received COVID-19 vaccination    2. Decreased diffusion capacity of lung  -Reviewed PFT testing with patient with significant decline in DLCO  -Obtain HRCT to evaluate for ILD    3. Chronic respiratory failure with hypoxia ([facility]  -Patient is noncompliant with supplemental O2. Advised patient on the dangers and risk of not wearing O2 as prescribed including early onset dementia, cardiac complications, and neurological complications. Advised patient to use as prescribed, 2 L/min with exertion and 2 L/min at a time until patient completes CPAP titration  -Patient's SpO2 on presentation was 84%, however she did improve to 91% after resting. Advised patient be wheeled to her car to prevent desaturation, however patient declined    4. Smoker  -Discussed with patient smoking cessation techniques, reinforced to patient the importance of quitting smoking to prevent further damage to lung function, patient verbalized understanding. (Approximately 3 mins)   -Start varenicline (CHANTIX STARTING MONTH [place]) 0.5 MG X 11 & 1 MG X 42 tablet; Take by mouth. 5. Lung nodules  -We will follow on HRCT    6. Seasonal allergies  -Allergy symptoms are currently not well controlled. Continue over-the-counter cetirizine.  -Start fluticasone (FLONASE) 50 MCG/ACT nasal spray; 1 spray by Nasal route daily [place] towards outside of nose, not towards the septum   -Obtain RAST Panel    7.  TAN (obstructive sleep apnea)  -Advised patient continuing the sleep clinic and keep scheduled CPAP titration    Advised patient to call office with any changes, questions, or concerns regarding respiratory status or issues with prescribed medications    Return in about 1 month (around 11/4/2022) for COPD with decreased DLCO with HRCT prior.        Electronically signed by ENMANUEL Kendall CNP on 10/4/2022 at 12:32 PM     (Please note that portions of this note may have been completed with a voice recognition program. Efforts were made to edit the dictation but occasionally words are mis-transcribed)

## 2022-09-30 DIAGNOSIS — F32.A ANXIETY AND DEPRESSION: ICD-10-CM

## 2022-09-30 DIAGNOSIS — F41.9 ANXIETY AND DEPRESSION: ICD-10-CM

## 2022-10-02 RX ORDER — OLANZAPINE 5 MG/1
5 TABLET ORAL NIGHTLY
Qty: 30 TABLET | Refills: 0 | Status: SHIPPED | OUTPATIENT
Start: 2022-10-02 | End: 2022-10-18

## 2022-10-04 ENCOUNTER — OFFICE VISIT (OUTPATIENT)
Dept: PULMONOLOGY | Age: 57
End: 2022-10-04
Payer: COMMERCIAL

## 2022-10-04 VITALS
HEART RATE: 72 BPM | OXYGEN SATURATION: 91 % | SYSTOLIC BLOOD PRESSURE: 100 MMHG | WEIGHT: 122.6 LBS | TEMPERATURE: 97.9 F | BODY MASS INDEX: 20.43 KG/M2 | DIASTOLIC BLOOD PRESSURE: 60 MMHG | HEIGHT: 65 IN

## 2022-10-04 DIAGNOSIS — R94.2 DECREASED DIFFUSION CAPACITY OF LUNG: ICD-10-CM

## 2022-10-04 DIAGNOSIS — J44.9 STAGE 3 SEVERE COPD BY GOLD CLASSIFICATION (HCC): Primary | ICD-10-CM

## 2022-10-04 DIAGNOSIS — R91.8 LUNG NODULES: ICD-10-CM

## 2022-10-04 DIAGNOSIS — G47.33 OSA (OBSTRUCTIVE SLEEP APNEA): ICD-10-CM

## 2022-10-04 DIAGNOSIS — J30.2 SEASONAL ALLERGIES: ICD-10-CM

## 2022-10-04 DIAGNOSIS — J96.11 CHRONIC RESPIRATORY FAILURE WITH HYPOXIA (HCC): ICD-10-CM

## 2022-10-04 DIAGNOSIS — F17.200 SMOKER: ICD-10-CM

## 2022-10-04 PROCEDURE — 99214 OFFICE O/P EST MOD 30 MIN: CPT

## 2022-10-04 PROCEDURE — 3017F COLORECTAL CA SCREEN DOC REV: CPT

## 2022-10-04 PROCEDURE — G8484 FLU IMMUNIZE NO ADMIN: HCPCS

## 2022-10-04 PROCEDURE — G8420 CALC BMI NORM PARAMETERS: HCPCS

## 2022-10-04 PROCEDURE — 4004F PT TOBACCO SCREEN RCVD TLK: CPT

## 2022-10-04 PROCEDURE — 3023F SPIROM DOC REV: CPT

## 2022-10-04 PROCEDURE — G8427 DOCREV CUR MEDS BY ELIG CLIN: HCPCS

## 2022-10-04 RX ORDER — VARENICLINE TARTRATE 25 MG
KIT ORAL
Qty: 53 TABLET | Refills: 0 | Status: SHIPPED | OUTPATIENT
Start: 2022-10-04 | End: 2022-11-02 | Stop reason: SDUPTHER

## 2022-10-04 RX ORDER — FLUTICASONE PROPIONATE 50 MCG
1 SPRAY, SUSPENSION (ML) NASAL DAILY
Qty: 16 G | Refills: 3 | Status: SHIPPED | OUTPATIENT
Start: 2022-10-04 | End: 2023-10-04

## 2022-10-04 RX ORDER — BUDESONIDE, GLYCOPYRROLATE, AND FORMOTEROL FUMARATE 160; 9; 4.8 UG/1; UG/1; UG/1
2 AEROSOL, METERED RESPIRATORY (INHALATION) 2 TIMES DAILY
COMMUNITY
Start: 2022-09-19 | End: 2022-11-02 | Stop reason: SDUPTHER

## 2022-10-04 ASSESSMENT — ENCOUNTER SYMPTOMS
HEMOPTYSIS: 0
COUGH: 1
RHINORRHEA: 1
WHEEZING: 1
SPUTUM PRODUCTION: 1
SHORTNESS OF BREATH: 1
SINUS PAIN: 0
SINUS PRESSURE: 1

## 2022-10-04 ASSESSMENT — COPD QUESTIONNAIRES: COPD: 1

## 2022-10-04 NOTE — PROGRESS NOTES
Patient is experiencing SOB: Yes    Patient is experiencing wheezing: Yes    Patient states they have had a Weak, non-productive = 3 cough. Phlegm is occasional color dark brown    Patient is coughing up blood: no    Patient has been experiencing chest pains: non-existent    Patient is currently taking the following inhaler(s): Albuterol    Patient is using their rescue inhaler 1 times per day. Patient is currently using 2 liters of oxygen during exertion. Patient needs refills of the following medications: Albuterol    All refills should be sent to [facility]    Other: pt states they had her O2 but she only uses it during sleep.

## 2022-10-04 NOTE — PATIENT INSTRUCTIONS
Continue Breztri, rinse and spit after use. Continue your albuterol inhaler. You may take 2 puffs every 6 hours as needed for shortness of breath or wheezing. Start flonase 1-2 sprays per nostril daily. Please have your bloodwork completed prior to your next appointment. I will see you back in 1 month with your high resolution CT scan.

## 2022-10-06 ENCOUNTER — TELEPHONE (OUTPATIENT)
Dept: PULMONOLOGY | Age: 57
End: 2022-10-06

## 2022-10-06 DIAGNOSIS — F17.200 SMOKER: ICD-10-CM

## 2022-10-06 NOTE — TELEPHONE ENCOUNTER
Patient is calling and said that shes been having trouble getting this filled through [employer] and she contacted her insurance [employer]) and they advised her that she needs to have her provider call this # to order this and they will send it to her at no cost.   # [phone]     Patient is requesting that someone follow up with her on this so she knows if there is any trouble or if she can expect it in the mail or anything.  [phone]

## 2022-10-10 ENCOUNTER — OFFICE VISIT (OUTPATIENT)
Dept: INTERNAL MEDICINE CLINIC | Age: 57
End: 2022-10-10
Payer: COMMERCIAL

## 2022-10-10 VITALS
WEIGHT: 120.2 LBS | BODY MASS INDEX: 20.03 KG/M2 | SYSTOLIC BLOOD PRESSURE: 151 MMHG | HEART RATE: 53 BPM | TEMPERATURE: 98.3 F | DIASTOLIC BLOOD PRESSURE: 100 MMHG | RESPIRATION RATE: 18 BRPM | HEIGHT: 65 IN

## 2022-10-10 DIAGNOSIS — F11.20 SEVERE OPIOID USE DISORDER (HCC): Primary | ICD-10-CM

## 2022-10-10 DIAGNOSIS — T78.40XS ALLERGY, SEQUELA: ICD-10-CM

## 2022-10-10 DIAGNOSIS — G89.4 CHRONIC PAIN SYNDROME: ICD-10-CM

## 2022-10-10 PROCEDURE — 4004F PT TOBACCO SCREEN RCVD TLK: CPT | Performed by: NURSE PRACTITIONER

## 2022-10-10 PROCEDURE — 3017F COLORECTAL CA SCREEN DOC REV: CPT | Performed by: NURSE PRACTITIONER

## 2022-10-10 PROCEDURE — 80305 DRUG TEST PRSMV DIR OPT OBS: CPT | Performed by: NURSE PRACTITIONER

## 2022-10-10 PROCEDURE — 99214 OFFICE O/P EST MOD 30 MIN: CPT | Performed by: NURSE PRACTITIONER

## 2022-10-10 PROCEDURE — G8427 DOCREV CUR MEDS BY ELIG CLIN: HCPCS | Performed by: NURSE PRACTITIONER

## 2022-10-10 PROCEDURE — G8420 CALC BMI NORM PARAMETERS: HCPCS | Performed by: NURSE PRACTITIONER

## 2022-10-10 PROCEDURE — G8484 FLU IMMUNIZE NO ADMIN: HCPCS | Performed by: NURSE PRACTITIONER

## 2022-10-10 RX ORDER — CETIRIZINE HYDROCHLORIDE 10 MG/1
10 TABLET ORAL DAILY
Qty: 30 TABLET | Refills: 0 | Status: SHIPPED | OUTPATIENT
Start: 2022-10-10 | End: 2022-11-09

## 2022-10-10 RX ORDER — MELOXICAM 15 MG/1
TABLET ORAL
Qty: 30 TABLET | Refills: 3 | Status: SHIPPED | OUTPATIENT
Start: 2022-10-10

## 2022-10-10 RX ORDER — BUPRENORPHINE AND NALOXONE 8; 2 MG/1; MG/1
1 FILM, SOLUBLE BUCCAL; SUBLINGUAL 2 TIMES DAILY
Qty: 56 FILM | Refills: 0 | Status: SHIPPED | OUTPATIENT
Start: 2022-10-10 | End: 2022-11-07

## 2022-10-10 ASSESSMENT — PATIENT HEALTH QUESTIONNAIRE - PHQ9
SUM OF ALL RESPONSES TO PHQ QUESTIONS 1-9: 0
SUM OF ALL RESPONSES TO PHQ QUESTIONS 1-9: 0
1. LITTLE INTEREST OR PLEASURE IN DOING THINGS: 0
3. TROUBLE FALLING OR STAYING ASLEEP: 0
5. POOR APPETITE OR OVEREATING: 0
6. FEELING BAD ABOUT YOURSELF - OR THAT YOU ARE A FAILURE OR HAVE LET YOURSELF OR YOUR FAMILY DOWN: 0
SUM OF ALL RESPONSES TO PHQ QUESTIONS 1-9: 0
8. MOVING OR SPEAKING SO SLOWLY THAT OTHER PEOPLE COULD HAVE NOTICED. OR THE OPPOSITE, BEING SO FIGETY OR RESTLESS THAT YOU HAVE BEEN MOVING AROUND A LOT MORE THAN USUAL: 0
10. IF YOU CHECKED OFF ANY PROBLEMS, HOW DIFFICULT HAVE THESE PROBLEMS MADE IT FOR YOU TO DO YOUR WORK, TAKE CARE OF THINGS AT HOME, OR GET ALONG WITH OTHER PEOPLE: 0
7. TROUBLE CONCENTRATING ON THINGS, SUCH AS READING THE NEWSPAPER OR WATCHING TELEVISION: 0
2. FEELING DOWN, DEPRESSED OR HOPELESS: 0
9. THOUGHTS THAT YOU WOULD BE BETTER OFF DEAD, OR OF HURTING YOURSELF: 0
4. FEELING TIRED OR HAVING LITTLE ENERGY: 0
SUM OF ALL RESPONSES TO PHQ9 QUESTIONS 1 & 2: 0
SUM OF ALL RESPONSES TO PHQ QUESTIONS 1-9: 0

## 2022-10-10 NOTE — PROGRESS NOTES
[address] INTERNAL MEDICINE AND MEDICATION MANAGEMENT  [facility] [place] [address]  Dept: [phone]  Dept Fax: [phone]: [phone]     Visit Date:  10/10/2022    Patient:  Judy Miranda  YOB: 1965    HPI:     Chief Complaint   Patient presents with    Drug Problem     Franco Webb presents today for medical evaluation of severe opioid use disorder, anxiety/depression, weight loss, COPD, tobacco abuse, chronic pain     I last seen her 4 weeks ago. She was hospitalized 12/27-1/3 for symptomatic bradycardia, carbon monoxide poisoning, altered mental status, and severe malnutrition. Is following with cardiology. Had a 30 day event monitor, and she was started on metoprolol. No chest pain or palpitations. BP today in office 151/100, HR 53. Has not taken her BP meds yet today. Following with pulmonary as well. PFTs completed. Severe COPD. She is a current every day smoker, interested in smoking cessation. Did well on chantix previously, but started smoking again once she finished the starter pack (did not realize that she could continue it). Insurance refuses to cover, and it is too expensive for patient to afford. CT chest completed 1/5/22. There are small nodules, need repeated in 6 months. Scheduled for 10/21/22. Getting a CPAP. Weight is up today to 116 pounds today. No family history of GI cancers. No significant nausea/vomiting. No blood in her stool. No constipation/diarrhea. Hep C ordered, not yet obtained. Liver enzymes WNL. Colonoscopy 3/15- polyps removed, hemorrhoids noted, and scattered diverticula noted. Urges and cravings controlled with Suboxone 8 mg BID     She denies any use     Urine positive for buprenorphine and THC      Not active in counseling     She had been prescribed opioids by her PCP for several years for chronic pain issues. She has chronic back pain reportedly.  Is scheduled to follow up with pain management. Mobic is helpful. Had a basal cell carcinoma removed from left leg 8/15    Medications    Current Outpatient Medications:     meloxicam (MOBIC) 15 MG tablet, Take one tablet by mouth once daily, Disp: 30 tablet, Rfl: 3    cetirizine (ZYRTEC) 10 MG tablet, Take 1 tablet by mouth daily, Disp: 30 tablet, Rfl: 0    buprenorphine-naloxone (SUBOXONE) 8-2 MG FILM SL film, Place 1 Film under the tongue 2 times daily for 28 days. , Disp: 64 Film, Rfl: 0    varenicline (CHANTIX STARTING MONTH PAK) 0.5 MG X 11 & 1 MG X 42 tablet, Take by mouth., Disp: 53 tablet, Rfl: 0    fluticasone (FLONASE) 50 MCG/ACT nasal spray, 1 spray by Nasal route daily Jackson towards outside of nose, not towards the septum, Disp: 16 g, Rfl: 3    BREZTRI AEROSPHERE 160-9-4.8 MCG/ACT AERO, Inhale 2 puffs into the lungs in the morning and at bedtime, Disp: , Rfl:     FLUoxetine (PROZAC) 40 MG capsule, take 1 capsule by mouth once daily, Disp: 30 capsule, Rfl: 1    gabapentin (NEURONTIN) 300 MG capsule, take 1 capsule by mouth three times a day, Disp: 90 capsule, Rfl: 1    triamterene-hydroCHLOROthiazide (MAXZIDE) 75-50 MG per tablet, take 1 tablet by mouth once daily, Disp: 30 tablet, Rfl: 1    ondansetron (ZOFRAN) 4 MG tablet, Take 1 tab prn for nausea, Disp: 30 tablet, Rfl: 0    Multiple Vitamins-Minerals (THERAPEUTIC MULTIVITAMIN-MINERALS) tablet, Take 1 tablet by mouth daily, Disp: , Rfl:     albuterol sulfate HFA (VENTOLIN HFA) 108 (90 Base) MCG/ACT inhaler, Inhale 2 puffs into the lungs 4 times daily as needed for Wheezing, Disp: 54 g, Rfl: 1    metoprolol tartrate (LOPRESSOR) 25 MG tablet, Take 1 tablet by mouth 2 times daily (Patient taking differently: Take by mouth 2 times daily), Disp: 180 tablet, Rfl: 1    naloxone 4 MG/0.1ML LIQD nasal spray, 1 spray by Nasal route as needed for Opioid Reversal, Disp: 1 each, Rfl: 1    OLANZapine (ZYPREXA) 5 MG tablet, take 1 tablet by mouth nightly, Disp: 30 tablet, Rfl: 0    The patient has No Known Allergies. Past Medical History  Elaina Gr  has a past medical history of COPD (chronic obstructive pulmonary disease) ([address] and High blood pressure. Past Surgical History  The patient  has a past surgical history that includes Tubal ligation (04/01/2002); hernia repair (04/01/2002); laparoscopy (1994?); Wrist surgery (Right); cyst removal; and Mohs surgery (Left, 8/15/2022). Family History  This patient's family history includes Cancer in her father; Diabetes in her mother; Emphysema in her mother; Esophageal Cancer in her father; Heart Disease in her mother. Social History  Elaina Gr  reports that she has been smoking cigarettes. She started smoking about 42 years ago. She has a 40.00 pack-year smoking history. She has never used smokeless tobacco. She reports that she does not currently use alcohol. She reports that she does not currently use drugs after having used the following drugs: Opiates . Health Maintenance:    Health Maintenance   Topic Date Due    Pneumococcal 0-64 years Vaccine (1 - PCV) Never done    HIV screen  Never done    Hepatitis C screen  Never done    DTaP/Tdap/Td vaccine (1 - Tdap) Never done    Cervical cancer screen  Never done    Lipids  Never done    Colorectal Cancer Screen  Never done    Shingles vaccine (1 of 2) Never done    Breast cancer screen  11/21/2013    COVID-19 Vaccine (3 - Booster) 06/03/2021    Flu vaccine (1) Never done    Low dose CT lung screening  01/05/2023    A1C test (Diabetic or Prediabetic)  07/18/2023    Depression Monitoring  10/10/2023    Hepatitis A vaccine  Aged Out    Hib vaccine  Aged Out    Meningococcal (ACWY) vaccine  Aged Out       Subjective:      Review of Systems   Constitutional:  Positive for fatigue. Negative for chills, fever and unexpected weight change.    HENT:  Negative for congestion, dental problem, ear pain, hearing loss, rhinorrhea, sinus pressure, sinus pain, sore throat, tinnitus and trouble swallowing. Eyes:  Negative for photophobia and visual disturbance. Respiratory:  Positive for shortness of breath (on exertion). Negative for cough and wheezing. Cardiovascular:  Negative for chest pain, palpitations and leg swelling. Gastrointestinal:  Negative for abdominal distention, abdominal pain, blood in stool, constipation, diarrhea, nausea and vomiting. Endocrine: Negative for polydipsia, polyphagia and polyuria. Genitourinary:  Negative for difficulty urinating, dysuria, frequency, hematuria and urgency. Musculoskeletal:  Positive for back pain (chronic). Negative for arthralgias and myalgias. Skin:  Negative for rash and wound. Neurological:  Negative for dizziness, light-headedness and headaches. Psychiatric/Behavioral:  Positive for dysphoric mood. Negative for sleep disturbance. The patient is not nervous/anxious. Objective:     BP (!) 151/100 (Site: Right Lower Arm, Position: Sitting)   Pulse 53   Temp 98.3 °F (36.8 °C) (Tympanic)   Resp 18   Ht 5' 5\" (1.651 m)   Wt 120 lb 3.2 oz (54.5 kg)   BMI 20.00 kg/m²     Physical Exam  Vitals reviewed. Constitutional:       General: She is not in acute distress. Appearance: Normal appearance. She is not ill-appearing. HENT:      Head: Normocephalic and atraumatic. Right Ear: External ear normal.      Left Ear: External ear normal.      Nose: Nose normal. No congestion or rhinorrhea. Mouth/Throat:      Mouth: Mucous membranes are moist.   Eyes:      Extraocular Movements: Extraocular movements intact. Conjunctiva/sclera: Conjunctivae normal.      Pupils: Pupils are equal, round, and reactive to light. Cardiovascular:      Rate and Rhythm: Normal rate and regular rhythm. Pulses: Normal pulses. Heart sounds: Normal heart sounds. No murmur heard. Pulmonary:      Effort: Pulmonary effort is normal. No respiratory distress.       Breath sounds: Examination of the right-lower field reveals decreased breath sounds. Examination of the left-lower field reveals decreased breath sounds. Decreased breath sounds present. No rhonchi. Abdominal:      General: Abdomen is flat. Bowel sounds are normal. There is no distension. Palpations: Abdomen is soft. Musculoskeletal:         General: Normal range of motion. Cervical back: Normal range of motion and neck supple. Right lower leg: No edema. Left lower leg: No edema. Skin:     General: Skin is warm and dry. Findings: No erythema, lesion or rash. Neurological:      General: No focal deficit present. Mental Status: She is alert and oriented to person, place, and time. Psychiatric:         Mood and Affect: Mood normal.         Behavior: Behavior normal.         Thought Content: Thought content normal.         Judgment: Judgment normal.       Labs Reviewed 10/10/2022:    Lab Results   Component Value Date    WBC 12.9 (H) 08/11/2022    HGB 15.3 08/11/2022    HCT 47.7 (H) 08/11/2022     08/11/2022    ALT 15 07/25/2022    AST 19 07/25/2022     08/11/2022    K 4.4 08/11/2022    CL 96 (L) 08/11/2022    CREATININE 1.2 08/11/2022    BUN 36 (H) 08/11/2022    CO2 29 08/11/2022    TSH 4.350 (H) 07/25/2022    INR 0.99 12/31/2021    LABA1C 5.7 07/18/2022       Assessment/Plan      1. Severe opioid use disorder (HCC)    - POCT Rapid Drug Screen  - buprenorphine-naloxone (SUBOXONE) 8-2 MG FILM SL film; Place 1 Film under the tongue 2 times daily for 28 days. Dispense: 56 Film; Refill: 0  - Narcan at home  - Labs not yet obtained  - OARRS reviewed, no discrepancies  - Encouraged to attend NA/AA meetings and/or arrange for individualized counseling    2. Chronic pain syndrome    - meloxicam (MOBIC) 15 MG tablet; Take one tablet by mouth once daily  Dispense: 30 tablet; Refill: 3    3. Allergy, sequela    - cetirizine (ZYRTEC) 10 MG tablet; Take 1 tablet by mouth daily  Dispense: 30 tablet;  Refill: 0      Return in about 4 weeks (around 11/7/2022). Patient given educational materials - see patient instructions. Discussed use, benefit, and side effects of prescribed medications. All patient questions answered. Pt voiced understanding. Reviewed health maintenance.        Electronically signed ENMANUEL Cleeste CNP on 10/10/22 at 11:22 AM EDT

## 2022-10-11 NOTE — TELEPHONE ENCOUNTER
Staff, can we clarify with patient if she wants the [facility]? When I search that phone number listed it is [facility]. The brand Chantix is recalled, however some pharmacies have been able to get non-recalled generic Chantix. Is she having a hard time getting the medication from [facility] due to the recall or because it is not covered? Thanks!

## 2022-10-18 DIAGNOSIS — F32.A ANXIETY AND DEPRESSION: ICD-10-CM

## 2022-10-18 DIAGNOSIS — F41.9 ANXIETY AND DEPRESSION: ICD-10-CM

## 2022-10-18 RX ORDER — OLANZAPINE 5 MG/1
5 TABLET ORAL NIGHTLY
Qty: 30 TABLET | Refills: 0 | Status: SHIPPED | OUTPATIENT
Start: 2022-10-18 | End: 2022-11-07 | Stop reason: SDUPTHER

## 2022-10-20 ENCOUNTER — HOSPITAL ENCOUNTER (OUTPATIENT)
Dept: SLEEP CENTER | Age: 57
Discharge: HOME | End: 2022-10-22
Payer: COMMERCIAL

## 2022-10-20 DIAGNOSIS — G47.33 OSA (OBSTRUCTIVE SLEEP APNEA): ICD-10-CM

## 2022-10-20 PROCEDURE — 95811 POLYSOM 6/>YRS CPAP 4/> PARM: CPT

## 2022-10-21 ENCOUNTER — HOSPITAL ENCOUNTER (OUTPATIENT)
Dept: CT IMAGING | Age: 57
Discharge: HOME | End: 2022-10-21
Payer: COMMERCIAL

## 2022-10-21 DIAGNOSIS — R94.2 DECREASED DIFFUSION CAPACITY OF LUNG: ICD-10-CM

## 2022-10-21 DIAGNOSIS — R91.8 LUNG NODULES: ICD-10-CM

## 2022-10-21 DIAGNOSIS — J44.9 STAGE 3 SEVERE COPD BY GOLD CLASSIFICATION (HCC): ICD-10-CM

## 2022-10-21 LAB — STATUS: NORMAL

## 2022-10-21 PROCEDURE — 71250 CT THORAX DX C-: CPT

## 2022-10-21 ASSESSMENT — ENCOUNTER SYMPTOMS
RHINORRHEA: 0
NAUSEA: 0
SINUS PAIN: 0
COUGH: 0
PHOTOPHOBIA: 0
SHORTNESS OF BREATH: 1
TROUBLE SWALLOWING: 0
CONSTIPATION: 0
VOMITING: 0
BLOOD IN STOOL: 0
WHEEZING: 0
SINUS PRESSURE: 0
ABDOMINAL DISTENTION: 0
BACK PAIN: 1
SORE THROAT: 0
DIARRHEA: 0
ABDOMINAL PAIN: 0

## 2022-10-21 NOTE — PROGRESS NOTES
Title:  CPAP/BiLevel Titration's    Approved by:  Tahla Workman MD      Approval Date:  December, 2019 Next Review:  December, 2021     Responsible Party:  Miguelina Serna Institution/Entities Applies to:   [facility] Number:  None    Document Type:  Such as Guideline, Policy, Policy & Procedure, or Procedure, Instructions  Manual:  Policy and Procedures    Section: IV Policy Start Date: October, 8152           POLICY: Positive airway pressure device is used to treat patients with sleep related breathing disorders characterized by full or partial occlusion of the upper airway during sleep. A patient must have undergone polysomnography and diagnosed with obstructive sleep apnea. All individuals who record sleep studies must follow best practices for CPAP/bilevel/ASV titrations in order to attain the ideal pressure setting for their patients. Too low of pressure may cause patients to either be sub-optimally treated or to wake up in a panic. Too much pressure may cause the patient to experience bloating or mask leakage. Determining the appropriate pressure setting for each patient will lead to improved adherence and outcome. Titrations are not an exact science, and it is understood that technologists may need to make minor changes for individual patients. The procedures outlined below are meant to be a guideline and follow the spirit of the AASM clinical guidelines. PROCEDURE:    CPAP:    Review the patients pertinent medical al history, previous sleep study or studies to ass the severity of sleep disordered breathing. Review of pertinent information will help to attain a better titration.    Applications of electrodes, montages, filters, sensitivities and scoring will be performed according to the current version of the AASM Scoring Manual.   Prior to initiating study collect all appropriate PAP supplies  Tubing   Humidifier (filled with distilled water)  Masks   The technologist should assess and measure patient for most appropriate mask prior to start of study. CPAP should be initiated at 5 cm H20. More pressure at start of study may be necessary if patient is morbidly obese or unable to fall asleep on a pressure of 5 cm H20   If apneas or frequent hypopneas are present, pressure settings should be increased by 2 cm H20. If occasional hypopneas, snoring, or mask flow limitation are present, pressure settings should be increased by 1 cm H20 and maintained for at least fie minutes to determine if events improve or resolve. Pressure settings may need to be increased more quickly during REM sleep given the limited amount of REM during sleep and the need to treat events during this stage. If a mask leak occurs, the tech should first fix the leakage before raising the pressure. Otherwise, the final pressure setting chosen for the patient may be too high. Once the mask leak has been fixed, decrease the pressure to the last setting where mouth breathing and/or mask leakage was not present, and then re-titrate as indicated. Make sure to document directly on the study the steps taken to resolve the leak and the type of masks used. Pressure setting usually do not need to be set as high with a nasal-mask than with a full-face mask. The recording technologist should document directly on the study at least every 30 minutes. If the patient takes a break from wearing the mask, do not decrease the CPAP pressure on attempted return to sleep unless the patient remains awake for 15 minutes or the patient specifically requests that the pressure be lowered. Do not raise pressure for central apneas. If the patient develops central apneas, pressure setting may need to be lowered.    If the patient is unable to tolerate CPAP secondary due to:  Persistent mouth breathing despite use of a full-face mask/chin strap  Inability to exhale against higher expiratory pressures (typically beginning anywhere from 15 to 20 cm of H20. Has frequent central apneas, the use of bilevel positive airway pressure may be indicated. Document directly on study why the patient is being switched from CPAP to bilevel. Ensure that supine sleep has been seen on the chosen setting. Going above the chosen setting 1 or 2 cm H20 to show range may be helpful to ensure that the correct pressure has been established. BiLEVEL:    Technologist may change from CPAP to bilevel during a study if proven the patient is unable to tolerate CPAP. Review the patients pertinent medical al history, previous sleep study or studies to ass the severity of sleep disordered breathing. Review of pertinent information will help to attain a better titration. Applications of electrodes, montages, filters, sensitivities and scoring will be performed according to the current version of the AASM Scoring Manual.   Prior to initiating study collect all appropriate PAP supplies  Tubing   Humidifier (filled with distilled water)  Masks   The technologist should assess and measure patient for most appropriate mask prior to start of study. If the patient has not previously been on CPAP, beginning pressures should be 8/4 cm H20 or higher if patient is morbidly obese or unable to fall asleep at lower pressures. If the patient has been previously successfully treated on CPAP start expiratory pressure at therapeutic setting and set inspiratory pressure 4 cm H20 higher. If advancing from CPAP to bilevel during a study expiratory pressure should be set at most successful CPAP setting and inspiratory pressure set 4 cm h20 higher. The standard differential pressure utilized during bilevel titrations typically ranges from 3 to 5 cm H20, with 4 cm H20 being the most common. Higher differential pressures may be needed in patients who are morbidly obese or who have neuromuscular diseases.    If apneas or frequent hypopneas are present, inspiratory and expiratory pressure settings should be increased by 2 cm H20. If occasional hypopneas, snoring, or mask flow limitation are present inspiratory and expiratory pressures settings should be increased by 1 cm h20 and maintained for at least 5 minutes to determine if events improve or resolve. Pressure settings may need to be increased more quickly during REM sleep given the limited amount of REM during sleep and the need to treat events during this stage. If a mask leak occurs, the tech should first fix the leakage before raising the pressure. Otherwise, the final pressure setting chosen for the patient may be too high. Once the mask leak has been fixed, decrease the pressure to the last setting where mouth breathing and/or mask leakage was not present, and then re-titrate as indicated. Make sure to document directly on the study the steps taken to resolve the leak and the type of masks used. Pressure setting usually do not need to be set as high with a nasal-mask than with a full-face mask. The recording technologist should document directly on the study at least every 30 minutes. If the patient takes a break from wearing the mask, do not decrease the CPAP pressure on attempted return to sleep unless the patient remains awake for 15 minutes or the patient specifically requests that the pressure be lowered. Do not raise pressure for central apneas. If the patient develops central apneas, pressure setting may need to be lowered. If the patient has central apneas on bilevel, the use of spontaneous (ST) mode may be indicated. ST mode may only be used in 2 cases  An order for ST mode with a primary diagnosis of central sleep apnea  During a titration if obstructive events are less than 5/ hour and centrals must be greater than 50% of total respiratory events. Ensure that supine sleep has been seen on the chosen setting.  Going above the chosen setting 1 or 2 cm H20 to show range may be helpful to ensure that the correct pressure has been established.

## 2022-10-24 ENCOUNTER — TELEPHONE (OUTPATIENT)
Dept: PULMONOLOGY | Age: 57
End: 2022-10-24

## 2022-10-24 NOTE — TELEPHONE ENCOUNTER
Please notify patient that her HRCT revealed a new consolidative opacity in her left lower lobe that may represent infection or an inflammatory process. Please see if patient is having any shortness of breath, wheezing, coughing, phlegm productive, fevers, chills, etc.     Patient also previously had questions regarding her Chantix but looks like we were unable to contact her. Please also check on Chantix to see if she is wanting [facility]. See telephone encounter dated 10/6/22. Thanks!

## 2022-10-24 NOTE — PROGRESS NOTES
[facility]    Patient: Megan Panda, 59 y.o.   : 1965    Patient of Dr. Amalia Wells   Patient presents with    Follow-up     1 month pulmonary follow up with CT, 10/21/22. Shanthi Anderson is here for follow up for COPD and HRCT follow up. Patient was prescribed Chantix at her last appointment, however she was not able to get this at [address] [place] [address] and is requesting this to be sent to [facility]. She is here with HRCT that revealed interval development of consolidative opacities in LLL. Patient is to wear 2 lpm with exertion and while asleep and reports poor compliance with supplemental O2 use. Overall patient reports respiratory symptoms have been stable since last appointment. Patient reports good compliance with inhaled medications Learhenrique Serrano). Patient using albuterol 2 times per day on average. Patient reports physical limitation due to respiratory symptoms. Her past medical history is significant for COPD and high blood pressure. COPD  She complains of cough, shortness of breath, sputum production and wheezing. There is no chest tightness or hemoptysis. This is a chronic problem. The current episode started more than 1 year ago. The cough is productive of sputum. Associated symptoms include dyspnea on exertion and rhinorrhea. Pertinent negatives include no appetite change, chest pain, fever, postnasal drip or sneezing. Her symptoms are aggravated by minimal activity. Her symptoms are alleviated by beta-agonist. She reports significant improvement on treatment. Risk factors for lung disease include smoking/tobacco exposure. Her past medical history is significant for COPD.        Screening CT chest 22: minute lung nodules  and GGO in the lingula     PSG completed revealing sleep apnea and low oxygen saturations - evaluated by ENMANUEL Romo CNP on 22 and recommended to wear 2 lpm at nighttime and complete CPAP titration study. Order placed for CPAP 12 cm H2O by Dr. Mc Mondragon on 10/30/22. Allergy medications: Zyrtec and Flonase    Progress History:   Since last visit any new medical issues? No  New ER or hospital visits? No  Any new or changes in medicines? No  Using inhalers? Yes Breztri and as needed albuterol  Are they helpful? Yes   Any recent exacerbations? No  Last PFT: 9/19/2022-severe COPD and decreased diffusion  Last 6 MWT: 3/24/2022- 2 L/min with exertion  Last A1AT: MM     Smoking History:  Current smoker of 1 PPD with 40 pack year history (started smoking at age 23). She previously smoked 2 PPD. Severe opioid use disorder  Tried Chantix in the past, which helped her quit. 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 (Coal, dust, asbestos, molds ie Hay)  Admits to exposure to farm dust. [place]. Admits to exposure to pets/animals at home. 1 dog   Denies exposure to tuberculosis. Denies history of glaucoma or urinary retention. Denies history of radiation therapy to the chest  Her father had esophageal cancer  Her mother had COPD and had a tracheostomy  Her siblings smoke with no lung disease  Reports spot of basal cell cancer on her leg - removed recently by Dr. Alda Peters      Flu vaccine: has not received   Pneumonia vaccine: not vaccinated  COVID-19 vaccine: vaccinated  Past Medical hx   PMH:  Past Medical History:   Diagnosis Date    COPD (chronic obstructive pulmonary disease) ([address]     High blood pressure      SURGICAL HISTORY:  Past Surgical History:   Procedure Laterality Date    CYST REMOVAL      vagina    HERNIA REPAIR  04/01/2002    LAPAROSCOPY  1994?     MOHS SURGERY Left 8/15/2022    MOHS REPAIR BCC LEFT ANTERIOR LOWER LEG WITH FTSG FROM LEFT UPPER THIGH performed by Robina Rosado MD at [address] [place] [address] LIGATION  04/01/2002    WRIST SURGERY Right      SOCIAL HISTORY:  Social History     Tobacco Use    Smoking status: Every Day     Packs/day: 1.00     Years: 40.00     Pack years: 40.00     Types: Cigarettes     Start date: 1/15/1980    Smokeless tobacco: Never    Tobacco comments:     Less than 1 ppd   Vaping Use    Vaping Use: Never used   Substance Use Topics    Alcohol use: Not Currently    Drug use: Not Currently     Types: Opiates      ALLERGIES:No Known Allergies  FAMILY HISTORY:  Family History   Problem Relation Age of Onset    Diabetes Mother     Heart Disease Mother     Emphysema Mother     Esophageal Cancer Father     Cancer Father     High Blood Pressure Neg Hx     Stroke Neg Hx      CURRENT MEDICATIONS:  Current Outpatient Medications   Medication Sig Dispense Refill    varenicline (CHANTIX STARTING MONTH PAK) 0.5 MG X 11 & 1 MG X 42 tablet Take by mouth. 53 tablet 0    BREZTRI AEROSPHERE 160-9-4.8 MCG/ACT AERO Inhale 2 puffs into the lungs in the morning and at bedtime 10.7 g 11    doxycycline hyclate (VIBRAMYCIN) 100 MG capsule Take 1 capsule by mouth 2 times daily for 7 days 14 capsule 0    predniSONE (DELTASONE) 10 MG tablet Take 4tab for 3days, then 3tab for 3days, then 2tab for 3days, [id number] for 3days . 30 tablet 0    triamterene-hydroCHLOROthiazide (MAXZIDE) 75-50 MG per tablet take 1 tablet by mouth once daily 30 tablet 1    metoprolol tartrate (LOPRESSOR) 25 MG tablet Take 1 tablet by mouth 2 times daily 180 tablet 3    OLANZapine (ZYPREXA) 5 MG tablet take 1 tablet by mouth nightly 30 tablet 0    meloxicam (MOBIC) 15 MG tablet Take one tablet by mouth once daily 30 tablet 3    cetirizine (ZYRTEC) 10 MG tablet Take 1 tablet by mouth daily 30 tablet 0    buprenorphine-naloxone (SUBOXONE) 8-2 MG FILM SL film Place 1 Film under the tongue 2 times daily for 28 days.  56 Film 0    fluticasone (FLONASE) 50 MCG/ACT nasal spray 1 spray by Nasal route daily Carmichaels towards outside of nose, not towards the septum 16 g 3    FLUoxetine (PROZAC) 40 MG capsule take 1 capsule by mouth once daily 30 capsule 1    gabapentin (NEURONTIN) 300 MG capsule take 1 capsule by mouth three times a day 90 capsule 1    ondansetron (ZOFRAN) 4 MG tablet Take 1 tab prn for nausea 30 tablet 0    Multiple Vitamins-Minerals (THERAPEUTIC MULTIVITAMIN-MINERALS) tablet Take 1 tablet by mouth daily      albuterol sulfate HFA (VENTOLIN HFA) 108 (90 Base) MCG/ACT inhaler Inhale 2 puffs into the lungs 4 times daily as needed for Wheezing 54 g 1    naloxone 4 MG/0.1ML LIQD nasal spray 1 spray by Nasal route as needed for Opioid Reversal 1 each 1     No current facility-administered medications for this visit. [facility]   Review of Systems   Constitutional:  Negative for appetite change and fever. HENT:  Positive for congestion and rhinorrhea. Negative for nosebleeds, postnasal drip, sinus pressure, sinus pain and sneezing. Respiratory:  Positive for cough, sputum production, shortness of breath and wheezing. Negative for hemoptysis and chest tightness. Cardiovascular:  Positive for dyspnea on exertion and leg swelling (on diuretics). Negative for chest pain and palpitations. Genitourinary:  Positive for difficulty urinating. Allergic/Immunologic: Positive for environmental allergies. Zyrtec and Flonase      Physical exam   /72 (Site: Left Upper Arm, Position: Sitting, Cuff Size: Medium Adult)   Pulse 50   Ht 5' 5\" (1.651 m)   Wt 124 lb 6.4 oz (56.4 kg)   SpO2 93% Comment: Patient on room air  BMI 20.70 kg/m²    Wt Readings from Last 3 Encounters:   11/02/22 124 lb 6.4 oz (56.4 kg)   10/10/22 120 lb 3.2 oz (54.5 kg)   10/04/22 122 lb 9.6 oz (55.6 kg)       Physical Exam  Constitutional:       General: She is not in acute distress. Comments: BMI 20.7   HENT:      Head: Normocephalic and atraumatic.       Right Ear: External ear normal.      Left Ear: External ear normal.      Mouth/Throat:      Mouth: Mucous membranes are moist.      Pharynx: No oropharyngeal exudate or posterior oropharyngeal erythema. Eyes:      General:         Right eye: No discharge. Left eye: No discharge. Cardiovascular:      Rate and Rhythm: Normal rate and regular rhythm. Pulmonary:      Effort: Pulmonary effort is normal. No respiratory distress. Breath sounds: Wheezing present. No rhonchi or rales. Chest:      Chest wall: No tenderness. Musculoskeletal:      Cervical back: Neck supple. Skin:     General: Skin is warm and dry. Neurological:      General: No focal deficit present. Mental Status: She is alert. Psychiatric:         Mood and Affect: Mood normal.         Behavior: Behavior normal.         Thought Content: Thought content normal.        Results   Lung Nodule Screening     [x] Qualifies    [] Does not qualify   [] Declined    [] Completed  40 PY history   The USPSTF recommends annual screening for lung cancer with low-dose computed tomography (LDCT) in adults aged 48 to 80 years who have a 20 pack-year smoking history and currently smoke or have quit within the past 15 years. Screening should be discontinued once a person has not smoked for 15 years or develops a health problem that substantially limits life expectancy or the ability or willingness to have curative lung surgery. HRCT 10/21/2022  Narrative   PROCEDURE: CT CHEST HIGH RESOLUTION       CLINICAL INFORMATION: Stage 3 severe COPD by GOLD classification ([address] Lung nodules, Decreased diffusion capacity of lung        COMPARISON: CT chest 1/5/2022       TECHNIQUE:    High-resolution CT of the chest was obtained without contrast. Multiplanar reformats are provided. All CT scans at this facility use dose modulation, iterative reconstruction, and/or weight based dosing when appropriate to reduce the radiation dose to as low as reasonably achievable.            FINDINGS:        The noncontrast CT limits the evaluation for solid organ pathology, vascular pathology, and lymphadenopathy. The previously seen 1.6 cm groundglass nodule in the right upper lobe has resolved. A 4 mm right upper lobe pulmonary nodule is stable (series 2, image 16). A 5 mm right upper lobe pulmonary nodule (image 24) is stable. A 4 mm right upper lobe pulmonary    nodule is stable (image 27). A stable 6 mm pulmonary nodule in the left upper lobe (image 18). A stable 2 mm pulmonary nodule in the left upper lobe (image 19). There are stable calcified nodules in the left lung that are felt to relate to calcified    granulomas. A 3 mm pulmonary nodule in the superior segment of the left lower lobe. There is an area of amorphous consolidated shown in the left lower lobe. Platelike atelectasis in the right middle lobe and lingula. No air trapping noted. Emphysematous changes are noted. No large pulmonary mass. Central airway is patent. No pleural effusions. No significant pericardial effusion. The heart is not enlarged. Ascending thoracic aorta is not dilated. The main pulmonary artery is not dilated. Aortocoronary calcifications. No significantly enlarged mediastinal or  axillary lymph node. The thyroid is not enlarged. No chest wall mass. Limited evaluation below the diaphragm show a 1.3 cm hypodensity in the right hepatic lobe. Bones: Degenerative changes of the thoracic spine. Levoscoliosis. .               Impression   1. There has been interval development of amorphous consolidative opacities in the left lower lobe. In the acute phase, these likely relates to underlying infectious/inflammatory process. A repeat CT of the chest should be obtained for weeks following    completion of treatment. 2. The previously seen 1.6 cm groundglass nodule in the right upper lobe has resolved. 3. Multiple bilateral pulmonary nodules measuring 6 mm or less are noted; most are stable.        4. A 1.3 cm hypodensity is seen in the right hepatic lobe that cannot be characterized on this study. A CT of the abdomen without and with contrast (using liver mass protocol) is recommended for further evaluation. **This report has been created using voice recognition software. It may contain minor errors which are inherent in voice recognition technology. **       Final report electronically signed by Dr Zenaida Johnson on 10/21/2022 3:12 PM                   Assessment      Diagnosis Orders   1. Consolidation of left lower lobe of lung (HCC)  doxycycline hyclate (VIBRAMYCIN) 100 MG capsule    CT CHEST WO CONTRAST    predniSONE (DELTASONE) 10 MG tablet      2. Stage 3 severe COPD by GOLD classification (HCC)  BREZTRI AEROSPHERE 160-9-4.8 MCG/ACT AERO    predniSONE (DELTASONE) 10 MG tablet      3. Chronic respiratory failure with hypoxia (HCC)        4. Seasonal allergies        5. Smoker  varenicline (CHANTIX STARTING MONTH PAK) 0.5 MG X 11 & 1 MG X 42 tablet      6. Difficulty in urination        7. TAN (obstructive sleep apnea)              Plan   1. Consolidation of left lower lobe of lung ([address]  -Start doxycycline hyclate (VIBRAMYCIN) 100 MG capsule; Take 1 capsule by mouth 2 times daily for 7 days   -Start predniSONE (DELTASONE) 10 MG tablet; Take 4tab for 3days, then 3tab for 3days, then 2tab for 3days, [id number] for 3days  -Repeat  CT CHEST WO CONTRAST in 6 weeks to evaluate for clearance post treatment    2. Stage 3 severe COPD by GOLD classification ([address]  -Patient continues to report significant dyspnea with exertion. Strongly advised tobacco cessation to prevent further decline in pulmonary status  -Continue BREZTRI AEROSPHERE 160-9-4.8 MCG/ACT AERO; Inhale 2 puffs into the lungs in the morning and at bedtime.  [facility]  -Continue albuterol 2 puffs every 6 hours as needed for shortness of breath or wheezing   -Advised pulmonary rehab, patient declines  -Advised flu and pneumonia vaccination  -Received COVID-19 vaccination    3. Chronic respiratory failure with hypoxia ([address]  -Patient admits to non-compliance with supplemental O2 use. Advised good compliance with 2 lpm with exertion and while asleep. Advised patient of the dangers of long standing hypoxia, including dementia, cardiac complications, etc. Patient verbalized understanding  -Advised patient to monitor her SpO2 levels at home    4. Seasonal allergies  -Symptoms currently well controlled on Zyrtec and Flonase. Continue current allergy regimen    5. Smoker  -Advised patient to quit and offered support. -Prescribed varenicline. Potential side effects and safe use reviewed. -Discussed with patient smoking cessation techniques including patches and gum patient reports has used in the past and did not help, reinforced to patient the importance of quitting smoking to prevent further damage to lung function, patient verbalized understanding. (Approximately 3 mins)     6. Difficulty with urination  -Offered referral to urology. Patient declines referral to urology at this time for difficulty with urination   - Advised to maintain pneumonia vaccine with PCP and to take flu vaccine this coming season. 7. TAN  -Will have my staff fax order for CPAP to [address] patient to follow with her primary care provider for scheduled CT Abdomen    Advised patient to call office with any changes, questions, or concerns regarding respiratory status or issues with prescribed medications    Return in about 6 weeks (around 12/14/2022) for abnormal CT with CT Chest prior.        Electronically signed by ENMANUEL Phan CNP on 11/2/2022 at 12:27 PM     (Please note that portions of this note may have been completed with a voice recognition program. Efforts were made to edit the dictation but occasionally words are mis-transcribed)

## 2022-10-27 ENCOUNTER — TELEPHONE (OUTPATIENT)
Dept: INTERNAL MEDICINE CLINIC | Age: 57
End: 2022-10-27

## 2022-10-27 DIAGNOSIS — R93.89 ABNORMAL CHEST CT: Primary | ICD-10-CM

## 2022-10-27 NOTE — TELEPHONE ENCOUNTER
----- Message from ENMANUEL Overton CNP sent at 10/24/2022 10:02 AM EDT -----  Please order a CT of the abdomen with and without contrast (using liver mass protocol) and inform patient

## 2022-10-27 NOTE — TELEPHONE ENCOUNTER
Patient has f/u scheduled with you 11/2/22, would you like to discuss these results at patient follow up?

## 2022-10-27 NOTE — TELEPHONE ENCOUNTER
Patient called back and gave her update and about new order for the CT of abdomen. Patient voiced understanding.

## 2022-10-30 DIAGNOSIS — G47.33 OSA (OBSTRUCTIVE SLEEP APNEA): Primary | ICD-10-CM

## 2022-10-30 DIAGNOSIS — I10 HYPERTENSION, UNSPECIFIED TYPE: ICD-10-CM

## 2022-10-31 RX ORDER — TRIAMTERENE AND HYDROCHLOROTHIAZIDE 75; 50 MG/1; MG/1
TABLET ORAL
Qty: 30 TABLET | Refills: 1 | Status: SHIPPED | OUTPATIENT
Start: 2022-10-31 | End: 2022-11-04

## 2022-10-31 NOTE — PROGRESS NOTES
[address] [place] [address]                               SLEEP STUDY REPORT    PATIENT NAME: Hossein Silver              :        1965  MED REC NO:   [id number]                           ROOM:  ACCOUNT NO:   [de-identified]                           ADMIT DATE: 10/20/2022  PROVIDER:     PARKER Duffy OF STUDY:  10/20/2022    PAP TITRATION POLYSOMNOGRAM    REFERRING PROVIDER:  Krista Salazar CNP    HISTORY OF PRESENT ILLNESS:  The patient has a recent diagnosis of  obstructive sleep apnea. She was brought back to the sleep lab for PAP  titration polysomnogram.  Weight 118 pounds, height 65 inches, BMI 19.8. METHODS:  The patient underwent digital polysomnography in compliance  with the standards and specifications from the AASM Manual including the  simultaneous recording of 3 EEG channels (F4-M1, C4-M1, and O2-M1 with  back up electrodes F3-M2, C3-M2, and O1-M2), 2 EOG channels (E1-M2, and  E2-M1,), EMG (chin, left & right leg), EKG, Nonin pulse oximetry with   less than 2 second averaging time, body position, airflow recorded by  oral-nasal thermal sensor and nasal air pressure transducer, plus  respiratory effort recorded by calibrated respiratory inductance  plethysmography (RIP), flow volume loop, sound and video. Sleep staging  and scoring followed the standard put forth by the American Academy of  Sleep Medicine and utilized the 1B obstructive hypopnea event  desaturation of 4 percent or greater. DETAILS OF THE STUDY:  Lights out 09:10 p.m., lights on 04:55 a.m. Total recording time 464 minutes, sleep period time 427 minutes, total  sleep time 371 minutes, sleep efficiency 79.9%. Latency to sleep 37  minutes. Wake after sleep onset 56 minutes. Latency to   minutes.     SLEEP STAGIN minutes or 11.9% of total sleep time in N1 sleep, 294  minutes or 79% in N2 sleep, 1.5 minutes or 0.4% in N3 sleep, 31 minutes  or 8.3% in REM sleep. BODY POSITION SUMMARY:  272 minutes supine sleep, 98.5 minutes left  lateral sleep. PERIODIC LIMB MOVEMENT SUMMARY:  There were 49 events for a PLM index of  3. ECG SUMMARY:  Normal sinus rhythm. PAP TITRATION SUMMARY:  CPAP was started at 5 cm of water pressure and  slowly increased to maximal pressure of 13. The obstructive events were  controlled with CPAP of 12. At this pressure, the patient was monitored  for 1 hour and 52 minutes. There were 31 minutes of REM sleep and 1  hour and 31 minutes of non-REM sleep. There were 2 obstructive events  and 1 hypopnea for an AHI of 1.7. Mean oxygen saturation at this  pressure was 89.5%. ASSESSMENT:  1.  Obstructive sleep apnea. 2.  Successful PAP titration procedure. RECOMMENDATIONS:  To initiate CPAP 12 cm of water pressure. Chosen  interface was an F20 small size full face mask. Followup in the sleep  clinic in eight weeks after initiating PAP treatment to review of  download.         Antoni Cintron M.D.    D: 10/30/2022 20:01:05       T: 10/31/2022 17:51:46     [email or web address]  Job#: [id number]     Doc#: [id number]    CC:

## 2022-11-02 ENCOUNTER — OFFICE VISIT (OUTPATIENT)
Dept: PULMONOLOGY | Age: 57
End: 2022-11-02
Payer: COMMERCIAL

## 2022-11-02 VITALS
BODY MASS INDEX: 20.73 KG/M2 | SYSTOLIC BLOOD PRESSURE: 112 MMHG | HEART RATE: 50 BPM | HEIGHT: 65 IN | WEIGHT: 124.4 LBS | OXYGEN SATURATION: 93 % | DIASTOLIC BLOOD PRESSURE: 72 MMHG

## 2022-11-02 DIAGNOSIS — G47.33 OSA (OBSTRUCTIVE SLEEP APNEA): ICD-10-CM

## 2022-11-02 DIAGNOSIS — R39.198 DIFFICULTY IN URINATION: ICD-10-CM

## 2022-11-02 DIAGNOSIS — J30.2 SEASONAL ALLERGIES: ICD-10-CM

## 2022-11-02 DIAGNOSIS — J96.11 CHRONIC RESPIRATORY FAILURE WITH HYPOXIA (HCC): ICD-10-CM

## 2022-11-02 DIAGNOSIS — J44.9 STAGE 3 SEVERE COPD BY GOLD CLASSIFICATION (HCC): ICD-10-CM

## 2022-11-02 DIAGNOSIS — J18.1 CONSOLIDATION OF LEFT LOWER LOBE OF LUNG (HCC): Primary | ICD-10-CM

## 2022-11-02 DIAGNOSIS — F17.200 SMOKER: ICD-10-CM

## 2022-11-02 PROCEDURE — G8484 FLU IMMUNIZE NO ADMIN: HCPCS

## 2022-11-02 PROCEDURE — 99406 BEHAV CHNG SMOKING 3-10 MIN: CPT

## 2022-11-02 PROCEDURE — 4004F PT TOBACCO SCREEN RCVD TLK: CPT

## 2022-11-02 PROCEDURE — G8420 CALC BMI NORM PARAMETERS: HCPCS

## 2022-11-02 PROCEDURE — 3023F SPIROM DOC REV: CPT

## 2022-11-02 PROCEDURE — 3017F COLORECTAL CA SCREEN DOC REV: CPT

## 2022-11-02 PROCEDURE — G8427 DOCREV CUR MEDS BY ELIG CLIN: HCPCS

## 2022-11-02 PROCEDURE — 99214 OFFICE O/P EST MOD 30 MIN: CPT

## 2022-11-02 RX ORDER — DOXYCYCLINE HYCLATE 100 MG/1
100 CAPSULE ORAL 2 TIMES DAILY
Qty: 14 CAPSULE | Refills: 0 | Status: SHIPPED | OUTPATIENT
Start: 2022-11-02 | End: 2022-11-09

## 2022-11-02 RX ORDER — BUDESONIDE, GLYCOPYRROLATE, AND FORMOTEROL FUMARATE 160; 9; 4.8 UG/1; UG/1; UG/1
2 AEROSOL, METERED RESPIRATORY (INHALATION) 2 TIMES DAILY
Qty: 10.7 G | Refills: 11 | Status: SHIPPED | OUTPATIENT
Start: 2022-11-02

## 2022-11-02 RX ORDER — PREDNISONE 10 MG/1
TABLET ORAL
Qty: 30 TABLET | Refills: 0 | Status: SHIPPED | OUTPATIENT
Start: 2022-11-02 | End: 2022-11-12

## 2022-11-02 ASSESSMENT — ENCOUNTER SYMPTOMS
WHEEZING: 1
RHINORRHEA: 1
ALLERGIC/IMMUNOLOGIC COMMENTS: ZYRTEC AND FLONASE
COUGH: 1
SINUS PAIN: 0
SPUTUM PRODUCTION: 1
SINUS PRESSURE: 0
CHEST TIGHTNESS: 0
HEMOPTYSIS: 0
SHORTNESS OF BREATH: 1

## 2022-11-02 ASSESSMENT — COPD QUESTIONNAIRES: COPD: 1

## 2022-11-02 NOTE — PATIENT INSTRUCTIONS
A good oxygen level for you is 89-94%. A good heart rate is  bpm.    Make sure to get [id number] pneumonia vaccination and your flu shot.

## 2022-11-04 DIAGNOSIS — I10 HYPERTENSION, UNSPECIFIED TYPE: ICD-10-CM

## 2022-11-04 RX ORDER — TRIAMTERENE AND HYDROCHLOROTHIAZIDE 75; 50 MG/1; MG/1
TABLET ORAL
Qty: 30 TABLET | Refills: 1 | Status: SHIPPED | OUTPATIENT
Start: 2022-11-04

## 2022-11-07 ENCOUNTER — HOSPITAL ENCOUNTER (OUTPATIENT)
Dept: CT IMAGING | Age: 57
Discharge: HOME | End: 2022-11-07
Payer: COMMERCIAL

## 2022-11-07 ENCOUNTER — OFFICE VISIT (OUTPATIENT)
Dept: INTERNAL MEDICINE CLINIC | Age: 57
End: 2022-11-07
Payer: COMMERCIAL

## 2022-11-07 VITALS
HEIGHT: 65 IN | BODY MASS INDEX: 20.99 KG/M2 | WEIGHT: 126 LBS | DIASTOLIC BLOOD PRESSURE: 77 MMHG | HEART RATE: 59 BPM | SYSTOLIC BLOOD PRESSURE: 136 MMHG

## 2022-11-07 DIAGNOSIS — I10 HYPERTENSION, UNSPECIFIED TYPE: Primary | ICD-10-CM

## 2022-11-07 DIAGNOSIS — F32.A ANXIETY AND DEPRESSION: ICD-10-CM

## 2022-11-07 DIAGNOSIS — R93.89 ABNORMAL CHEST CT: ICD-10-CM

## 2022-11-07 DIAGNOSIS — F41.9 ANXIETY AND DEPRESSION: ICD-10-CM

## 2022-11-07 DIAGNOSIS — T78.40XS ALLERGY, SEQUELA: ICD-10-CM

## 2022-11-07 DIAGNOSIS — F11.20 SEVERE OPIOID USE DISORDER (HCC): ICD-10-CM

## 2022-11-07 DIAGNOSIS — G89.4 CHRONIC PAIN SYNDROME: ICD-10-CM

## 2022-11-07 PROCEDURE — G8484 FLU IMMUNIZE NO ADMIN: HCPCS | Performed by: NURSE PRACTITIONER

## 2022-11-07 PROCEDURE — 3074F SYST BP LT 130 MM HG: CPT | Performed by: NURSE PRACTITIONER

## 2022-11-07 PROCEDURE — 3078F DIAST BP <80 MM HG: CPT | Performed by: NURSE PRACTITIONER

## 2022-11-07 PROCEDURE — G8427 DOCREV CUR MEDS BY ELIG CLIN: HCPCS | Performed by: NURSE PRACTITIONER

## 2022-11-07 PROCEDURE — 4004F PT TOBACCO SCREEN RCVD TLK: CPT | Performed by: NURSE PRACTITIONER

## 2022-11-07 PROCEDURE — G8420 CALC BMI NORM PARAMETERS: HCPCS | Performed by: NURSE PRACTITIONER

## 2022-11-07 PROCEDURE — 80305 DRUG TEST PRSMV DIR OPT OBS: CPT | Performed by: NURSE PRACTITIONER

## 2022-11-07 PROCEDURE — 6360000004 HC RX CONTRAST MEDICATION: Performed by: NURSE PRACTITIONER

## 2022-11-07 PROCEDURE — 3017F COLORECTAL CA SCREEN DOC REV: CPT | Performed by: NURSE PRACTITIONER

## 2022-11-07 PROCEDURE — 99214 OFFICE O/P EST MOD 30 MIN: CPT | Performed by: NURSE PRACTITIONER

## 2022-11-07 PROCEDURE — 74178 CT ABD&PLV WO CNTR FLWD CNTR: CPT

## 2022-11-07 RX ORDER — GABAPENTIN 300 MG/1
CAPSULE ORAL
Qty: 90 CAPSULE | Refills: 1 | Status: SHIPPED | OUTPATIENT
Start: 2022-11-07 | End: 2022-12-28

## 2022-11-07 RX ORDER — FLUOXETINE HYDROCHLORIDE 40 MG/1
CAPSULE ORAL
Qty: 30 CAPSULE | Refills: 1 | Status: SHIPPED | OUTPATIENT
Start: 2022-11-07

## 2022-11-07 RX ORDER — BUPRENORPHINE AND NALOXONE 8; 2 MG/1; MG/1
1 FILM, SOLUBLE BUCCAL; SUBLINGUAL 2 TIMES DAILY
Qty: 56 FILM | Refills: 0 | Status: SHIPPED | OUTPATIENT
Start: 2022-11-07 | End: 2022-12-05

## 2022-11-07 RX ORDER — CETIRIZINE HYDROCHLORIDE 10 MG/1
10 TABLET ORAL DAILY
Qty: 30 TABLET | Refills: 0 | Status: SHIPPED | OUTPATIENT
Start: 2022-11-07 | End: 2022-12-07

## 2022-11-07 RX ORDER — OLANZAPINE 5 MG/1
5 TABLET ORAL NIGHTLY
Qty: 30 TABLET | Refills: 0 | Status: SHIPPED | OUTPATIENT
Start: 2022-11-07 | End: 2022-11-16 | Stop reason: SDUPTHER

## 2022-11-07 RX ADMIN — IOPAMIDOL 100 ML: 755 INJECTION, SOLUTION INTRAVENOUS at 13:39

## 2022-11-07 ASSESSMENT — ENCOUNTER SYMPTOMS
BLOOD IN STOOL: 0
TROUBLE SWALLOWING: 0
DIARRHEA: 0
VOMITING: 0
ABDOMINAL DISTENTION: 0
SORE THROAT: 0
SINUS PAIN: 0
RHINORRHEA: 0
BACK PAIN: 1
COUGH: 0
SHORTNESS OF BREATH: 1
ABDOMINAL PAIN: 0
SINUS PRESSURE: 0
CONSTIPATION: 0
NAUSEA: 0
PHOTOPHOBIA: 0
WHEEZING: 0

## 2022-11-07 NOTE — PROGRESS NOTES
[address] INTERNAL MEDICINE AND MEDICATION MANAGEMENT  [facility] [address]  Dept: [phone]  Dept Fax: [phone]: [phone]     Visit Date:  11/7/2022    Patient:  Vanessa Grimaldo  YOB: 1965    HPI:     Chief Complaint   Patient presents with    Drug Problem        Belen Alvarado presents today for medical evaluation of severe opioid use disorder, anxiety/depression, weight loss, COPD, tobacco abuse, chronic pain     I last seen her 4 weeks ago. CT scheduled today, as there was a lesion noted on the liver on previous CT of the chest     She was hospitalized 12/27-1/3 for symptomatic bradycardia, carbon monoxide poisoning, altered mental status, and severe malnutrition. Is following with cardiology. Had a 30 day event monitor, and she was started on metoprolol. No chest pain or palpitations. BP today in office 136/77, HR 59. Has not taken her BP meds yet today. Following with pulmonary as well. PFTs completed. Severe COPD. She is a current every day smoker, interested in smoking cessation. Did well on chantix previously, but started smoking again once she finished the starter pack (did not realize that she could continue it). Insurance refuses to cover, and it is too expensive for patient to afford. Pulmonary is working on it. Getting a CPAP. Weight is up today to 126 pounds today. No family history of GI cancers. No significant nausea/vomiting. No blood in her stool. No constipation/diarrhea. Hep C ordered, not yet obtained. Liver enzymes WNL. Colonoscopy 3/15- polyps removed, hemorrhoids noted, and scattered diverticula noted. Urges and cravings controlled with Suboxone 8 mg BID     She denies any use     Urine positive for buprenorphine and THC      Not active in counseling     She had been prescribed opioids by her PCP for several years for chronic pain issues.  She has chronic back pain reportedly. Is scheduled to follow up with pain management. Mobic is helpful. Had a basal cell carcinoma removed from left leg 8/15     Medications    Current Outpatient Medications:     gabapentin (NEURONTIN) 300 MG capsule, take 1 capsule by mouth three times a day, Disp: 90 capsule, Rfl: 1    FLUoxetine (PROZAC) 40 MG capsule, take 1 capsule by mouth once daily, Disp: 30 capsule, Rfl: 1    buprenorphine-naloxone (SUBOXONE) 8-2 MG FILM SL film, Place 1 Film under the tongue 2 times daily for 28 days. , Disp: 56 Film, Rfl: 0    cetirizine (ZYRTEC) 10 MG tablet, Take 1 tablet by mouth daily, Disp: 30 tablet, Rfl: 0    OLANZapine (ZYPREXA) 5 MG tablet, Take 1 tablet by mouth nightly, Disp: 30 tablet, Rfl: 0    triamterene-hydroCHLOROthiazide (MAXZIDE) 75-50 MG per tablet, take 1 tablet by mouth once daily, Disp: 30 tablet, Rfl: 1    BREZTRI AEROSPHERE 160-9-4.8 MCG/ACT AERO, Inhale 2 puffs into the lungs in the morning and at bedtime, Disp: 10.7 g, Rfl: 11    doxycycline hyclate (VIBRAMYCIN) 100 MG capsule, Take 1 capsule by mouth 2 times daily for 7 days, Disp: 14 capsule, Rfl: 0    predniSONE (DELTASONE) 10 MG tablet, Take 4tab for 3days, then 3tab for 3days, then 2tab for 3days, [id number] for 3days . , Disp: 30 tablet, Rfl: 0    metoprolol tartrate (LOPRESSOR) 25 MG tablet, Take 1 tablet by mouth 2 times daily, Disp: 180 tablet, Rfl: 3    meloxicam (MOBIC) 15 MG tablet, Take one tablet by mouth once daily, Disp: 30 tablet, Rfl: 3    fluticasone (FLONASE) 50 MCG/ACT nasal spray, 1 spray by Nasal route daily [place] towards outside of nose, not towards the septum, Disp: 16 g, Rfl: 3    ondansetron (ZOFRAN) 4 MG tablet, Take 1 tab prn for nausea, Disp: 30 tablet, Rfl: 0    Multiple Vitamins-Minerals (THERAPEUTIC MULTIVITAMIN-MINERALS) tablet, Take 1 tablet by mouth daily, Disp: , Rfl:     albuterol sulfate HFA (VENTOLIN HFA) 108 (90 Base) MCG/ACT inhaler, Inhale 2 puffs into the lungs 4 times daily as needed for Wheezing, Disp: 54 g, Rfl: 1    varenicline (CHANTIX STARTING MONTH [place]) 0.5 MG X 11 & 1 MG X 42 tablet, Take by mouth. (Patient not taking: Reported on 11/7/2022), Disp: 53 tablet, Rfl: 0    naloxone 4 MG/0.1ML LIQD nasal spray, 1 spray by Nasal route as needed for Opioid Reversal (Patient not taking: Reported on 11/7/2022), Disp: 1 each, Rfl: 1    The patient has No Known Allergies. Past Medical History  Jacky Milton  has a past medical history of COPD (chronic obstructive pulmonary disease) ([facility] [address] and High blood pressure. Past Surgical History  The patient  has a past surgical history that includes Tubal ligation (04/01/2002); hernia repair (04/01/2002); laparoscopy (1994?); Wrist surgery (Right); cyst removal; and Mohs surgery (Left, 8/15/2022). Family History  This patient's family history includes Cancer in her father; Diabetes in her mother; Emphysema in her mother; Esophageal Cancer in her father; Heart Disease in her mother. Social History  Jacky Milton  reports that she has been smoking cigarettes. She started smoking about 42 years ago. She has a 40.00 pack-year smoking history. She has never used smokeless tobacco. She reports that she does not currently use alcohol. She reports that she does not currently use drugs after having used the following drugs: Opiates .     Health Maintenance:    Health Maintenance   Topic Date Due    Pneumococcal 0-64 years Vaccine (1 - PCV) Never done    HIV screen  Never done    Hepatitis C screen  Never done    DTaP/Tdap/Td vaccine (1 - Tdap) Never done    Cervical cancer screen  Never done    Lipids  Never done    Colorectal Cancer Screen  Never done    Shingles vaccine (1 of 2) Never done    Breast cancer screen  11/21/2013    COVID-19 Vaccine (3 - Booster) 06/03/2021    Flu vaccine (1) Never done    Low dose CT lung screening  01/05/2023    A1C test (Diabetic or Prediabetic)  07/18/2023    Depression Monitoring  10/10/2023    Hepatitis A vaccine  Aged Out Hib vaccine  Aged Out    Meningococcal (ACWY) vaccine  Aged Out       Subjective:      Review of Systems   Constitutional:  Positive for fatigue. Negative for chills, fever and unexpected weight change. HENT:  Negative for congestion, dental problem, ear pain, hearing loss, rhinorrhea, sinus pressure, sinus pain, sore throat, tinnitus and trouble swallowing. Eyes:  Negative for photophobia and visual disturbance. Respiratory:  Positive for shortness of breath (on exertion). Negative for cough and wheezing. Cardiovascular:  Negative for chest pain, palpitations and leg swelling. Gastrointestinal:  Negative for abdominal distention, abdominal pain, blood in stool, constipation, diarrhea, nausea and vomiting. Endocrine: Negative for polydipsia, polyphagia and polyuria. Genitourinary:  Negative for difficulty urinating, dysuria, frequency, hematuria and urgency. Musculoskeletal:  Positive for back pain (chronic). Negative for arthralgias and myalgias. Skin:  Negative for rash and wound. Neurological:  Negative for dizziness, light-headedness and headaches. Psychiatric/Behavioral:  Positive for dysphoric mood. Negative for sleep disturbance. The patient is not nervous/anxious. Objective:     /77 (Site: Right Lower Arm, Position: Sitting, Cuff Size: Medium Adult)   Pulse 59   Ht 5' 5\" (1.651 m)   Wt 126 lb (57.2 kg)   BMI 20.97 kg/m²     Physical Exam  Vitals reviewed. Constitutional:       General: She is not in acute distress. Appearance: Normal appearance. She is not ill-appearing. HENT:      Head: Normocephalic and atraumatic. Right Ear: External ear normal.      Left Ear: External ear normal.      Nose: Nose normal. No congestion or rhinorrhea. Mouth/Throat:      Mouth: Mucous membranes are moist.   Eyes:      Extraocular Movements: Extraocular movements intact.       Conjunctiva/sclera: Conjunctivae normal.      Pupils: Pupils are equal, round, and reactive to light. Cardiovascular:      Rate and Rhythm: Normal rate and regular rhythm. Pulses: Normal pulses. Heart sounds: Normal heart sounds. No murmur heard. Pulmonary:      Effort: Pulmonary effort is normal. No respiratory distress. Breath sounds: Examination of the right-lower field reveals decreased breath sounds. Examination of the left-lower field reveals decreased breath sounds. Decreased breath sounds present. No rhonchi. Abdominal:      General: Abdomen is flat. Bowel sounds are normal. There is no distension. Palpations: Abdomen is soft. Musculoskeletal:         General: Normal range of motion. Cervical back: Normal range of motion and neck supple. Right lower leg: No edema. Left lower leg: No edema. Skin:     General: Skin is warm and dry. Findings: No erythema, lesion or rash. Neurological:      General: No focal deficit present. Mental Status: She is alert and oriented to person, place, and time. Psychiatric:         Mood and Affect: Mood normal.         Behavior: Behavior normal.         Thought Content: Thought content normal.         Judgment: Judgment normal.       Labs Reviewed 11/7/2022:    Lab Results   Component Value Date    WBC 12.9 (H) 08/11/2022    HGB 15.3 08/11/2022    HCT 47.7 (H) 08/11/2022     08/11/2022    ALT 15 07/25/2022    AST 19 07/25/2022     08/11/2022    K 4.4 08/11/2022    CL 96 (L) 08/11/2022    CREATININE 1.2 08/11/2022    BUN 36 (H) 08/11/2022    CO2 29 08/11/2022    TSH 4.350 (H) 07/25/2022    INR 0.99 12/31/2021    LABA1C 5.7 07/18/2022       Assessment/Plan      1. Hypertension, unspecified type    - Follow with cardiology as scheduled  - Continue current medication regimen     2. Severe opioid use disorder (HCC)    - POCT Rapid Drug Screen  - buprenorphine-naloxone (SUBOXONE) 8-2 MG FILM SL film; Place 1 Film under the tongue 2 times daily for 28 days.   Dispense: 56 Film; Refill: 0  - Narcan at home  - Labs not yet obtained  - OARRS reviewed, no discrepancies  - Encouraged to attend NA/AA meetings and/or arrange for individualized counseling     3. Chronic pain syndrome    - gabapentin (NEURONTIN) 300 MG capsule; take 1 capsule by mouth three times a day  Dispense: 90 capsule; Refill: 1    4. Anxiety and depression    - FLUoxetine (PROZAC) 40 MG capsule; take 1 capsule by mouth once daily  Dispense: 30 capsule; Refill: 1  - OLANZapine (ZYPREXA) 5 MG tablet; Take 1 tablet by mouth nightly  Dispense: 30 tablet; Refill: 0    5. Allergy, sequela    - cetirizine (ZYRTEC) 10 MG tablet; Take 1 tablet by mouth daily  Dispense: 30 tablet; Refill: 0      Return in about 4 weeks (around 12/5/2022). Patient given educational materials - see patient instructions. Discussed use, benefit, and side effects of prescribed medications. All patient questions answered. Pt voiced understanding. Reviewed health maintenance.        Electronically signed ENMANUEL Alves - CNP on 11/7/22 at 11:32 AM EST

## 2022-11-08 ENCOUNTER — TELEPHONE (OUTPATIENT)
Dept: INTERNAL MEDICINE CLINIC | Age: 57
End: 2022-11-08

## 2022-11-08 DIAGNOSIS — R11.0 NAUSEA: Primary | ICD-10-CM

## 2022-11-08 NOTE — TELEPHONE ENCOUNTER
Called patient concerning results from CT of abdomen. Patient stated that she thinks that she has seen GI in past on [address]. Referral made for GI to eval her. Patient voiced understanding. Alerted  of referral.  No concerned voiced at this time.

## 2022-11-16 DIAGNOSIS — F41.9 ANXIETY AND DEPRESSION: ICD-10-CM

## 2022-11-16 DIAGNOSIS — F32.A ANXIETY AND DEPRESSION: ICD-10-CM

## 2022-11-16 RX ORDER — OLANZAPINE 5 MG/1
5 TABLET ORAL NIGHTLY
Qty: 30 TABLET | Refills: 0 | Status: SHIPPED | OUTPATIENT
Start: 2022-11-16

## 2022-12-02 DIAGNOSIS — F11.20 SEVERE OPIOID USE DISORDER (HCC): ICD-10-CM

## 2022-12-02 RX ORDER — BUPRENORPHINE AND NALOXONE 8; 2 MG/1; MG/1
1 FILM, SOLUBLE BUCCAL; SUBLINGUAL 2 TIMES DAILY
Qty: 28 FILM | Refills: 0 | Status: SHIPPED | OUTPATIENT
Start: 2022-12-02 | End: 2022-12-16

## 2022-12-02 NOTE — TELEPHONE ENCOUNTER
Last visit- 11/7/2022  Next visit- 12/16/2022    Requested Prescriptions     Pending Prescriptions Disp Refills    buprenorphine-naloxone (SUBOXONE) 8-2 MG FILM SL film 28 Film 0     Sig: Place 1 Film under the tongue 2 times daily for 14 days.

## 2022-12-19 DIAGNOSIS — F11.20 SEVERE OPIOID USE DISORDER (HCC): ICD-10-CM

## 2022-12-19 RX ORDER — BUPRENORPHINE AND NALOXONE 8; 2 MG/1; MG/1
1 FILM, SOLUBLE BUCCAL; SUBLINGUAL 2 TIMES DAILY
Qty: 6 FILM | Refills: 0 | Status: SHIPPED | OUTPATIENT
Start: 2022-12-19 | End: 2022-12-21 | Stop reason: SDUPTHER

## 2022-12-21 ENCOUNTER — OFFICE VISIT (OUTPATIENT)
Dept: INTERNAL MEDICINE CLINIC | Age: 57
End: 2022-12-21
Payer: COMMERCIAL

## 2022-12-21 VITALS
BODY MASS INDEX: 21.8 KG/M2 | SYSTOLIC BLOOD PRESSURE: 110 MMHG | WEIGHT: 131 LBS | HEART RATE: 49 BPM | RESPIRATION RATE: 16 BRPM | DIASTOLIC BLOOD PRESSURE: 74 MMHG

## 2022-12-21 DIAGNOSIS — F11.20 SEVERE OPIOID USE DISORDER (HCC): Primary | ICD-10-CM

## 2022-12-21 PROCEDURE — 80305 DRUG TEST PRSMV DIR OPT OBS: CPT | Performed by: NURSE PRACTITIONER

## 2022-12-21 PROCEDURE — G8484 FLU IMMUNIZE NO ADMIN: HCPCS | Performed by: NURSE PRACTITIONER

## 2022-12-21 PROCEDURE — 99214 OFFICE O/P EST MOD 30 MIN: CPT | Performed by: NURSE PRACTITIONER

## 2022-12-21 PROCEDURE — 4004F PT TOBACCO SCREEN RCVD TLK: CPT | Performed by: NURSE PRACTITIONER

## 2022-12-21 PROCEDURE — G8420 CALC BMI NORM PARAMETERS: HCPCS | Performed by: NURSE PRACTITIONER

## 2022-12-21 PROCEDURE — G8427 DOCREV CUR MEDS BY ELIG CLIN: HCPCS | Performed by: NURSE PRACTITIONER

## 2022-12-21 PROCEDURE — 3017F COLORECTAL CA SCREEN DOC REV: CPT | Performed by: NURSE PRACTITIONER

## 2022-12-21 RX ORDER — BUPRENORPHINE AND NALOXONE 8; 2 MG/1; MG/1
1 FILM, SOLUBLE BUCCAL; SUBLINGUAL 2 TIMES DAILY
Qty: 56 FILM | Refills: 0 | Status: SHIPPED | OUTPATIENT
Start: 2022-12-21 | End: 2023-01-17 | Stop reason: SDUPTHER

## 2022-12-21 RX ORDER — VARENICLINE TARTRATE 1 MG/1
1 TABLET, FILM COATED ORAL 2 TIMES DAILY
Qty: 60 TABLET | Refills: 0 | Status: SHIPPED | OUTPATIENT
Start: 2022-12-21

## 2022-12-21 NOTE — PROGRESS NOTES
[address] INTERNAL MEDICINE AND MEDICATION MANAGEMENT  [facility] [address]  Dept: [phone]  Dept Fax: [phone]: [phone]     Visit Date:  12/21/2022    Patient:  Coco Tadeo  YOB: 1965    HPI:     Chief Complaint   Patient presents with    Drug Problem     Pema Mckeon presents today for medical evaluation of severe opioid use disorder, anxiety/depression, weight loss, COPD, tobacco abuse, chronic pain     I last seen her 4 weeks ago. CT scheduled after last visit, as there was a lesion noted on the liver on previous CT of the chest. Cancelled EGD- liver US. She also cancelled her CT and pulmonary follow up. She was hospitalized 12/27-1/3 for symptomatic bradycardia, carbon monoxide poisoning, altered mental status, and severe malnutrition. Is following with cardiology. Had a 30 day event monitor, and she was started on metoprolol. No chest pain or palpitations. BP today in office 110/74, HR 49. Has not taken her BP meds yet today. Following with pulmonary as well. PFTs completed. Severe COPD. She is a current every day smoker, interested in smoking cessation. Did well on chantix previously, but started smoking again once she finished the starter pack (did not realize that she could continue it). Insurance refuses to cover, and it is too expensive for patient to afford. Pulmonary is working on it. Getting a CPAP. QUIT SMOKING 1 WEEK AGO. Weight is up today to 126 pounds today. No family history of GI cancers. No significant nausea/vomiting. No blood in her stool. No constipation/diarrhea. Hep C ordered, not yet obtained. Liver enzymes WNL. Colonoscopy 3/15- polyps removed, hemorrhoids noted, and scattered diverticula noted.       Urges and cravings controlled with Suboxone 8 mg BID     She denies any use     Urine positive for buprenorphine and THC      Not active in counseling     She had been prescribed opioids by her PCP for several years for chronic pain issues. She has chronic back pain reportedly. Is scheduled to follow up with pain management. Mobic is helpful. Had a basal cell carcinoma removed from left leg 8/15    Medications    Current Outpatient Medications:     buprenorphine-naloxone (SUBOXONE) 8-2 MG FILM SL film, Place 1 Film under the tongue 2 times daily for 28 days. Max Daily Amount: 2 Film, Disp: 56 Film, Rfl: 0    varenicline (CHANTIX) 1 MG tablet, Take 1 tablet by mouth 2 times daily, Disp: 60 tablet, Rfl: 0    OLANZapine (ZYPREXA) 5 MG tablet, Take 1 tablet by mouth nightly, Disp: 30 tablet, Rfl: 0    gabapentin (NEURONTIN) 300 MG capsule, take 1 capsule by mouth three times a day, Disp: 90 capsule, Rfl: 1    FLUoxetine (PROZAC) 40 MG capsule, take 1 capsule by mouth once daily, Disp: 30 capsule, Rfl: 1    triamterene-hydroCHLOROthiazide (MAXZIDE) 75-50 MG per tablet, take 1 tablet by mouth once daily, Disp: 30 tablet, Rfl: 1    BREZTRI AEROSPHERE 160-9-4.8 MCG/ACT AERO, Inhale 2 puffs into the lungs in the morning and at bedtime, Disp: 10.7 g, Rfl: 11    metoprolol tartrate (LOPRESSOR) 25 MG tablet, Take 1 tablet by mouth 2 times daily, Disp: 180 tablet, Rfl: 3    meloxicam (MOBIC) 15 MG tablet, Take one tablet by mouth once daily, Disp: 30 tablet, Rfl: 3    fluticasone (FLONASE) 50 MCG/ACT nasal spray, 1 spray by Nasal route daily [place] towards outside of nose, not towards the septum, Disp: 16 g, Rfl: 3    ondansetron (ZOFRAN) 4 MG tablet, Take 1 tab prn for nausea, Disp: 30 tablet, Rfl: 0    Multiple Vitamins-Minerals (THERAPEUTIC MULTIVITAMIN-MINERALS) tablet, Take 1 tablet by mouth daily, Disp: , Rfl:     albuterol sulfate HFA (VENTOLIN HFA) 108 (90 Base) MCG/ACT inhaler, Inhale 2 puffs into the lungs 4 times daily as needed for Wheezing, Disp: 54 g, Rfl: 1    varenicline (CHANTIX STARTING MONTH [place]) 0.5 MG X 11 & 1 MG X 42 tablet, Take by mouth.  (Patient not taking: Reported on 12/21/2022), Disp: 53 tablet, Rfl: 0    naloxone 4 MG/0.1ML LIQD nasal spray, 1 spray by Nasal route as needed for Opioid Reversal (Patient not taking: No sig reported), Disp: 1 each, Rfl: 1    The patient has No Known Allergies. Past Medical History  Sheila Rowe  has a past medical history of COPD (chronic obstructive pulmonary disease) ([address] and High blood pressure. Past Surgical History  The patient  has a past surgical history that includes Tubal ligation (04/01/2002); hernia repair (04/01/2002); laparoscopy (1994?); Wrist surgery (Right); cyst removal; Mohs surgery (Left, 08/15/2022); and Colonoscopy. Family History  This patient's family history includes Cancer in her father; Colon Polyps in her sister; Diabetes in her mother; Emphysema in her mother; Esophageal Cancer in her father; Heart Disease in her mother. Social History  Sheila Rowe  reports that she has been smoking cigarettes. She started smoking about 43 years ago. She has a 40.00 pack-year smoking history. She has never used smokeless tobacco. She reports that she does not currently use alcohol. She reports that she does not currently use drugs after having used the following drugs: Opiates .     Health Maintenance:    Health Maintenance   Topic Date Due    Pneumococcal 0-64 years Vaccine (1 - PCV) Never done    HIV screen  Never done    Hepatitis C screen  Never done    DTaP/Tdap/Td vaccine (1 - Tdap) Never done    Cervical cancer screen  Never done    Lipids  Never done    Colorectal Cancer Screen  Never done    Shingles vaccine (1 of 2) Never done    Breast cancer screen  11/21/2013    COVID-19 Vaccine (3 - Booster) 06/03/2021    Flu vaccine (1) Never done    Low dose CT lung screening  01/05/2023    A1C test (Diabetic or Prediabetic)  07/18/2023    GFR test (Diabetes, CKD 3-4, OR last GFR 15-59)  08/11/2023    Depression Monitoring  10/10/2023    Hepatitis A vaccine  Aged Out    Hib vaccine  Aged Out    Meningococcal (ACWY) vaccine  Aged Out       Subjective:      Review of Systems   Constitutional:  Positive for fatigue. Negative for chills, fever and unexpected weight change. HENT:  Negative for congestion, dental problem, ear pain, hearing loss, rhinorrhea, sinus pressure, sinus pain, sore throat, tinnitus and trouble swallowing. Eyes:  Negative for photophobia and visual disturbance. Respiratory:  Positive for shortness of breath (on exertion). Negative for cough and wheezing. Cardiovascular:  Negative for chest pain, palpitations and leg swelling. Gastrointestinal:  Negative for abdominal distention, abdominal pain, blood in stool, constipation, diarrhea, nausea and vomiting. Endocrine: Negative for polydipsia, polyphagia and polyuria. Genitourinary:  Negative for difficulty urinating, dysuria, frequency, hematuria and urgency. Musculoskeletal:  Positive for back pain (chronic). Negative for arthralgias and myalgias. Skin:  Negative for rash and wound. Neurological:  Negative for dizziness, light-headedness and headaches. Psychiatric/Behavioral:  Positive for dysphoric mood. Negative for sleep disturbance. The patient is not nervous/anxious. Objective:     /74 (Site: Right Lower Arm, Position: Sitting, Cuff Size: Medium Adult)   Pulse (!) 49   Resp 16   Wt 131 lb (59.4 kg)   BMI 21.80 kg/m²     Physical Exam  Vitals reviewed. Constitutional:       General: She is not in acute distress. Appearance: Normal appearance. She is not ill-appearing. HENT:      Head: Normocephalic and atraumatic. Right Ear: External ear normal.      Left Ear: External ear normal.      Nose: Nose normal. No congestion or rhinorrhea. Mouth/Throat:      Mouth: Mucous membranes are moist.   Eyes:      Extraocular Movements: Extraocular movements intact. Conjunctiva/sclera: Conjunctivae normal.      Pupils: Pupils are equal, round, and reactive to light.    Cardiovascular:      Rate and Rhythm: Normal rate and regular rhythm. Pulses: Normal pulses. Heart sounds: Normal heart sounds. No murmur heard. Pulmonary:      Effort: Pulmonary effort is normal. No respiratory distress. Breath sounds: Examination of the right-lower field reveals decreased breath sounds. Examination of the left-lower field reveals decreased breath sounds. Decreased breath sounds present. No rhonchi. Abdominal:      General: Abdomen is flat. Bowel sounds are normal. There is no distension. Palpations: Abdomen is soft. Musculoskeletal:         General: Normal range of motion. Cervical back: Normal range of motion and neck supple. Right lower leg: No edema. Left lower leg: No edema. Skin:     General: Skin is warm and dry. Findings: No erythema, lesion or rash. Neurological:      General: No focal deficit present. Mental Status: She is alert and oriented to person, place, and time. Psychiatric:         Mood and Affect: Mood normal.         Behavior: Behavior normal.         Thought Content: Thought content normal.         Judgment: Judgment normal.       Labs Reviewed 12/21/2022:    Lab Results   Component Value Date    WBC 12.9 (H) 08/11/2022    HGB 15.3 08/11/2022    HCT 47.7 (H) 08/11/2022     08/11/2022    ALT 15 07/25/2022    AST 19 07/25/2022     08/11/2022    K 4.4 08/11/2022    CL 96 (L) 08/11/2022    CREATININE 1.2 08/11/2022    BUN 36 (H) 08/11/2022    CO2 29 08/11/2022    TSH 4.350 (H) 07/25/2022    INR 0.99 12/31/2021    LABA1C 5.7 07/18/2022       Assessment/Plan      1. Severe opioid use disorder (HCC)    - POCT Rapid Drug Screen  - buprenorphine-naloxone (SUBOXONE) 8-2 MG FILM SL film; Place 1 Film under the tongue 2 times daily for 28 days.  Max Daily Amount: 2 Film  Dispense: 56 Film; Refill: 0  - Narcan at home  - Labs not yet obtained  - OARRS reviewed, no discrepancies  - Encouraged to attend NA/AA meetings and/or arrange for individualized counseling    Return in about 4 weeks (around 1/18/2023). Patient given educational materials - see patient instructions. Discussed use, benefit, and side effects of prescribed medications. All patient questions answered. Pt voiced understanding. Reviewed health maintenance.        Electronically signed ENMANUEL Do CNP on 12/21/22 at 3:36 PM EST

## 2023-01-05 ENCOUNTER — HOSPITAL ENCOUNTER (OUTPATIENT)
Dept: ULTRASOUND IMAGING | Age: 58
Discharge: HOME | End: 2023-01-05
Payer: COMMERCIAL

## 2023-01-05 ENCOUNTER — HOSPITAL ENCOUNTER (OUTPATIENT)
Dept: CT IMAGING | Age: 58
Discharge: HOME | End: 2023-01-05
Payer: COMMERCIAL

## 2023-01-05 DIAGNOSIS — R11.0 NAUSEA: ICD-10-CM

## 2023-01-05 DIAGNOSIS — R16.0 HEPATOMEGALY: ICD-10-CM

## 2023-01-05 DIAGNOSIS — J18.1 CONSOLIDATION OF LEFT LOWER LOBE OF LUNG (HCC): ICD-10-CM

## 2023-01-05 PROCEDURE — 76705 ECHO EXAM OF ABDOMEN: CPT

## 2023-01-05 PROCEDURE — 71250 CT THORAX DX C-: CPT

## 2023-01-16 ASSESSMENT — ENCOUNTER SYMPTOMS
WHEEZING: 0
CONSTIPATION: 0
ABDOMINAL DISTENTION: 0
BLOOD IN STOOL: 0
RHINORRHEA: 0
VOMITING: 0
BACK PAIN: 1
SORE THROAT: 0
SHORTNESS OF BREATH: 1
TROUBLE SWALLOWING: 0
DIARRHEA: 0
SINUS PRESSURE: 0
PHOTOPHOBIA: 0
SINUS PAIN: 0
NAUSEA: 0
ABDOMINAL PAIN: 0
COUGH: 0

## 2023-01-17 DIAGNOSIS — F11.20 SEVERE OPIOID USE DISORDER (HCC): ICD-10-CM

## 2023-01-17 RX ORDER — BUPRENORPHINE AND NALOXONE 8; 2 MG/1; MG/1
1 FILM, SOLUBLE BUCCAL; SUBLINGUAL 2 TIMES DAILY
Qty: 26 FILM | Refills: 0 | OUTPATIENT
Start: 2023-01-17 | End: 2023-01-30

## 2023-01-18 ENCOUNTER — TELEPHONE (OUTPATIENT)
Dept: INTERNAL MEDICINE CLINIC | Age: 58
End: 2023-01-18

## 2023-01-18 NOTE — TELEPHONE ENCOUNTER
VO per Emerald Found CNP for Suboxone 8mg film BID for 13 days qty 26. LPN called in script of Suboxone 8mg film BID for 13 days qty 26 into [facility] in [place], [place].

## 2023-01-30 ENCOUNTER — OFFICE VISIT (OUTPATIENT)
Dept: INTERNAL MEDICINE CLINIC | Age: 58
End: 2023-01-30
Payer: COMMERCIAL

## 2023-01-30 VITALS
RESPIRATION RATE: 16 BRPM | WEIGHT: 147 LBS | HEART RATE: 51 BPM | SYSTOLIC BLOOD PRESSURE: 131 MMHG | DIASTOLIC BLOOD PRESSURE: 85 MMHG | HEIGHT: 65 IN | BODY MASS INDEX: 24.49 KG/M2

## 2023-01-30 DIAGNOSIS — F11.20 SEVERE OPIOID USE DISORDER (HCC): Primary | ICD-10-CM

## 2023-01-30 PROCEDURE — 3017F COLORECTAL CA SCREEN DOC REV: CPT | Performed by: NURSE PRACTITIONER

## 2023-01-30 PROCEDURE — G8427 DOCREV CUR MEDS BY ELIG CLIN: HCPCS | Performed by: NURSE PRACTITIONER

## 2023-01-30 PROCEDURE — 80305 DRUG TEST PRSMV DIR OPT OBS: CPT | Performed by: NURSE PRACTITIONER

## 2023-01-30 PROCEDURE — 4004F PT TOBACCO SCREEN RCVD TLK: CPT | Performed by: NURSE PRACTITIONER

## 2023-01-30 PROCEDURE — G8484 FLU IMMUNIZE NO ADMIN: HCPCS | Performed by: NURSE PRACTITIONER

## 2023-01-30 PROCEDURE — 99214 OFFICE O/P EST MOD 30 MIN: CPT | Performed by: NURSE PRACTITIONER

## 2023-01-30 PROCEDURE — G8420 CALC BMI NORM PARAMETERS: HCPCS | Performed by: NURSE PRACTITIONER

## 2023-01-30 RX ORDER — BUPRENORPHINE AND NALOXONE 8; 2 MG/1; MG/1
1 FILM, SOLUBLE BUCCAL; SUBLINGUAL 2 TIMES DAILY
Qty: 26 FILM | Refills: 0 | Status: CANCELLED | OUTPATIENT
Start: 2023-01-30 | End: 2023-02-12

## 2023-01-30 RX ORDER — BUPRENORPHINE HYDROCHLORIDE AND NALOXONE HYDROCHLORIDE DIHYDRATE 8; 2 MG/1; MG/1
1 TABLET SUBLINGUAL 2 TIMES DAILY
Qty: 56 TABLET | Refills: 0 | Status: SHIPPED | OUTPATIENT
Start: 2023-01-30 | End: 2023-02-27

## 2023-01-30 NOTE — PROGRESS NOTES
[address] [facility]ICATION [facility]NAGEMENT  [place] [address]  Dept: [phone]  Dept Fax: [phone]: [phone]     Visit Date:  1/30/2023    Patient:  Parag Logan  YOB: 1965    HPI:     Chief Complaint   Patient presents with    Drug Problem     Isaiah presents today for medical evaluation of severe opioid use disorder, anxiety/depression, weight loss, COPD, tobacco abuse, chronic pain, liver lesion     I last seen her 4 weeks ago. CT scheduled after last visit, as there was a lesion noted on the liver on previous CT of the chest. Cancelled EGD. She also cancelled her CT and pulmonary follow up. Did have her ultrasound of the liver completed. CT Chest Without Contrast 1/5/23  Impression       The area of linear and irregular consolidation in the left lower lobe posteriorly has partially resolved. There continues to be patchy reticular nodular groundglass opacity in the left lower lobe posteriorly, possibly infectious/inflammatory. A 3 month    follow-up noncontrast CT thorax is advised to ensure continued improvement/resolution. Liver US 1/5/23  Impression   1. Cholelithiasis without evidence of acute cholecystitis. 2. Hyperechoic lesions in the right and left hepatic lobes, possibly hemangiomas and better characterized on prior CT of the abdomen and pelvis. She was hospitalized 12/27-1/3 for symptomatic bradycardia, carbon monoxide poisoning, altered mental status, and severe malnutrition. Is following with cardiology. Had a 30 day event monitor, and she was started on metoprolol. No chest pain or palpitations. BP today in office 131/85 and HR 51. Has not taken her BP meds yet today. Following with pulmonary as well. PFTs completed. Severe COPD. She is a current every day smoker, interested in smoking cessation.  Did well on chantix previously, but started smoking again once she finished the starter pack (did not realize that she could continue it). Insurance refuses to cover, and it is too expensive for patient to afford. Pulmonary is working on it. Getting a CPAP. She quit smoking > 8 weeks ago. Weight is up today to 147 pounds today. No family history of GI cancers. No significant nausea/vomiting. No blood in her stool. No constipation/diarrhea. Hep C ordered, not yet obtained. Liver enzymes WNL. Colonoscopy 3/15- polyps removed, hemorrhoids noted, and scattered diverticula noted. Urges and cravings controlled with Suboxone 8 mg BID     She denies any use     Urine positive for buprenorphine and THC      Not active in counseling     She had been prescribed opioids by her PCP for several years for chronic pain issues. She has chronic back pain reportedly. Is scheduled to follow up with pain management. Mobic is helpful. Had a basal cell carcinoma removed from left leg 8/15    Medications    Current Outpatient Medications:     buprenorphine-naloxone (SUBOXONE) 8-2 MG SUBL SL tablet, Place 1 tablet under the tongue in the morning and at bedtime for 28 days.  Max Daily Amount: 2 tablets, Disp: 56 tablet, Rfl: 0    varenicline (CHANTIX) 1 MG tablet, Take 1 tablet by mouth 2 times daily, Disp: 60 tablet, Rfl: 0    OLANZapine (ZYPREXA) 5 MG tablet, Take 1 tablet by mouth nightly, Disp: 30 tablet, Rfl: 0    gabapentin (NEURONTIN) 300 MG capsule, take 1 capsule by mouth three times a day, Disp: 90 capsule, Rfl: 1    FLUoxetine (PROZAC) 40 MG capsule, take 1 capsule by mouth once daily, Disp: 30 capsule, Rfl: 1    triamterene-hydroCHLOROthiazide (MAXZIDE) 75-50 MG per tablet, take 1 tablet by mouth once daily, Disp: 30 tablet, Rfl: 1    BREZTRI AEROSPHERE 160-9-4.8 MCG/ACT AERO, Inhale 2 puffs into the lungs in the morning and at bedtime, Disp: 10.7 g, Rfl: 11    metoprolol tartrate (LOPRESSOR) 25 MG tablet, Take 1 tablet by mouth 2 times daily, Disp: 180 tablet, Rfl: 3    meloxicam (MOBIC) 15 MG tablet, Take one tablet by mouth once daily, Disp: 30 tablet, Rfl: 3    fluticasone (FLONASE) 50 MCG/ACT nasal spray, 1 spray by Nasal route daily [place] towards outside of nose, not towards the septum, Disp: 16 g, Rfl: 3    ondansetron (ZOFRAN) 4 MG tablet, Take 1 tab prn for nausea, Disp: 30 tablet, Rfl: 0    Multiple Vitamins-Minerals (THERAPEUTIC MULTIVITAMIN-MINERALS) tablet, Take 1 tablet by mouth daily, Disp: , Rfl:     albuterol sulfate HFA (VENTOLIN HFA) 108 (90 Base) MCG/ACT inhaler, Inhale 2 puffs into the lungs 4 times daily as needed for Wheezing, Disp: 54 g, Rfl: 1    varenicline (CHANTIX STARTING MONTH PAK) 0.5 MG X 11 & 1 MG X 42 tablet, Take by mouth. (Patient not taking: No sig reported), Disp: 53 tablet, Rfl: 0    naloxone 4 MG/0.1ML LIQD nasal spray, 1 spray by Nasal route as needed for Opioid Reversal (Patient not taking: No sig reported), Disp: 1 each, Rfl: 1    The patient has No Known Allergies. Past Medical History  Yarelis Reeder  has a past medical history of COPD (chronic obstructive pulmonary disease) ([facility] [address] and High blood pressure. Past Surgical History  The patient  has a past surgical history that includes Tubal ligation (04/01/2002); hernia repair (04/01/2002); laparoscopy (1994?); Wrist surgery (Right); cyst removal; Mohs surgery (Left, 08/15/2022); and Colonoscopy. Family History  This patient's family history includes Cancer in her father; Colon Polyps in her sister; Diabetes in her mother; Emphysema in her mother; Esophageal Cancer in her father; Heart Disease in her mother. Social History  Yarelis Reeder  reports that she has been smoking cigarettes. She started smoking about 43 years ago. She has a 40.00 pack-year smoking history. She has never used smokeless tobacco. She reports that she does not currently use alcohol. She reports that she does not currently use drugs after having used the following drugs: Opiates .     Health Maintenance: Health Maintenance   Topic Date Due    Lipids  Never done    DTaP/Tdap/Td vaccine (1 - Tdap) 02/20/2023 (Originally 2/2/1982)    COVID-19 Vaccine (3 - Booster) 09/06/2023 (Originally 6/3/2021)    Flu vaccine (1) 01/30/2024 (Originally 8/1/2022)    Shingles vaccine (1 of 2) 01/30/2024 (Originally 2/2/2013)    Cervical cancer screen  01/30/2024 (Originally 2/2/1984)    Pneumococcal 0-64 years Vaccine (1 - PCV) 09/08/2024 (Originally 2/2/1969)    A1C test (Diabetic or Prediabetic)  07/18/2023    GFR test (Diabetes, CKD 3-4, OR last GFR 15-59)  08/11/2023    Breast cancer screen  09/08/2023    Depression Monitoring  10/10/2023    Colorectal Cancer Screen  09/08/2024    Hepatitis A vaccine  Aged Out    Hib vaccine  Aged Out    Meningococcal (ACWY) vaccine  Aged Out    Hepatitis C screen  Discontinued    HIV screen  Discontinued    Low dose CT lung screening  Discontinued       Subjective:      Review of Systems   Constitutional:  Positive for fatigue. Negative for chills, fever and unexpected weight change. HENT:  Negative for congestion, dental problem, ear pain, hearing loss, rhinorrhea, sinus pressure, sinus pain, sore throat, tinnitus and trouble swallowing. Eyes:  Negative for photophobia and visual disturbance. Respiratory:  Positive for shortness of breath (on exertion). Negative for cough and wheezing. Cardiovascular:  Negative for chest pain, palpitations and leg swelling. Gastrointestinal:  Negative for abdominal distention, abdominal pain, blood in stool, constipation, diarrhea, nausea and vomiting. Endocrine: Negative for polydipsia, polyphagia and polyuria. Genitourinary:  Negative for difficulty urinating, dysuria, frequency, hematuria and urgency. Musculoskeletal:  Positive for back pain (chronic). Negative for arthralgias and myalgias. Skin:  Negative for rash and wound. Neurological:  Negative for dizziness, light-headedness and headaches.    Psychiatric/Behavioral:  Positive for dysphoric mood. Negative for sleep disturbance. The patient is not nervous/anxious. Objective:     /85 (Site: Right Lower Arm, Position: Sitting, Cuff Size: Medium Adult)   Pulse 51   Resp 16   Ht 5' 5\" (1.651 m)   Wt 147 lb (66.7 kg)   BMI 24.46 kg/m²     Physical Exam  Vitals reviewed. Constitutional:       General: She is not in acute distress. Appearance: Normal appearance. She is not ill-appearing. HENT:      Head: Normocephalic and atraumatic. Right Ear: External ear normal.      Left Ear: External ear normal.      Nose: Nose normal. No congestion or rhinorrhea. Mouth/Throat:      Mouth: Mucous membranes are moist.   Eyes:      Extraocular Movements: Extraocular movements intact. Conjunctiva/sclera: Conjunctivae normal.      Pupils: Pupils are equal, round, and reactive to light. Cardiovascular:      Rate and Rhythm: Normal rate and regular rhythm. Pulses: Normal pulses. Heart sounds: Normal heart sounds. No murmur heard. Pulmonary:      Effort: Pulmonary effort is normal. No respiratory distress. Breath sounds: Examination of the right-lower field reveals decreased breath sounds. Examination of the left-lower field reveals decreased breath sounds. Decreased breath sounds present. No rhonchi. Abdominal:      General: Abdomen is flat. Bowel sounds are normal. There is no distension. Palpations: Abdomen is soft. Musculoskeletal:         General: Normal range of motion. Cervical back: Normal range of motion and neck supple. Right lower leg: No edema. Left lower leg: No edema. Skin:     General: Skin is warm and dry. Findings: No erythema, lesion or rash. Neurological:      General: No focal deficit present. Mental Status: She is alert and oriented to person, place, and time. Psychiatric:         Mood and Affect: Mood normal.         Behavior: Behavior normal.         Thought Content:  Thought content normal. Judgment: Judgment normal.       Labs Reviewed 1/30/2023:    Lab Results   Component Value Date    WBC 12.9 (H) 08/11/2022    HGB 15.3 08/11/2022    HCT 47.7 (H) 08/11/2022     08/11/2022    ALT 15 07/25/2022    AST 19 07/25/2022     08/11/2022    K 4.4 08/11/2022    CL 96 (L) 08/11/2022    CREATININE 1.2 08/11/2022    BUN 36 (H) 08/11/2022    CO2 29 08/11/2022    TSH 4.350 (H) 07/25/2022    INR 0.99 12/31/2021    LABA1C 5.7 07/18/2022       Assessment/Plan      1. Severe opioid use disorder (HCC)    - POCT Rapid Drug Screen  - buprenorphine-naloxone (SUBOXONE) 8-2 MG SUBL SL tablet; Place 1 tablet under the tongue in the morning and at bedtime for 28 days. Max Daily Amount: 2 tablets  Dispense: 56 tablet; Refill: 0  - Narcan at home  - Labs not yet obtained  - OARRS reviewed, no discrepancies  - Encouraged to attend NA/AA meetings and/or arrange for individualized counseling    Return in about 4 weeks (around 2/27/2023). Patient given educational materials - see patient instructions. Discussed use, benefit, and side effects of prescribed medications. All patient questions answered. Pt voiced understanding. Reviewed health maintenance.        Electronically signed ENMANUEL Gunderson CNP on 1/30/23 at 2:47 PM EST

## 2023-01-31 ASSESSMENT — ENCOUNTER SYMPTOMS
CONSTIPATION: 0
VOMITING: 0
SHORTNESS OF BREATH: 1
ABDOMINAL DISTENTION: 0
SINUS PAIN: 0
BACK PAIN: 1
RHINORRHEA: 0
COUGH: 0
NAUSEA: 0
ABDOMINAL PAIN: 0
TROUBLE SWALLOWING: 0
WHEEZING: 0
DIARRHEA: 0
SINUS PRESSURE: 0
PHOTOPHOBIA: 0
BLOOD IN STOOL: 0
SORE THROAT: 0

## 2023-02-08 DIAGNOSIS — I10 HYPERTENSION, UNSPECIFIED TYPE: ICD-10-CM

## 2023-02-09 RX ORDER — TRIAMTERENE AND HYDROCHLOROTHIAZIDE 75; 50 MG/1; MG/1
TABLET ORAL
Qty: 30 TABLET | Refills: 1 | Status: SHIPPED | OUTPATIENT
Start: 2023-02-09

## 2023-02-14 NOTE — TELEPHONE ENCOUNTER
Called pt per KD to reschedule appt on 06/09/22 due to KD being out of office. Pt did not answer LVM. Pt can return call @ [phone] to reschedule appt. Health Maintenance Due   Topic Date Due   • Shingles Vaccine (1 of 2) Never done   • Influenza Vaccine (1) Never done   • Medicare Advantage- Medicare Wellness Visit  01/01/2023       Patient is due for topics as listed above but is not proceeding with Immunization(s) Influenza and Shingles and MWV (Medicare Wellness Visit) at this time.      Recent Review Flowsheet Data     Date 2/14/2023    Adult PHQ 2 Score 0    Adult PHQ 2 Interpretation No further screening needed    Little interest or pleasure in activity? Not at all    Feeling down, depressed or hopeless? Not at all

## 2023-02-19 DIAGNOSIS — F32.A ANXIETY AND DEPRESSION: ICD-10-CM

## 2023-02-19 DIAGNOSIS — F41.9 ANXIETY AND DEPRESSION: ICD-10-CM

## 2023-02-20 RX ORDER — OLANZAPINE 5 MG/1
TABLET ORAL
Qty: 30 TABLET | Refills: 0 | Status: SHIPPED | OUTPATIENT
Start: 2023-02-20

## 2023-02-26 DIAGNOSIS — G89.4 CHRONIC PAIN SYNDROME: ICD-10-CM

## 2023-02-27 ENCOUNTER — OFFICE VISIT (OUTPATIENT)
Dept: INTERNAL MEDICINE CLINIC | Age: 58
End: 2023-02-27
Payer: COMMERCIAL

## 2023-02-27 VITALS
BODY MASS INDEX: 24.66 KG/M2 | WEIGHT: 148 LBS | DIASTOLIC BLOOD PRESSURE: 63 MMHG | HEIGHT: 65 IN | HEART RATE: 57 BPM | SYSTOLIC BLOOD PRESSURE: 104 MMHG

## 2023-02-27 DIAGNOSIS — F11.20 SEVERE OPIOID USE DISORDER (HCC): Primary | ICD-10-CM

## 2023-02-27 PROCEDURE — G8484 FLU IMMUNIZE NO ADMIN: HCPCS | Performed by: NURSE PRACTITIONER

## 2023-02-27 PROCEDURE — 99214 OFFICE O/P EST MOD 30 MIN: CPT | Performed by: NURSE PRACTITIONER

## 2023-02-27 PROCEDURE — 80305 DRUG TEST PRSMV DIR OPT OBS: CPT | Performed by: NURSE PRACTITIONER

## 2023-02-27 PROCEDURE — G8428 CUR MEDS NOT DOCUMENT: HCPCS | Performed by: NURSE PRACTITIONER

## 2023-02-27 PROCEDURE — G8420 CALC BMI NORM PARAMETERS: HCPCS | Performed by: NURSE PRACTITIONER

## 2023-02-27 PROCEDURE — 3017F COLORECTAL CA SCREEN DOC REV: CPT | Performed by: NURSE PRACTITIONER

## 2023-02-27 PROCEDURE — 1036F TOBACCO NON-USER: CPT | Performed by: NURSE PRACTITIONER

## 2023-02-27 RX ORDER — GABAPENTIN 300 MG/1
CAPSULE ORAL
Qty: 90 CAPSULE | Refills: 1 | Status: SHIPPED | OUTPATIENT
Start: 2023-02-27 | End: 2023-03-29

## 2023-02-27 RX ORDER — BUPRENORPHINE HYDROCHLORIDE AND NALOXONE HYDROCHLORIDE DIHYDRATE 8; 2 MG/1; MG/1
1 TABLET SUBLINGUAL 2 TIMES DAILY
Qty: 56 TABLET | Refills: 0 | Status: SHIPPED | OUTPATIENT
Start: 2023-02-27 | End: 2023-03-27

## 2023-02-27 RX ORDER — MELOXICAM 15 MG/1
TABLET ORAL
Qty: 30 TABLET | Refills: 3 | Status: SHIPPED | OUTPATIENT
Start: 2023-02-27

## 2023-02-27 NOTE — PROGRESS NOTES
[address] INTERNAL MEDICINE AND MEDICATION MANAGEMENT  [facility] [place] [address]  Dept: [phone]  Dept Fax: [phone]: [phone]     Visit Date:  2/27/2023    Patient:  Daniel Bush  YOB: 1965    HPI:     Chief Complaint   Patient presents with    Drug Problem     [facility] presents today for medical evaluation of severe opioid use disorder, anxiety/depression, weight loss, COPD, tobacco abuse, chronic pain, liver lesion     I last seen her 4 weeks ago. CT scheduled after last visit, as there was a lesion noted on the liver on previous CT of the chest. Cancelled EGD. She also cancelled her CT and pulmonary follow up. Did have her ultrasound of the liver completed. CT Chest Without Contrast 1/5/23  Impression       The area of linear and irregular consolidation in the left lower lobe posteriorly has partially resolved. There continues to be patchy reticular nodular groundglass opacity in the left lower lobe posteriorly, possibly infectious/inflammatory. A 3 month    follow-up noncontrast CT thorax is advised to ensure continued improvement/resolution. Liver US 1/5/23  Impression   1. Cholelithiasis without evidence of acute cholecystitis. 2. Hyperechoic lesions in the right and left hepatic lobes, possibly hemangiomas and better characterized on prior CT of the abdomen and pelvis. She was hospitalized 12/27-1/3 for symptomatic bradycardia, carbon monoxide poisoning, altered mental status, and severe malnutrition. Is following with cardiology. Had a 30 day event monitor, and she was started on metoprolol. No chest pain or palpitations. BP today in office 104/63 and HR 57. Following with pulmonary as well. PFTs completed. Severe COPD. She is a current every day smoker, interested in smoking cessation.  Did well on chantix previously, but started smoking again once she finished the starter pack (did not realize that she could continue it). Insurance refuses to cover, and it is too expensive for patient to afford. Pulmonary is working on it. Getting a CPAP. She quit smoking > 8 weeks ago. Weight is up today to 148 pounds today. No family history of GI cancers. No significant nausea/vomiting. No blood in her stool. No constipation/diarrhea. Hep C ordered, not yet obtained. Liver enzymes WNL. Colonoscopy 3/15- polyps removed, hemorrhoids noted, and scattered diverticula noted. Urges and cravings controlled with Suboxone 8 mg BID     She denies any use     Urine positive for buprenorphine only     Not active in counseling     She had been prescribed opioids by her PCP for several years for chronic pain issues. She has chronic back pain reportedly. Is scheduled to follow up with pain management. Mobic is helpful. Had a basal cell carcinoma removed from left leg 8/15    Medications    Current Outpatient Medications:     buprenorphine-naloxone (SUBOXONE) 8-2 MG SUBL SL tablet, Place 1 tablet under the tongue in the morning and at bedtime for 28 days.  Max Daily Amount: 2 tablets, Disp: 56 tablet, Rfl: 0    gabapentin (NEURONTIN) 300 MG capsule, take 1 capsule by mouth three times a day, Disp: 90 capsule, Rfl: 1    meloxicam (MOBIC) 15 MG tablet, take 1 tablet by mouth once daily, Disp: 30 tablet, Rfl: 3    OLANZapine (ZYPREXA) 5 MG tablet, take 1 tablet by mouth at bedtime, Disp: 30 tablet, Rfl: 0    FLUoxetine (PROZAC) 40 MG capsule, take 1 capsule by mouth once daily, Disp: 30 capsule, Rfl: 1    triamterene-hydroCHLOROthiazide (MAXZIDE) 75-50 MG per tablet, take 1 tablet by mouth once daily, Disp: 30 tablet, Rfl: 1    BREZTRI AEROSPHERE 160-9-4.8 MCG/ACT AERO, Inhale 2 puffs into the lungs in the morning and at bedtime, Disp: 10.7 g, Rfl: 11    metoprolol tartrate (LOPRESSOR) 25 MG tablet, Take 1 tablet by mouth 2 times daily, Disp: 180 tablet, Rfl: 3    fluticasone (FLONASE) 50 MCG/ACT nasal spray, 1 spray by Nasal route daily [place] towards outside of nose, not towards the septum, Disp: 16 g, Rfl: 3    ondansetron (ZOFRAN) 4 MG tablet, Take 1 tab prn for nausea, Disp: 30 tablet, Rfl: 0    Multiple Vitamins-Minerals (THERAPEUTIC MULTIVITAMIN-MINERALS) tablet, Take 1 tablet by mouth daily, Disp: , Rfl:     albuterol sulfate HFA (VENTOLIN HFA) 108 (90 Base) MCG/ACT inhaler, Inhale 2 puffs into the lungs 4 times daily as needed for Wheezing, Disp: 54 g, Rfl: 1    naloxone 4 MG/0.1ML LIQD nasal spray, 1 spray by Nasal route as needed for Opioid Reversal, Disp: 1 each, Rfl: 1    The patient has No Known Allergies. Past Medical History  Zunilda Catherine  has a past medical history of COPD (chronic obstructive pulmonary disease) ([facility] [address] and High blood pressure. Past Surgical History  The patient  has a past surgical history that includes Tubal ligation (04/01/2002); hernia repair (04/01/2002); laparoscopy (1994?); Wrist surgery (Right); cyst removal; Mohs surgery (Left, 08/15/2022); and Colonoscopy. Family History  This patient's family history includes Cancer in her father; Colon Polyps in her sister; Diabetes in her mother; Emphysema in her mother; Esophageal Cancer in her father; Heart Disease in her mother. Social History  Zunilda Catherine  reports that she quit smoking about 4 months ago. Her smoking use included cigarettes. She started smoking about 43 years ago. She has a 40.00 pack-year smoking history. She has never used smokeless tobacco. She reports that she does not currently use alcohol. She reports that she does not currently use drugs after having used the following drugs: Opiates .     Health Maintenance:    Health Maintenance   Topic Date Due    DTaP/Tdap/Td vaccine (1 - Tdap) Never done    Lipids  Never done    COVID-19 Vaccine (3 - Booster) 09/06/2023 (Originally 6/3/2021)    Flu vaccine (1) 01/30/2024 (Originally 8/1/2022)    Shingles vaccine (1 of 2) 01/30/2024 (Originally 2/2/2013) Cervical cancer screen  01/30/2024 (Originally 2/2/1984)    Pneumococcal 0-64 years Vaccine (1 - PCV) 09/08/2024 (Originally 2/2/1969)    A1C test (Diabetic or Prediabetic)  07/18/2023    GFR test (Diabetes, CKD 3-4, OR last GFR 15-59)  08/11/2023    Breast cancer screen  09/08/2023    Depression Monitoring  10/10/2023    Colorectal Cancer Screen  09/08/2024    Hepatitis A vaccine  Aged Out    Hib vaccine  Aged Out    Meningococcal (ACWY) vaccine  Aged Out    Hepatitis C screen  Discontinued    HIV screen  Discontinued    Low dose CT lung screening  Discontinued       Subjective:      Review of Systems   Constitutional:  Positive for fatigue. Negative for chills, fever and unexpected weight change. HENT:  Negative for congestion, dental problem, ear pain, hearing loss, rhinorrhea, sinus pressure, sinus pain, sore throat, tinnitus and trouble swallowing. Eyes:  Negative for photophobia and visual disturbance. Respiratory:  Positive for shortness of breath (on exertion). Negative for cough and wheezing. Cardiovascular:  Negative for chest pain, palpitations and leg swelling. Gastrointestinal:  Negative for abdominal distention, abdominal pain, blood in stool, constipation, diarrhea, nausea and vomiting. Endocrine: Negative for polydipsia, polyphagia and polyuria. Genitourinary:  Negative for difficulty urinating, dysuria, frequency, hematuria and urgency. Musculoskeletal:  Positive for back pain (chronic). Negative for arthralgias and myalgias. Skin:  Negative for rash and wound. Neurological:  Negative for dizziness, light-headedness and headaches. Psychiatric/Behavioral:  Positive for dysphoric mood. Negative for sleep disturbance. The patient is not nervous/anxious. Objective:     /63 (Site: Right Lower Arm, Position: Sitting, Cuff Size: Medium Adult)   Pulse 57   Ht 5' 5\" (1.651 m)   Wt 148 lb (67.1 kg)   BMI 24.63 kg/m²     Physical Exam  Vitals reviewed.    Constitutional: General: She is not in acute distress. Appearance: Normal appearance. She is not ill-appearing. HENT:      Head: Normocephalic and atraumatic. Right Ear: External ear normal.      Left Ear: External ear normal.      Nose: Nose normal. No congestion or rhinorrhea. Mouth/Throat:      Mouth: Mucous membranes are moist.   Eyes:      Extraocular Movements: Extraocular movements intact. Conjunctiva/sclera: Conjunctivae normal.      Pupils: Pupils are equal, round, and reactive to light. Cardiovascular:      Rate and Rhythm: Normal rate and regular rhythm. Pulses: Normal pulses. Heart sounds: Normal heart sounds. No murmur heard. Pulmonary:      Effort: Pulmonary effort is normal. No respiratory distress. Breath sounds: Examination of the right-lower field reveals decreased breath sounds. Examination of the left-lower field reveals decreased breath sounds. Decreased breath sounds present. No rhonchi. Abdominal:      General: Abdomen is flat. Bowel sounds are normal. There is no distension. Palpations: Abdomen is soft. Musculoskeletal:         General: Normal range of motion. Cervical back: Normal range of motion and neck supple. Right lower leg: No edema. Left lower leg: No edema. Skin:     General: Skin is warm and dry. Findings: No erythema, lesion or rash. Neurological:      General: No focal deficit present. Mental Status: She is alert and oriented to person, place, and time. Psychiatric:         Mood and Affect: Mood normal.         Behavior: Behavior normal.         Thought Content:  Thought content normal.         Judgment: Judgment normal.       Labs Reviewed 2/27/2023:    Lab Results   Component Value Date    WBC 12.9 (H) 08/11/2022    HGB 15.3 08/11/2022    HCT 47.7 (H) 08/11/2022     08/11/2022    ALT 15 07/25/2022    AST 19 07/25/2022     08/11/2022    K 4.4 08/11/2022    CL 96 (L) 08/11/2022    CREATININE 1.2 08/11/2022    BUN 36 (H) 08/11/2022    CO2 29 08/11/2022    TSH 4.350 (H) 07/25/2022    INR 0.99 12/31/2021    LABA1C 5.7 07/18/2022       Assessment/Plan      1. Severe opioid use disorder (HCC)    - POCT Rapid Drug Screen  - buprenorphine-naloxone (SUBOXONE) 8-2 MG SUBL SL tablet; Place 1 tablet under the tongue in the morning and at bedtime for 28 days. Max Daily Amount: 2 tablets  Dispense: 56 tablet; Refill: 0  - Narcan at home  - Labs not yet obtained  - OARRS reviewed, no discrepancies  - Encouraged to attend NA/AA meetings and/or arrange for individualized counseling      Return in about 4 weeks (around 3/27/2023). Patient given educational materials - see patient instructions. Discussed use, benefit, and side effects of prescribed medications. All patient questions answered. Pt voiced understanding. Reviewed health maintenance.        Electronically signed ENMANUEL Miranda - CNP on 2/27/23 at 2:39 PM EST

## 2023-02-27 NOTE — PROGRESS NOTES
Sw completed a renewed prescription for patient's Reset O Edward. Waiting for Norah Daughters to sign off.

## 2023-03-02 ENCOUNTER — OFFICE VISIT (OUTPATIENT)
Dept: PULMONOLOGY | Age: 58
End: 2023-03-02
Payer: COMMERCIAL

## 2023-03-02 VITALS
HEART RATE: 51 BPM | OXYGEN SATURATION: 95 % | HEIGHT: 65 IN | SYSTOLIC BLOOD PRESSURE: 110 MMHG | BODY MASS INDEX: 25.66 KG/M2 | DIASTOLIC BLOOD PRESSURE: 80 MMHG | TEMPERATURE: 98.4 F | WEIGHT: 154 LBS

## 2023-03-02 DIAGNOSIS — J18.1 CONSOLIDATION OF LEFT LOWER LOBE OF LUNG (HCC): ICD-10-CM

## 2023-03-02 DIAGNOSIS — J96.11 CHRONIC RESPIRATORY FAILURE WITH HYPOXIA (HCC): ICD-10-CM

## 2023-03-02 DIAGNOSIS — J44.9 STAGE 3 SEVERE COPD BY GOLD CLASSIFICATION (HCC): Primary | ICD-10-CM

## 2023-03-02 DIAGNOSIS — J98.11 ATELECTASIS: ICD-10-CM

## 2023-03-02 DIAGNOSIS — R91.8 GROUND GLASS OPACITY PRESENT ON IMAGING OF LUNG: ICD-10-CM

## 2023-03-02 PROCEDURE — 3023F SPIROM DOC REV: CPT

## 2023-03-02 PROCEDURE — 94618 PULMONARY STRESS TESTING: CPT

## 2023-03-02 PROCEDURE — G8484 FLU IMMUNIZE NO ADMIN: HCPCS

## 2023-03-02 PROCEDURE — G8427 DOCREV CUR MEDS BY ELIG CLIN: HCPCS

## 2023-03-02 PROCEDURE — 3017F COLORECTAL CA SCREEN DOC REV: CPT

## 2023-03-02 PROCEDURE — 99214 OFFICE O/P EST MOD 30 MIN: CPT

## 2023-03-02 PROCEDURE — G8419 CALC BMI OUT NRM PARAM NOF/U: HCPCS

## 2023-03-02 PROCEDURE — 1036F TOBACCO NON-USER: CPT

## 2023-03-02 ASSESSMENT — ENCOUNTER SYMPTOMS
SINUS PRESSURE: 0
SINUS PAIN: 0
SHORTNESS OF BREATH: 1
RHINORRHEA: 0
COUGH: 0
WHEEZING: 0
CHEST TIGHTNESS: 0

## 2023-03-02 NOTE — PATIENT INSTRUCTIONS
Continue Breztri. Rinse mouth with water, gargle, and spit after use. Continue your albuterol inhaler and nebulizer. You may use one or the other every 6 hours as needed for shortness of breath or wheezing. Do NOT use both at the same time as they continue the same medication. Make sure to use your incentive spirometer 4 times daily    Based on your walk test today, you need 1 lpm of oxygen with walking    I will see you back in 1 month with your CT scan      Learning About Using an Incentive Spirometer  What is an incentive spirometer? An incentive spirometer is a handheld device that exercises your lungs and measures how much air you can breathe in. It tells you and your doctor how well your lungs are working. The spirometer can help you practice taking deep breaths. Deep breaths can help open your airways and prevent fluid or mucus from building up in your lungs, and make it easier for you to breathe. Using the device can help prevent serious lung infections like pneumonia, improve your breathing after you've had pneumonia or surgery, and keep your airways open and lungs active if you can't get out of bed. How do you use an incentive spirometer? When you use an incentive spirometer, you breathe in air through a tube that is connected to a large air column containing a piston or ball. As you breathe in, the piston or ball inside the column moves up. The height of the piston or ball shows how much air you breathed in. You may feel lightheaded when you breathe in deeply for this exercise. If you feel dizzy or feel like you're going to pass out, stop the exercise and rest.  Each time you do this exercise, keep track of your progress by writing down how high the piston or ball moves up the column. Move the slider on the outside of the large column to the level that you want to reach or that your doctor recommended. Sit or stand up straight, and hold the spirometer in front of you.    Be sure to keep it level. To start, breathe out normally. Then close your lips tightly around the mouthpiece. Make sure that you don't block the mouthpiece with your tongue. Take a slow, deep breath. Breathe in as deeply as you can. As you breathe in, the piston or ball inside the large column will move up. Try to move the piston or ball as high up as you can or to the level your doctor recommended. When you can't breathe in anymore, hold your breath for 2 to 5 seconds. Relax, take the mouthpiece out of your mouth, and breathe out normally. Repeat steps 1 through 5 as many times as your doctor tells you to. After you've taken the recommended number of breaths, try to cough a few times. This will help loosen any mucus that has built up in your lungs. It will make it easier for you to breathe. If you just had surgery on your belly or chest, hold a pillow over your cut (incision) when you cough. Follow-up care is a key part of your treatment and safety. Be sure to make and go to all appointments, and call your doctor if you are having problems. It's also a good idea to know your test results and keep a list of the medicines you take. Where can you learn more? Go to [email or web address].[email or web address]. org and sign in to your [employer] account. Enter Q121 in the [facility] box to learn more about \"Learning About Using an Incentive Spirometer. \"     If you do not have an account, please click on the \"Sign Up Now\" link. Current as of: July 6, 2021               Content Version: 13.1  © 6525-2484 Healthwise, Incorporated. Care instructions adapted under license by [facility] ([facility]).  If you have questions about a medical condition or this instruction, always ask your healthcare professional.

## 2023-03-02 NOTE — PROGRESS NOTES
[facility]    Patient: Susanne Bernabe, 59 y.o.   : 1965  3/2/2023    Patient of Dr. Zoë Wells   Patient presents with    Follow-up     4mo COPD and Consolidation of LLL of lung w/CT Chest w/o contrast completed 23 at [facility]. Doing well. ARPIT Pittman is here for follow up for COPD and CT follow up. Patient was treated with doxycycline and prednisone at her last appointment. Her repeat CT Chest reveals partially resolved linear and irregular consolidation in the LLL posteriorly and continues to be patchy reticular nodular GG opacity in the LLL posteriorly. A CT Chest in 3 months to ensure improvement/resolution is recommended. Patient has not been using her oxygen. Overall patient reports respiratory symptoms have been stable since last appointment. Patient reports fair compliance with inhaled medications Angela Freedman. She admits that she forgets to use it due to breathing better after quitting smoking. Patient using albuterol rarely. Patient reports some physical limitation due to respiratory symptoms. Her past medical history is significant for COPD and high blood pressure. Complaints: shortness of breath   Onset Duration: over a year  Exacerbating factors: strong exertion and cold air  Alleviating factors: rest  Pertinent negatives: none    PSG completed revealing sleep apnea and low oxygen saturations - evaluated by ENMANUEL Medina CNP on 22 and recommended to wear 2 lpm at nighttime and complete CPAP titration study. Order placed for CPAP 12 cm H2O by Dr. Kush Mullins on 10/30/22. Patient declined pulmonary rehab. Allergy medications: Zyrtec and Flonase    Progress History:   Since last visit any new medical issues? No  New ER or hospital visits? No  Any new or changes in medicines? No  Using inhalers? Yes Breztri and as needed albuterol  Are they helpful? Yes   Any recent exacerbations?  No  HRCT 10/21/22 revealed interval development of consolidative opacities in LLL  Screening CT chest 1/5/22: minute lung nodules  and GGO in the lingula  Last PFT: 9/19/2022-severe COPD and decreased diffusion  Last 6 MWT: 3/24/2022- 2 L/min with exertion  Last A1AT: MM     Smoking History:  Quit smoking on November 2022 with 40 pack year history (started smoking at age 23). She previously smoked 2 PPD. Severe opioid use disorder  Tried Chantix in the past, which helped her quit. 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 (Coal, dust, asbestos, molds ie Hay)  Admits to exposure to farm dust. [place]. Admits to exposure to pets/animals at home. 1 dog   Denies exposure to tuberculosis. Denies history of glaucoma or urinary retention. Denies history of radiation therapy to the chest  Her father had esophageal cancer  Her mother had COPD and had a tracheostomy  Her siblings smoke with no lung disease  Reports spot of basal cell cancer on her leg - removed recently by Dr. Patsy Holman      Flu vaccine: did not receive this year  Pneumonia vaccine: not vaccinated  COVID-19 vaccine: vaccinated  Past Medical hx   PMH:  Past Medical History:   Diagnosis Date    COPD (chronic obstructive pulmonary disease) ([facility] [address]     High blood pressure      SURGICAL HISTORY:  Past Surgical History:   Procedure Laterality Date    COLONOSCOPY      CYST REMOVAL      vagina    HERNIA REPAIR  04/01/2002    LAPAROSCOPY  1994?     MOHS SURGERY Left 08/15/2022    MOHS REPAIR BCC LEFT ANTERIOR LOWER LEG WITH FTSG FROM LEFT UPPER THIGH performed by Colten Gooden MD at [address]  04/01/2002    WRIST SURGERY Right      SOCIAL HISTORY:  Social History     Tobacco Use    Smoking status: Former     Packs/day: 1.00     Years: 40.00     Pack years: 40.00     Types: Cigarettes Start date: 1/15/1980     Quit date: 2022     Years since quittin.3    Smokeless tobacco: Never    Tobacco comments:     Quit smoking    Vaping Use    Vaping Use: Never used   Substance Use Topics    Alcohol use: Not Currently    Drug use: Not Currently     Types: Opiates      ALLERGIES:No Known Allergies  FAMILY HISTORY:  Family History   Problem Relation Age of Onset    Diabetes Mother     Heart Disease Mother     Emphysema Mother     Esophageal Cancer Father     Cancer Father     Colon Polyps Sister     High Blood Pressure Neg Hx     Stroke Neg Hx     Colon Cancer Neg Hx      CURRENT MEDICATIONS:  Current Outpatient Medications   Medication Sig Dispense Refill    gabapentin (NEURONTIN) 300 MG capsule take 1 capsule by mouth three times a day 90 capsule 1    meloxicam (MOBIC) 15 MG tablet take 1 tablet by mouth once daily 30 tablet 3    buprenorphine-naloxone (SUBOXONE) 8-2 MG SUBL SL tablet Place 1 tablet under the tongue in the morning and at bedtime for 28 days.  Max Daily Amount: 2 tablets 56 tablet 0    OLANZapine (ZYPREXA) 5 MG tablet take 1 tablet by mouth at bedtime 30 tablet 0    FLUoxetine (PROZAC) 40 MG capsule take 1 capsule by mouth once daily 30 capsule 1    triamterene-hydroCHLOROthiazide (MAXZIDE) 75-50 MG per tablet take 1 tablet by mouth once daily 30 tablet 1    BREZTRI AEROSPHERE 160-9-4.8 MCG/ACT AERO Inhale 2 puffs into the lungs in the morning and at bedtime 10.7 g 11    metoprolol tartrate (LOPRESSOR) 25 MG tablet Take 1 tablet by mouth 2 times daily 180 tablet 3    fluticasone (FLONASE) 50 MCG/ACT nasal spray 1 spray by Nasal route daily [place] towards outside of nose, not towards the septum 16 g 3    ondansetron (ZOFRAN) 4 MG tablet Take 1 tab prn for nausea 30 tablet 0    Multiple Vitamins-Minerals (THERAPEUTIC MULTIVITAMIN-MINERALS) tablet Take 1 tablet by mouth daily      albuterol sulfate HFA (VENTOLIN HFA) 108 (90 Base) MCG/ACT inhaler Inhale 2 puffs into the lungs 4 times daily as needed for Wheezing 54 g 1    naloxone 4 MG/0.1ML LIQD nasal spray 1 spray by Nasal route as needed for Opioid Reversal 1 each 1     No current facility-administered medications for this visit. Viola DE LA CRUZ   Review of Systems   Constitutional:  Negative for appetite change, fever and unexpected weight change. HENT:  Negative for congestion, postnasal drip, rhinorrhea, sinus pressure, sinus pain and sneezing. Respiratory:  Positive for shortness of breath. Negative for cough, chest tightness and wheezing. Denies hemoptysis   Cardiovascular:  Negative for chest pain, palpitations and leg swelling. Genitourinary:  Negative for difficulty urinating. Allergic/Immunologic: Negative for environmental allergies. Physical exam   /80 (Site: Left Upper Arm, Position: Sitting, Cuff Size: Medium Adult)   Pulse 51   Temp 98.4 °F (36.9 °C) (Oral)   Ht 5' 5\" (1.651 m)   Wt 154 lb (69.9 kg)   SpO2 95% Comment: r/a  BMI 25.63 kg/m²    Wt Readings from Last 3 Encounters:   03/02/23 154 lb (69.9 kg)   02/27/23 148 lb (67.1 kg)   01/30/23 147 lb (66.7 kg)       Physical Exam  Constitutional:       General: She is not in acute distress. Comments: BMI 25.6   HENT:      Head: Normocephalic and atraumatic. Right Ear: External ear normal.      Left Ear: External ear normal.      Mouth/Throat:      Mouth: Mucous membranes are moist.      Pharynx: No oropharyngeal exudate or posterior oropharyngeal erythema. Eyes:      General:         Right eye: No discharge. Left eye: No discharge. Cardiovascular:      Rate and Rhythm: Normal rate and regular rhythm. Pulmonary:      Effort: Pulmonary effort is normal. No respiratory distress. Breath sounds: No wheezing, rhonchi or rales. Chest:      Chest wall: No tenderness. Musculoskeletal:      Cervical back: Neck supple. Right lower leg: No edema. Left lower leg: No edema.    Skin:     General: Skin is warm and dry. Neurological:      General: No focal deficit present. Mental Status: She is alert. Psychiatric:         Mood and Affect: Mood normal.         Behavior: Behavior normal.         Thought Content: Thought content normal.        Results   Lung Nodule Screening     [x] Qualifies    [] Does not qualify   [] Declined    [] Completed  40 PY history   The USPSTF recommends annual screening for lung cancer with low-dose computed tomography (LDCT) in adults aged 48 to 80 years who have a 20 pack-year smoking history and currently smoke or have quit within the past 15 years. Screening should be discontinued once a person has not smoked for 15 years or develops a health problem that substantially limits life expectancy or the ability or willingness to have curative lung surgery. CT Chest 1/5/23  Narrative   PROCEDURE: CT CHEST WO CONTRAST       CLINICAL INFORMATION: Consolidation of left lower lobe of lung. COMPARISON: CT chest 10/21/2022. TECHNIQUE:    5 mm axial imaging through the chest without contrast. Coronal and sagittal reconstructions were performed. All CT scans at this facility use dose modulation, iterative reconstruction, and/or weight based dosing when appropriate to reduce the radiation dose to as low as reasonably achievable. FINDINGS:   Heart/mediastinum: The thyroid gland is unremarkable. The heart size is normal. No pericardial effusion is observed. Coronary artery calcifications are visualized. The aorta is not dilated. No lymphadenopathy is visualized accounting for lack of IV    contrast.       Lungs: The linear opacity in the left lower lobe posteriorly has partially resolved (series 2, images 42 and 43). There continues to be patchy reticular nodular groundglass opacity in the left lower lobe posteriorly. No focal consolidation, pleural    effusion, or pneumothorax is identified. Centrilobular emphysema is present.  Scattered benign calcified granulomas are unchanged. A 5.5 mm partially calcified left upper lobe pulmonary nodule is stable (series 2, image 21). A 3 mm right upper lobe    pulmonary nodule is stable (series 2, image 29). Upper abdomen: No acute findings are noted in the limited images through the upper abdomen. Musculoskeletal:  Multilevel degenerative disc disease in the thoracic spine is stable. The visualized skeletal structures appear intact. Impression       The area of linear and irregular consolidation in the left lower lobe posteriorly has partially resolved. There continues to be patchy reticular nodular groundglass opacity in the left lower lobe posteriorly, possibly infectious/inflammatory. A 3 month    follow-up noncontrast CT thorax is advised to ensure continued improvement/resolution. **This report has been created using voice recognition software. It may contain minor errors which are inherent in voice recognition technology. **       Final report electronically signed by Dr Rita Mckeon on 1/5/2023 10:27 AM                   Six Minute Walk Test  Kathleen Josr 1965    Six minute walk test done in my office today by my medical assistant. Zaria Tena's oxygen saturation at rest on room air was 94%. Her oxygen saturation dropped to 86% on room air with exertion after walking 216 feet and within 4 minutes. The six minute walk test was repeated with oxygen supplementation. Oxygen supplementation was started with 1 LPM via nasal cannula and titrated to 1 LPM via nasal cannula. At the end of the test Kayla Guajardo's oxygen saturation remained at 92% on 1 LPM with exertion. She is mobile in the home and requires oxygen as outlined above. Patient ambulated a total of 648 feet with oxygen. Resting Dyspnea/Len score was 0 / 0  and 0 / 0  upon completion of the walk. Resting heart rate was  48 bpm and 62 bpm upon completing the walk.     Nasal Oxygen order:  1 lpm to be used with:  Rest: No.  Walking: Yes. Sleep: No.   POC flow: No.  Continuous flow: Yes. DME Medical Necessity Documentation    Yenifer Jean was seen in the office on 3/2/2023 for the diagnosis COPD. I am prescribing oxygen because the diagnosis and testing requires the patient to have oxygen in the home. her condition will improve or be benefited by oxygen use. The patient is able to perform good mobility in a home setting and therefore does require the use of a portable oxygen system. Assessment      Diagnosis Orders   1. Stage 3 severe COPD by GOLD classification ([address]  6 Minute Walk Test    DME Order for Home Oxygen as OP      2. Chronic respiratory failure with hypoxia (HCC)  6 Minute Walk Test    DME Order for Home Oxygen as OP      3. Atelectasis  DME Order for [facility]) as OP    CT CHEST WO CONTRAST      4. Consolidation of left lower lobe of lung ([address]  CT CHEST WO CONTRAST      5. Ground glass opacity present on imaging of lung  CT CHEST WO CONTRAST            Plan   1. Stage 3 severe COPD by GOLD classification ([address]  -Symptoms well controlled. Advised better compliance with Breztri  -Continue albuterol inhaler and nebulizer, one or the other, every 6 hours as needed for shortness of breath or wheezing   -Advised continued smoking cessation!  -Reviewed preventative vaccinations    2. Chronic respiratory failure with hypoxia (HCC)  -6 Minute Walk Test - 1 lpm with exertion  - DME Order for Home Oxygen as OP    3. Atelectasis  -DME Order for (incentive spirometer) as OP to use 4 times daily    4. Consolidation of left lower lobe of lung with Ground glass opacity present on imaging of lung  -Obtain CT CHEST WO CONTRAST in 1 month to follow consolidation and groundglass opacities     Advised patient to call office with any changes, questions, or concerns regarding respiratory status or issues with prescribed medications    Return in about 4 weeks (around 3/30/2023) for COPD with CT Chest prior. Electronically signed by ENMANUEL Fink CNP on 3/2/2023 at 3:39 PM     (Please note that portions of this note may have been completed with a voice recognition program. Efforts were made to edit the dictation but occasionally words are mis-transcribed)

## 2023-03-06 ASSESSMENT — ENCOUNTER SYMPTOMS
WHEEZING: 0
DIARRHEA: 0
VOMITING: 0
NAUSEA: 0
CONSTIPATION: 0
PHOTOPHOBIA: 0
SHORTNESS OF BREATH: 1
BACK PAIN: 1
ABDOMINAL DISTENTION: 0
COUGH: 0
RHINORRHEA: 0
BLOOD IN STOOL: 0
SINUS PAIN: 0
TROUBLE SWALLOWING: 0
SORE THROAT: 0
ABDOMINAL PAIN: 0
SINUS PRESSURE: 0

## 2023-03-22 DIAGNOSIS — I10 HYPERTENSION, UNSPECIFIED TYPE: ICD-10-CM

## 2023-03-22 RX ORDER — TRIAMTERENE AND HYDROCHLOROTHIAZIDE 75; 50 MG/1; MG/1
TABLET ORAL
Qty: 30 TABLET | Refills: 1 | Status: SHIPPED | OUTPATIENT
Start: 2023-03-22 | End: 2023-05-18 | Stop reason: SDUPTHER

## 2023-03-27 ENCOUNTER — NURSE ONLY (OUTPATIENT)
Dept: INTERNAL MEDICINE CLINIC | Age: 58
End: 2023-03-27
Payer: COMMERCIAL

## 2023-03-27 VITALS
SYSTOLIC BLOOD PRESSURE: 149 MMHG | HEART RATE: 64 BPM | DIASTOLIC BLOOD PRESSURE: 65 MMHG | BODY MASS INDEX: 25.66 KG/M2 | WEIGHT: 154 LBS | HEIGHT: 65 IN

## 2023-03-27 DIAGNOSIS — F11.20 SEVERE OPIOID USE DISORDER (HCC): Primary | ICD-10-CM

## 2023-03-27 PROCEDURE — 80305 DRUG TEST PRSMV DIR OPT OBS: CPT | Performed by: NURSE PRACTITIONER

## 2023-03-30 RX ORDER — BUPRENORPHINE HYDROCHLORIDE AND NALOXONE HYDROCHLORIDE DIHYDRATE 8; 2 MG/1; MG/1
1 TABLET SUBLINGUAL 2 TIMES DAILY
Qty: 28 TABLET | Refills: 0 | Status: SHIPPED | OUTPATIENT
Start: 2023-03-30 | End: 2023-04-13

## 2023-04-12 ENCOUNTER — OFFICE VISIT (OUTPATIENT)
Dept: INTERNAL MEDICINE CLINIC | Age: 58
End: 2023-04-12
Payer: COMMERCIAL

## 2023-04-12 VITALS
DIASTOLIC BLOOD PRESSURE: 78 MMHG | RESPIRATION RATE: 16 BRPM | HEIGHT: 65 IN | BODY MASS INDEX: 25.99 KG/M2 | HEART RATE: 60 BPM | SYSTOLIC BLOOD PRESSURE: 140 MMHG | WEIGHT: 156 LBS

## 2023-04-12 DIAGNOSIS — F11.20 SEVERE OPIOID USE DISORDER (HCC): ICD-10-CM

## 2023-04-12 DIAGNOSIS — F33.1 MAJOR DEPRESSIVE DISORDER, RECURRENT, MODERATE (HCC): ICD-10-CM

## 2023-04-12 DIAGNOSIS — R60.9 SWELLING: Primary | ICD-10-CM

## 2023-04-12 DIAGNOSIS — R63.5 WEIGHT GAIN: ICD-10-CM

## 2023-04-12 DIAGNOSIS — E43 SEVERE MALNUTRITION (HCC): ICD-10-CM

## 2023-04-12 PROCEDURE — 3017F COLORECTAL CA SCREEN DOC REV: CPT | Performed by: NURSE PRACTITIONER

## 2023-04-12 PROCEDURE — 99214 OFFICE O/P EST MOD 30 MIN: CPT | Performed by: NURSE PRACTITIONER

## 2023-04-12 PROCEDURE — 1036F TOBACCO NON-USER: CPT | Performed by: NURSE PRACTITIONER

## 2023-04-12 PROCEDURE — G8427 DOCREV CUR MEDS BY ELIG CLIN: HCPCS | Performed by: NURSE PRACTITIONER

## 2023-04-12 PROCEDURE — G8419 CALC BMI OUT NRM PARAM NOF/U: HCPCS | Performed by: NURSE PRACTITIONER

## 2023-04-12 RX ORDER — BUPRENORPHINE HYDROCHLORIDE AND NALOXONE HYDROCHLORIDE DIHYDRATE 8; 2 MG/1; MG/1
1 TABLET SUBLINGUAL 2 TIMES DAILY
Qty: 28 TABLET | Refills: 0 | Status: SHIPPED | OUTPATIENT
Start: 2023-04-12 | End: 2023-04-26

## 2023-04-12 RX ORDER — CITALOPRAM 10 MG/1
10 TABLET ORAL DAILY
Qty: 30 TABLET | Refills: 0 | Status: SHIPPED | OUTPATIENT
Start: 2023-04-12

## 2023-04-20 ASSESSMENT — ENCOUNTER SYMPTOMS
CONSTIPATION: 0
COUGH: 0
SORE THROAT: 0
ABDOMINAL PAIN: 0
DIARRHEA: 0
BLOOD IN STOOL: 0
SINUS PAIN: 0
NAUSEA: 0
BACK PAIN: 1
ABDOMINAL DISTENTION: 0
SINUS PRESSURE: 0
WHEEZING: 0
VOMITING: 0
PHOTOPHOBIA: 0
RHINORRHEA: 0
TROUBLE SWALLOWING: 0
SHORTNESS OF BREATH: 1

## 2023-04-24 NOTE — PROGRESS NOTES
[facility]    Patient: Jimmy Fields, 59 y.o.   : 1965    Patient of Dr. Zelda Mills   Patient presents with    Follow-up     COPD 3 month f/u with CT 23        HPI  Elaina Gr is here for follow up for COPD and CT follow up. Patient was ordered to use an incentive spirometer at her last appointment. She is here with CT Chest that revealed small tree-in-bud opacities in the LLL that are less numerous and less prominent and the patchy LLL groundglass opacities have resolved. Patient reports poor compliance with oxygen. Overall patient reports respiratory symptoms have been better since last appointment. Patient reports poor compliance with inhaled medications Paulino Kingsley). She admits that she is only using occasionally. Patient using albuterol rarely. Patient reports no physical limitation due to respiratory symptoms. Her past medical history is significant for COPD and high blood pressure. Patient gained 3 lbs since last appointment     Complaints: postnasal drip, rhinorrhea, sneezing, and itchy eyes  Pertinent negatives: shortness of breath, wheezing, chest tightness, cough, sputum production, hemoptysis  Risk factors for lung disease: animal exposure    PSG completed revealing sleep apnea and low oxygen saturations - evaluated by ENMANUEL Spann CNP on 22 and recommended to wear 2 lpm at nighttime and complete CPAP titration study. Order placed for CPAP 12 cm H2O by Dr. Alma Delia Goel on 10/30/22. Allergy medications: Zyrtec and Flonase    Progress History:   Since last visit any new medical issues? Yes weight gain  New ER or hospital visits? No  Any new or changes in medicines? No  Using inhalers? Yes Breztri and as needed albuterol  Are they helpful? Yes   Any recent exacerbations?  No  HRCT 10/21/22 - revealed interval development of consolidative opacities in LLL  Screening CT chest 22: minute lung

## 2023-04-25 ENCOUNTER — OFFICE VISIT (OUTPATIENT)
Dept: PULMONOLOGY | Age: 58
End: 2023-04-25
Payer: COMMERCIAL

## 2023-04-25 VITALS
DIASTOLIC BLOOD PRESSURE: 60 MMHG | OXYGEN SATURATION: 92 % | WEIGHT: 157.6 LBS | SYSTOLIC BLOOD PRESSURE: 118 MMHG | TEMPERATURE: 97.9 F | BODY MASS INDEX: 26.26 KG/M2 | HEART RATE: 51 BPM | HEIGHT: 65 IN

## 2023-04-25 DIAGNOSIS — J96.11 CHRONIC RESPIRATORY FAILURE WITH HYPOXIA (HCC): ICD-10-CM

## 2023-04-25 DIAGNOSIS — Z91.148 NON COMPLIANCE W MEDICATION REGIMEN: ICD-10-CM

## 2023-04-25 DIAGNOSIS — R91.8 LUNG NODULES: ICD-10-CM

## 2023-04-25 DIAGNOSIS — R94.2 DECREASED DIFFUSION CAPACITY OF LUNG: ICD-10-CM

## 2023-04-25 DIAGNOSIS — Z87.891 PERSONAL HISTORY OF TOBACCO USE: ICD-10-CM

## 2023-04-25 DIAGNOSIS — J44.9 STAGE 3 SEVERE COPD BY GOLD CLASSIFICATION (HCC): Primary | ICD-10-CM

## 2023-04-25 DIAGNOSIS — J30.2 SEASONAL ALLERGIES: ICD-10-CM

## 2023-04-25 DIAGNOSIS — R91.8 GROUND GLASS OPACITY PRESENT ON IMAGING OF LUNG: ICD-10-CM

## 2023-04-25 PROCEDURE — G8419 CALC BMI OUT NRM PARAM NOF/U: HCPCS

## 2023-04-25 PROCEDURE — 3023F SPIROM DOC REV: CPT

## 2023-04-25 PROCEDURE — 1036F TOBACCO NON-USER: CPT

## 2023-04-25 PROCEDURE — 3017F COLORECTAL CA SCREEN DOC REV: CPT

## 2023-04-25 PROCEDURE — G8427 DOCREV CUR MEDS BY ELIG CLIN: HCPCS

## 2023-04-25 PROCEDURE — 99214 OFFICE O/P EST MOD 30 MIN: CPT

## 2023-04-25 ASSESSMENT — ENCOUNTER SYMPTOMS
SHORTNESS OF BREATH: 0
COUGH: 0
SINUS PRESSURE: 0
CHEST TIGHTNESS: 0
RHINORRHEA: 1
EYE ITCHING: 1
SINUS PAIN: 0
WHEEZING: 0

## 2023-04-25 NOTE — PATIENT INSTRUCTIONS
Continue Breztri 2 puffs twice daily. Rinse mouth with water, gargle, and spit after use. Continue your albuterol inhaler. You may take 2 puffs every 6 hours as needed for shortness of breath or wheezing. Make sure to use 1 lpm of oxygen with exertion. Please call if your oxygen levels are dropping below 89%. I will see you back in 6 months.

## 2023-04-26 DIAGNOSIS — F32.A ANXIETY AND DEPRESSION: ICD-10-CM

## 2023-04-26 DIAGNOSIS — J44.9 STAGE 3 SEVERE COPD BY GOLD CLASSIFICATION (HCC): ICD-10-CM

## 2023-04-26 DIAGNOSIS — F41.9 ANXIETY AND DEPRESSION: ICD-10-CM

## 2023-04-27 ENCOUNTER — OFFICE VISIT (OUTPATIENT)
Dept: INTERNAL MEDICINE CLINIC | Age: 58
End: 2023-04-27
Payer: COMMERCIAL

## 2023-04-27 ENCOUNTER — NURSE ONLY (OUTPATIENT)
Dept: LAB | Age: 58
End: 2023-04-27

## 2023-04-27 VITALS
WEIGHT: 160 LBS | SYSTOLIC BLOOD PRESSURE: 112 MMHG | HEART RATE: 53 BPM | HEIGHT: 65 IN | DIASTOLIC BLOOD PRESSURE: 67 MMHG | RESPIRATION RATE: 20 BRPM | BODY MASS INDEX: 26.66 KG/M2

## 2023-04-27 DIAGNOSIS — J44.9 STAGE 3 SEVERE COPD BY GOLD CLASSIFICATION (HCC): ICD-10-CM

## 2023-04-27 DIAGNOSIS — R60.9 SWELLING: ICD-10-CM

## 2023-04-27 DIAGNOSIS — F11.20 SEVERE OPIOID USE DISORDER (HCC): Primary | ICD-10-CM

## 2023-04-27 DIAGNOSIS — G89.4 CHRONIC PAIN SYNDROME: ICD-10-CM

## 2023-04-27 DIAGNOSIS — R63.5 WEIGHT GAIN: ICD-10-CM

## 2023-04-27 LAB
ALBUMIN SERPL BCG-MCNC: 4.8 G/DL (ref 3.5–5.1)
ALCOHOL URINE: ABNORMAL
ALP SERPL-CCNC: 93 U/L (ref 38–126)
ALT SERPL W/O P-5'-P-CCNC: 19 U/L (ref 11–66)
AMPHETAMINE SCREEN, URINE: ABNORMAL
ANION GAP SERPL CALC-SCNC: 13 MEQ/L (ref 8–16)
AST SERPL-CCNC: 25 U/L (ref 5–40)
BARBITURATE SCREEN, URINE: ABNORMAL
BENZODIAZEPINE SCREEN, URINE: ABNORMAL
BILIRUB SERPL-MCNC: < 0.2 MG/DL (ref 0.3–1.2)
BUN SERPL-MCNC: 47 MG/DL (ref 7–22)
BUPRENORPHINE URINE: ABNORMAL
CALCIUM SERPL-MCNC: 9.8 MG/DL (ref 8.5–10.5)
CHLORIDE SERPL-SCNC: 104 MEQ/L (ref 98–111)
CHOLEST SERPL-MCNC: 243 MG/DL (ref 100–199)
CO2 SERPL-SCNC: 27 MEQ/L (ref 23–33)
COCAINE METABOLITE SCREEN URINE: ABNORMAL
CREAT SERPL-MCNC: 1.9 MG/DL (ref 0.4–1.2)
DEPRECATED RDW RBC AUTO: 51.1 FL (ref 35–45)
ERYTHROCYTE [DISTWIDTH] IN BLOOD BY AUTOMATED COUNT: 13.9 % (ref 11.5–14.5)
FENTANYL SCREEN, URINE: ABNORMAL
GABAPENTIN SCREEN, URINE: ABNORMAL
GFR SERPL CREATININE-BSD FRML MDRD: 30 ML/MIN/1.73M2
GLUCOSE SERPL-MCNC: 91 MG/DL (ref 70–108)
HCT VFR BLD AUTO: 42.8 % (ref 37–47)
HDLC SERPL-MCNC: 62 MG/DL
HGB BLD-MCNC: 13.2 GM/DL (ref 12–16)
LDLC SERPL CALC-MCNC: 136 MG/DL
MCH RBC QN AUTO: 30.8 PG (ref 26–33)
MCHC RBC AUTO-ENTMCNC: 30.8 GM/DL (ref 32.2–35.5)
MCV RBC AUTO: 100 FL (ref 81–99)
MDMA URINE: ABNORMAL
METHADONE SCREEN, URINE: ABNORMAL
METHAMPHETAMINE, URINE: ABNORMAL
NT-PROBNP SERPL IA-MCNC: 458.2 PG/ML (ref 0–124)
OPIATE SCREEN URINE: ABNORMAL
OXYCODONE SCREEN URINE: ABNORMAL
PHENCYCLIDINE SCREEN URINE: ABNORMAL
PLATELET # BLD AUTO: 256 THOU/MM3 (ref 130–400)
PMV BLD AUTO: 9.5 FL (ref 9.4–12.4)
POTASSIUM SERPL-SCNC: 4.3 MEQ/L (ref 3.5–5.2)
PROPOXYPHENE SCREEN, URINE: ABNORMAL
PROT SERPL-MCNC: 7.3 G/DL (ref 6.1–8)
RBC # BLD AUTO: 4.28 MILL/MM3 (ref 4.2–5.4)
SODIUM SERPL-SCNC: 144 MEQ/L (ref 135–145)
SYNTHETIC CANNABINOIDS(K2) SCREEN, URINE: ABNORMAL
T4 FREE SERPL-MCNC: 0.34 NG/DL (ref 0.93–1.76)
THC SCREEN, URINE: ABNORMAL
TRAMADOL SCREEN URINE: ABNORMAL
TRICYCLIC ANTIDEPRESSANTS, UR: ABNORMAL
TRIGL SERPL-MCNC: 224 MG/DL (ref 0–199)
TSH SERPL DL<=0.005 MIU/L-ACNC: 87.45 UIU/ML (ref 0.4–4.2)
WBC # BLD AUTO: 9.1 THOU/MM3 (ref 4.8–10.8)

## 2023-04-27 PROCEDURE — 3023F SPIROM DOC REV: CPT | Performed by: NURSE PRACTITIONER

## 2023-04-27 PROCEDURE — 80305 DRUG TEST PRSMV DIR OPT OBS: CPT | Performed by: NURSE PRACTITIONER

## 2023-04-27 PROCEDURE — 3017F COLORECTAL CA SCREEN DOC REV: CPT | Performed by: NURSE PRACTITIONER

## 2023-04-27 PROCEDURE — 99214 OFFICE O/P EST MOD 30 MIN: CPT | Performed by: NURSE PRACTITIONER

## 2023-04-27 PROCEDURE — 1036F TOBACCO NON-USER: CPT | Performed by: NURSE PRACTITIONER

## 2023-04-27 PROCEDURE — G8427 DOCREV CUR MEDS BY ELIG CLIN: HCPCS | Performed by: NURSE PRACTITIONER

## 2023-04-27 PROCEDURE — G8419 CALC BMI OUT NRM PARAM NOF/U: HCPCS | Performed by: NURSE PRACTITIONER

## 2023-04-27 RX ORDER — BUPRENORPHINE HYDROCHLORIDE AND NALOXONE HYDROCHLORIDE DIHYDRATE 8; 2 MG/1; MG/1
1 TABLET SUBLINGUAL 2 TIMES DAILY
Qty: 56 TABLET | Refills: 0 | Status: SHIPPED | OUTPATIENT
Start: 2023-04-27 | End: 2023-05-25

## 2023-04-27 RX ORDER — BUDESONIDE, GLYCOPYRROLATE, AND FORMOTEROL FUMARATE 160; 9; 4.8 UG/1; UG/1; UG/1
AEROSOL, METERED RESPIRATORY (INHALATION)
Qty: 10.7 G | Refills: 11 | Status: SHIPPED | OUTPATIENT
Start: 2023-04-27

## 2023-04-27 RX ORDER — OLANZAPINE 5 MG/1
TABLET ORAL
Qty: 30 TABLET | Refills: 0 | Status: SHIPPED | OUTPATIENT
Start: 2023-04-27

## 2023-04-27 RX ORDER — BUDESONIDE, GLYCOPYRROLATE, AND FORMOTEROL FUMARATE 160; 9; 4.8 UG/1; UG/1; UG/1
AEROSOL, METERED RESPIRATORY (INHALATION)
Qty: 10.7 G | Refills: 11 | Status: SHIPPED | OUTPATIENT
Start: 2023-04-27 | End: 2023-04-27 | Stop reason: SDUPTHER

## 2023-04-27 RX ORDER — CITALOPRAM 10 MG/1
10 TABLET ORAL DAILY
Qty: 30 TABLET | Refills: 0 | Status: SHIPPED | OUTPATIENT
Start: 2023-04-27

## 2023-04-27 RX ORDER — GABAPENTIN 300 MG/1
300 CAPSULE ORAL 3 TIMES DAILY
Qty: 90 CAPSULE | Refills: 1 | Status: SHIPPED | OUTPATIENT
Start: 2023-04-27 | End: 2023-06-26

## 2023-04-27 ASSESSMENT — ENCOUNTER SYMPTOMS
ABDOMINAL DISTENTION: 0
RHINORRHEA: 0
SORE THROAT: 0
CONSTIPATION: 0
COUGH: 0
DIARRHEA: 0
SHORTNESS OF BREATH: 1
ABDOMINAL PAIN: 0
BACK PAIN: 1
PHOTOPHOBIA: 0
BLOOD IN STOOL: 0
SINUS PRESSURE: 0
NAUSEA: 0
TROUBLE SWALLOWING: 0
SINUS PAIN: 0
VOMITING: 0
WHEEZING: 0

## 2023-04-27 NOTE — PROGRESS NOTES
Negative for abdominal distention, abdominal pain, blood in stool, constipation, diarrhea, nausea and vomiting. Endocrine: Negative for polydipsia, polyphagia and polyuria. Genitourinary:  Negative for difficulty urinating, dysuria, frequency, hematuria and urgency. Musculoskeletal:  Positive for back pain (chronic). Negative for arthralgias and myalgias. Skin:  Negative for rash and wound. Neurological:  Negative for dizziness, light-headedness and headaches. Psychiatric/Behavioral:  Positive for dysphoric mood. Negative for sleep disturbance. The patient is not nervous/anxious. Objective:     /67 (Site: Left Lower Arm, Position: Sitting, Cuff Size: Medium Adult)   Pulse 53   Resp 20   Ht 5' 5\" (1.651 m)   Wt 160 lb (72.6 kg)   BMI 26.63 kg/m²     Physical Exam  Vitals reviewed. Constitutional:       General: She is not in acute distress. Appearance: Normal appearance. She is not ill-appearing. HENT:      Head: Normocephalic and atraumatic. Right Ear: External ear normal.      Left Ear: External ear normal.      Nose: Nose normal. No congestion or rhinorrhea. Mouth/Throat:      Mouth: Mucous membranes are moist.   Eyes:      Extraocular Movements: Extraocular movements intact. Conjunctiva/sclera: Conjunctivae normal.      Pupils: Pupils are equal, round, and reactive to light. Cardiovascular:      Rate and Rhythm: Normal rate and regular rhythm. Pulses: Normal pulses. Heart sounds: Normal heart sounds. No murmur heard. Pulmonary:      Effort: Pulmonary effort is normal. No respiratory distress. Breath sounds: Examination of the right-lower field reveals decreased breath sounds. Examination of the left-lower field reveals decreased breath sounds. Decreased breath sounds present. No rhonchi. Abdominal:      General: Abdomen is flat. Bowel sounds are normal. There is no distension. Palpations: Abdomen is soft.    Musculoskeletal:

## 2023-04-28 ENCOUNTER — TELEPHONE (OUTPATIENT)
Dept: INTERNAL MEDICINE CLINIC | Age: 58
End: 2023-04-28

## 2023-04-28 DIAGNOSIS — E03.9 HYPOTHYROIDISM, UNSPECIFIED TYPE: Primary | ICD-10-CM

## 2023-04-28 RX ORDER — LEVOTHYROXINE SODIUM 0.05 MG/1
50 TABLET ORAL DAILY
Qty: 90 TABLET | Refills: 1 | Status: SHIPPED | OUTPATIENT
Start: 2023-04-28

## 2023-05-18 DIAGNOSIS — I10 HYPERTENSION, UNSPECIFIED TYPE: ICD-10-CM

## 2023-05-18 RX ORDER — TRIAMTERENE AND HYDROCHLOROTHIAZIDE 75; 50 MG/1; MG/1
1 TABLET ORAL DAILY
Qty: 30 TABLET | Refills: 1 | Status: SHIPPED | OUTPATIENT
Start: 2023-05-18

## 2023-06-01 ENCOUNTER — NURSE ONLY (OUTPATIENT)
Dept: LAB | Age: 58
End: 2023-06-01

## 2023-06-01 ENCOUNTER — OFFICE VISIT (OUTPATIENT)
Dept: INTERNAL MEDICINE CLINIC | Age: 58
End: 2023-06-01
Payer: COMMERCIAL

## 2023-06-01 DIAGNOSIS — G89.4 CHRONIC PAIN SYNDROME: ICD-10-CM

## 2023-06-01 DIAGNOSIS — E03.9 HYPOTHYROIDISM, UNSPECIFIED TYPE: ICD-10-CM

## 2023-06-01 DIAGNOSIS — R79.89 ELEVATED SERUM CREATININE: ICD-10-CM

## 2023-06-01 DIAGNOSIS — F41.9 ANXIETY AND DEPRESSION: ICD-10-CM

## 2023-06-01 DIAGNOSIS — I10 HYPERTENSION, UNSPECIFIED TYPE: Primary | ICD-10-CM

## 2023-06-01 DIAGNOSIS — F11.20 SEVERE OPIOID USE DISORDER (HCC): ICD-10-CM

## 2023-06-01 DIAGNOSIS — F32.A ANXIETY AND DEPRESSION: ICD-10-CM

## 2023-06-01 LAB
ALBUMIN SERPL BCG-MCNC: 5.1 G/DL (ref 3.5–5.1)
ALCOHOL URINE: ABNORMAL
ALP SERPL-CCNC: 96 U/L (ref 38–126)
ALT SERPL W/O P-5'-P-CCNC: 17 U/L (ref 11–66)
AMPHETAMINE SCREEN, URINE: ABNORMAL
ANION GAP SERPL CALC-SCNC: 11 MEQ/L (ref 8–16)
AST SERPL-CCNC: 20 U/L (ref 5–40)
BARBITURATE SCREEN, URINE: ABNORMAL
BENZODIAZEPINE SCREEN, URINE: ABNORMAL
BILIRUB SERPL-MCNC: 0.4 MG/DL (ref 0.3–1.2)
BUN SERPL-MCNC: 37 MG/DL (ref 7–22)
BUPRENORPHINE URINE: ABNORMAL
CALCIUM SERPL-MCNC: 10.5 MG/DL (ref 8.5–10.5)
CHLORIDE SERPL-SCNC: 97 MEQ/L (ref 98–111)
CO2 SERPL-SCNC: 30 MEQ/L (ref 23–33)
COCAINE METABOLITE SCREEN URINE: ABNORMAL
CREAT SERPL-MCNC: 1.3 MG/DL (ref 0.4–1.2)
FENTANYL SCREEN, URINE: ABNORMAL
GABAPENTIN SCREEN, URINE: ABNORMAL
GFR SERPL CREATININE-BSD FRML MDRD: 47 ML/MIN/1.73M2
GLUCOSE SERPL-MCNC: 108 MG/DL (ref 70–108)
MDMA URINE: ABNORMAL
METHADONE SCREEN, URINE: ABNORMAL
METHAMPHETAMINE, URINE: ABNORMAL
OPIATE SCREEN URINE: ABNORMAL
OXYCODONE SCREEN URINE: ABNORMAL
PHENCYCLIDINE SCREEN URINE: ABNORMAL
POTASSIUM SERPL-SCNC: 4.2 MEQ/L (ref 3.5–5.2)
PROPOXYPHENE SCREEN, URINE: ABNORMAL
PROT SERPL-MCNC: 8 G/DL (ref 6.1–8)
SODIUM SERPL-SCNC: 138 MEQ/L (ref 135–145)
SYNTHETIC CANNABINOIDS(K2) SCREEN, URINE: ABNORMAL
T4 FREE SERPL-MCNC: 1.14 NG/DL (ref 0.93–1.76)
THC SCREEN, URINE: ABNORMAL
TRAMADOL SCREEN URINE: ABNORMAL
TRICYCLIC ANTIDEPRESSANTS, UR: ABNORMAL
TSH SERPL DL<=0.005 MIU/L-ACNC: 18.04 UIU/ML (ref 0.4–4.2)

## 2023-06-01 PROCEDURE — 80305 DRUG TEST PRSMV DIR OPT OBS: CPT | Performed by: NURSE PRACTITIONER

## 2023-06-01 PROCEDURE — 99214 OFFICE O/P EST MOD 30 MIN: CPT | Performed by: NURSE PRACTITIONER

## 2023-06-01 PROCEDURE — 1036F TOBACCO NON-USER: CPT | Performed by: NURSE PRACTITIONER

## 2023-06-01 PROCEDURE — G8428 CUR MEDS NOT DOCUMENT: HCPCS | Performed by: NURSE PRACTITIONER

## 2023-06-01 PROCEDURE — 3017F COLORECTAL CA SCREEN DOC REV: CPT | Performed by: NURSE PRACTITIONER

## 2023-06-01 PROCEDURE — G8419 CALC BMI OUT NRM PARAM NOF/U: HCPCS | Performed by: NURSE PRACTITIONER

## 2023-06-01 RX ORDER — OLANZAPINE 5 MG/1
5 TABLET ORAL NIGHTLY
Qty: 30 TABLET | Refills: 0 | Status: SHIPPED | OUTPATIENT
Start: 2023-06-01

## 2023-06-01 RX ORDER — MELOXICAM 15 MG/1
TABLET ORAL
Qty: 30 TABLET | Refills: 3 | Status: SHIPPED | OUTPATIENT
Start: 2023-06-01

## 2023-06-01 RX ORDER — CITALOPRAM 10 MG/1
10 TABLET ORAL DAILY
Qty: 30 TABLET | Refills: 0 | Status: SHIPPED | OUTPATIENT
Start: 2023-06-01

## 2023-06-01 RX ORDER — FLUOXETINE HYDROCHLORIDE 40 MG/1
40 CAPSULE ORAL DAILY
Qty: 30 CAPSULE | Refills: 1 | Status: SHIPPED | OUTPATIENT
Start: 2023-06-01

## 2023-06-01 RX ORDER — BUPRENORPHINE HYDROCHLORIDE AND NALOXONE HYDROCHLORIDE DIHYDRATE 8; 2 MG/1; MG/1
1 TABLET SUBLINGUAL 2 TIMES DAILY
Qty: 56 TABLET | Refills: 0 | Status: SHIPPED | OUTPATIENT
Start: 2023-06-01 | End: 2023-06-29

## 2023-06-08 ASSESSMENT — ENCOUNTER SYMPTOMS
SINUS PRESSURE: 0
BACK PAIN: 1
COUGH: 0
ABDOMINAL DISTENTION: 0
SINUS PAIN: 0
RHINORRHEA: 0
SHORTNESS OF BREATH: 1
PHOTOPHOBIA: 0
WHEEZING: 0
BLOOD IN STOOL: 0
SORE THROAT: 0
TROUBLE SWALLOWING: 0
NAUSEA: 0
VOMITING: 0
CONSTIPATION: 0
ABDOMINAL PAIN: 0
DIARRHEA: 0

## 2023-06-26 ENCOUNTER — OFFICE VISIT (OUTPATIENT)
Dept: NEPHROLOGY | Age: 58
End: 2023-06-26
Payer: COMMERCIAL

## 2023-06-26 VITALS
DIASTOLIC BLOOD PRESSURE: 60 MMHG | OXYGEN SATURATION: 89 % | SYSTOLIC BLOOD PRESSURE: 108 MMHG | BODY MASS INDEX: 26.86 KG/M2 | HEART RATE: 63 BPM | WEIGHT: 161.4 LBS

## 2023-06-26 DIAGNOSIS — I10 ESSENTIAL HYPERTENSION: ICD-10-CM

## 2023-06-26 DIAGNOSIS — N18.31 STAGE 3A CHRONIC KIDNEY DISEASE (HCC): Primary | ICD-10-CM

## 2023-06-26 PROCEDURE — 99204 OFFICE O/P NEW MOD 45 MIN: CPT | Performed by: INTERNAL MEDICINE

## 2023-06-26 PROCEDURE — G8428 CUR MEDS NOT DOCUMENT: HCPCS | Performed by: INTERNAL MEDICINE

## 2023-06-26 PROCEDURE — 3017F COLORECTAL CA SCREEN DOC REV: CPT | Performed by: INTERNAL MEDICINE

## 2023-06-26 PROCEDURE — 1036F TOBACCO NON-USER: CPT | Performed by: INTERNAL MEDICINE

## 2023-06-26 PROCEDURE — 3074F SYST BP LT 130 MM HG: CPT | Performed by: INTERNAL MEDICINE

## 2023-06-26 PROCEDURE — G8419 CALC BMI OUT NRM PARAM NOF/U: HCPCS | Performed by: INTERNAL MEDICINE

## 2023-06-26 PROCEDURE — 3078F DIAST BP <80 MM HG: CPT | Performed by: INTERNAL MEDICINE

## 2023-06-28 ENCOUNTER — HOSPITAL ENCOUNTER (OUTPATIENT)
Dept: ULTRASOUND IMAGING | Age: 58
Discharge: HOME | End: 2023-06-28
Attending: INTERNAL MEDICINE
Payer: COMMERCIAL

## 2023-06-28 DIAGNOSIS — I10 ESSENTIAL HYPERTENSION: ICD-10-CM

## 2023-06-28 DIAGNOSIS — N18.31 STAGE 3A CHRONIC KIDNEY DISEASE (HCC): ICD-10-CM

## 2023-06-28 PROCEDURE — 76770 US EXAM ABDO BACK WALL COMP: CPT

## 2023-06-29 ENCOUNTER — TELEPHONE (OUTPATIENT)
Dept: NEUROLOGY | Age: 58
End: 2023-06-29

## 2023-07-05 RX ORDER — CITALOPRAM 10 MG/1
10 TABLET ORAL DAILY
Qty: 30 TABLET | Refills: 0 | Status: SHIPPED | OUTPATIENT
Start: 2023-07-05

## 2023-07-11 ENCOUNTER — OFFICE VISIT (OUTPATIENT)
Dept: INTERNAL MEDICINE CLINIC | Age: 58
End: 2023-07-11
Payer: COMMERCIAL

## 2023-07-11 VITALS
WEIGHT: 163 LBS | BODY MASS INDEX: 27.16 KG/M2 | DIASTOLIC BLOOD PRESSURE: 70 MMHG | HEIGHT: 65 IN | SYSTOLIC BLOOD PRESSURE: 145 MMHG | HEART RATE: 68 BPM

## 2023-07-11 DIAGNOSIS — F11.20 SEVERE OPIOID USE DISORDER (HCC): Primary | ICD-10-CM

## 2023-07-11 DIAGNOSIS — F41.9 ANXIETY AND DEPRESSION: ICD-10-CM

## 2023-07-11 DIAGNOSIS — R11.0 NAUSEA: ICD-10-CM

## 2023-07-11 DIAGNOSIS — I10 HYPERTENSION, UNSPECIFIED TYPE: ICD-10-CM

## 2023-07-11 DIAGNOSIS — F32.A ANXIETY AND DEPRESSION: ICD-10-CM

## 2023-07-11 DIAGNOSIS — G89.4 CHRONIC PAIN SYNDROME: ICD-10-CM

## 2023-07-11 PROCEDURE — G8428 CUR MEDS NOT DOCUMENT: HCPCS | Performed by: NURSE PRACTITIONER

## 2023-07-11 PROCEDURE — 99214 OFFICE O/P EST MOD 30 MIN: CPT | Performed by: NURSE PRACTITIONER

## 2023-07-11 PROCEDURE — 1036F TOBACCO NON-USER: CPT | Performed by: NURSE PRACTITIONER

## 2023-07-11 PROCEDURE — 3077F SYST BP >= 140 MM HG: CPT | Performed by: NURSE PRACTITIONER

## 2023-07-11 PROCEDURE — 3078F DIAST BP <80 MM HG: CPT | Performed by: NURSE PRACTITIONER

## 2023-07-11 PROCEDURE — 80305 DRUG TEST PRSMV DIR OPT OBS: CPT | Performed by: NURSE PRACTITIONER

## 2023-07-11 PROCEDURE — 3017F COLORECTAL CA SCREEN DOC REV: CPT | Performed by: NURSE PRACTITIONER

## 2023-07-11 PROCEDURE — G8419 CALC BMI OUT NRM PARAM NOF/U: HCPCS | Performed by: NURSE PRACTITIONER

## 2023-07-11 RX ORDER — BUPRENORPHINE HYDROCHLORIDE AND NALOXONE HYDROCHLORIDE DIHYDRATE 8; 2 MG/1; MG/1
1 TABLET SUBLINGUAL 2 TIMES DAILY
Qty: 56 TABLET | Refills: 0 | Status: SHIPPED | OUTPATIENT
Start: 2023-07-11 | End: 2023-08-08

## 2023-07-11 RX ORDER — GABAPENTIN 300 MG/1
300 CAPSULE ORAL 3 TIMES DAILY
Qty: 90 CAPSULE | Refills: 1 | Status: CANCELLED | OUTPATIENT
Start: 2023-07-11 | End: 2023-09-09

## 2023-07-11 RX ORDER — OLANZAPINE 5 MG/1
5 TABLET ORAL NIGHTLY
Qty: 30 TABLET | Refills: 0 | Status: SHIPPED | OUTPATIENT
Start: 2023-07-11

## 2023-07-11 RX ORDER — LEVOTHYROXINE SODIUM 0.05 MG/1
50 TABLET ORAL DAILY
Qty: 90 TABLET | Refills: 1 | Status: SHIPPED | OUTPATIENT
Start: 2023-07-11

## 2023-07-11 RX ORDER — ONDANSETRON 4 MG/1
TABLET, FILM COATED ORAL
Qty: 30 TABLET | Refills: 0 | Status: SHIPPED | OUTPATIENT
Start: 2023-07-11

## 2023-07-11 RX ORDER — GABAPENTIN 600 MG/1
600 TABLET ORAL 3 TIMES DAILY
Qty: 90 TABLET | Refills: 0 | Status: SHIPPED | OUTPATIENT
Start: 2023-07-11 | End: 2023-08-10

## 2023-07-11 RX ORDER — TRIAMTERENE AND HYDROCHLOROTHIAZIDE 75; 50 MG/1; MG/1
1 TABLET ORAL DAILY
Qty: 30 TABLET | Refills: 1 | Status: SHIPPED | OUTPATIENT
Start: 2023-07-11

## 2023-07-11 ASSESSMENT — ENCOUNTER SYMPTOMS
CONSTIPATION: 0
PHOTOPHOBIA: 0
RHINORRHEA: 0
NAUSEA: 1
SINUS PRESSURE: 0
VOMITING: 1
SHORTNESS OF BREATH: 1
SORE THROAT: 0
WHEEZING: 0
ABDOMINAL PAIN: 0
SINUS PAIN: 0
TROUBLE SWALLOWING: 0
BACK PAIN: 1
BLOOD IN STOOL: 0
ABDOMINAL DISTENTION: 0
DIARRHEA: 0
COUGH: 0

## 2023-07-11 NOTE — PROGRESS NOTES
[address] INTERNAL MEDICINE [facility] MEDICATION MANAGEMENT  [address] [facility]  [address] [facility] [place] [id number]  Dept: [phone]  Dept Fax: [phone]: [phone]     Visit Date:  7/11/2023    Patient:  Live Wood  YOB: 1965    HPI:     Chief Complaint   Patient presents with    Drug Problem     Abimbola Steel presents today for medical evaluation of severe opioid use disorder, anxiety/depression, weight loss, COPD, tobacco abuse, chronic pain, liver lesion, hypothyroidism, CKD, nausea/vomiting     I last seen her 5 weeks ago. TSH 18.040 on 6/1/23 and 87.450 on 4/27/23      Echocardiogram 12/28/2021:      Ejection fraction is visually estimated at 50%. There was mild global hypokinesis of the left ventricle. Aortic valve appears tricuspid. Aortic valve leaflets are somewhat thickened. CT scheduled after last visit, as there was a lesion noted on the liver on previous CT of the chest. Cancelled EGD. She also cancelled her CT and pulmonary follow up. Did have her ultrasound of the liver completed. CT Chest Without Contrast 1/5/23  Impression       The area of linear and irregular consolidation in the left lower lobe posteriorly has partially resolved. There continues to be patchy reticular nodular groundglass opacity in the left lower lobe posteriorly, possibly infectious/inflammatory. A 3 month    follow-up noncontrast CT thorax is advised to ensure continued improvement/resolution. CT Chest Without Contrast 4/14/23     The small tree-in-bud opacities in the left lower lobe are less numerous and less prominent. The patchy left lower lobe groundglass opacity has resolved. No new pulmonary mass or nodule is observed. No acute intrathoracic process is visualized. Centrilobular emphysema and chronic findings are discussed. Liver US 1/5/23  Impression   1. Cholelithiasis without evidence of acute cholecystitis.    2.

## 2023-07-11 NOTE — TELEPHONE ENCOUNTER
Last visit- 7/11/2023  Next visit- 8/8/2023    Requested Prescriptions     Pending Prescriptions Disp Refills    buprenorphine er (SUBLOCADE) 300 MG/1.5ML SOSY injection 1 each 1     Sig: Inject 1.5 mLs into the skin every 30 days for 30 days.  Max Daily Amount: 300 mg

## 2023-08-07 RX ORDER — CITALOPRAM HYDROBROMIDE 10 MG/1
10 TABLET ORAL DAILY
Qty: 30 TABLET | Refills: 0 | Status: SHIPPED | OUTPATIENT
Start: 2023-08-07

## 2023-08-08 DIAGNOSIS — F11.20 SEVERE OPIOID USE DISORDER (HCC): Primary | ICD-10-CM

## 2023-08-08 NOTE — TELEPHONE ENCOUNTER
Last visit- 7/11/2023  Next visit- Visit date not found    Requested Prescriptions     Pending Prescriptions Disp Refills    buprenorphine er (SUBLOCADE) 300 MG/1.5ML SOSY injection 1.5 mL 1     Sig: Inject 1.5 mLs into the skin every 28 days for 28 days.  Max Daily Amount: 300 mg

## 2023-08-28 ENCOUNTER — TELEPHONE (OUTPATIENT)
Dept: INTERNAL MEDICINE CLINIC | Age: 58
End: 2023-08-28

## 2023-08-28 RX ORDER — GABAPENTIN 600 MG/1
600 TABLET ORAL 3 TIMES DAILY
Qty: 90 TABLET | Refills: 0 | Status: SHIPPED | OUTPATIENT
Start: 2023-08-28 | End: 2023-09-01 | Stop reason: SDUPTHER

## 2023-08-28 NOTE — TELEPHONE ENCOUNTER
The patient called and asked if you can sign her Gabapentin it did not get refilled on 8/11/23 when it was due.

## 2023-08-28 NOTE — TELEPHONE ENCOUNTER
Spoke with [facility] to get patient sublocade ship they went to process the injection and patient insurance has changed.      Called pt and left a message for her to call back in regards to insurance

## 2023-09-01 ENCOUNTER — OFFICE VISIT (OUTPATIENT)
Dept: INTERNAL MEDICINE CLINIC | Age: 58
End: 2023-09-01
Payer: COMMERCIAL

## 2023-09-01 VITALS
HEART RATE: 67 BPM | DIASTOLIC BLOOD PRESSURE: 72 MMHG | BODY MASS INDEX: 27.66 KG/M2 | SYSTOLIC BLOOD PRESSURE: 140 MMHG | HEIGHT: 65 IN | WEIGHT: 166 LBS | RESPIRATION RATE: 18 BRPM

## 2023-09-01 DIAGNOSIS — F11.20 SEVERE OPIOID USE DISORDER (HCC): Primary | ICD-10-CM

## 2023-09-01 DIAGNOSIS — E03.9 HYPOTHYROIDISM, UNSPECIFIED TYPE: ICD-10-CM

## 2023-09-01 DIAGNOSIS — I10 HYPERTENSION, UNSPECIFIED TYPE: ICD-10-CM

## 2023-09-01 DIAGNOSIS — G89.4 CHRONIC PAIN SYNDROME: ICD-10-CM

## 2023-09-01 DIAGNOSIS — M54.31 SCIATICA OF RIGHT SIDE: ICD-10-CM

## 2023-09-01 DIAGNOSIS — F41.9 ANXIETY AND DEPRESSION: ICD-10-CM

## 2023-09-01 DIAGNOSIS — F32.A ANXIETY AND DEPRESSION: ICD-10-CM

## 2023-09-01 LAB
ALBUMIN SERPL BCG-MCNC: 4.1 G/DL (ref 3.5–5.1)
ALCOHOL URINE: ABNORMAL
ALP SERPL-CCNC: 103 U/L (ref 38–126)
ALT SERPL W/O P-5'-P-CCNC: 15 U/L (ref 11–66)
AMPHETAMINE SCREEN, URINE: ABNORMAL
ANION GAP SERPL CALC-SCNC: 11 MEQ/L (ref 8–16)
AST SERPL-CCNC: 20 U/L (ref 5–40)
BARBITURATE SCREEN, URINE: ABNORMAL
BASOPHILS ABSOLUTE: 0.1 THOU/MM3 (ref 0–0.1)
BASOPHILS NFR BLD AUTO: 0.6 %
BENZODIAZEPINE SCREEN, URINE: ABNORMAL
BILIRUB SERPL-MCNC: < 0.2 MG/DL (ref 0.3–1.2)
BUN SERPL-MCNC: 29 MG/DL (ref 7–22)
BUPRENORPHINE URINE: ABNORMAL
CALCIUM SERPL-MCNC: 10 MG/DL (ref 8.5–10.5)
CHLORIDE SERPL-SCNC: 100 MEQ/L (ref 98–111)
CO2 SERPL-SCNC: 30 MEQ/L (ref 23–33)
COCAINE METABOLITE SCREEN URINE: ABNORMAL
CREAT SERPL-MCNC: 1.2 MG/DL (ref 0.4–1.2)
DEPRECATED RDW RBC AUTO: 46.9 FL (ref 35–45)
EOSINOPHIL NFR BLD AUTO: 0.9 %
EOSINOPHILS ABSOLUTE: 0.1 THOU/MM3 (ref 0–0.4)
ERYTHROCYTE [DISTWIDTH] IN BLOOD BY AUTOMATED COUNT: 13.2 % (ref 11.5–14.5)
FENTANYL SCREEN, URINE: ABNORMAL
GABAPENTIN SCREEN, URINE: ABNORMAL
GFR SERPL CREATININE-BSD FRML MDRD: 52 ML/MIN/1.73M2
GLUCOSE SERPL-MCNC: 111 MG/DL (ref 70–108)
HCT VFR BLD AUTO: 44.1 % (ref 37–47)
HGB BLD-MCNC: 14.2 GM/DL (ref 12–16)
IMM GRANULOCYTES # BLD AUTO: 0.07 THOU/MM3 (ref 0–0.07)
IMM GRANULOCYTES NFR BLD AUTO: 0.6 %
LYMPHOCYTES ABSOLUTE: 2.3 THOU/MM3 (ref 1–4.8)
LYMPHOCYTES NFR BLD AUTO: 20.9 %
MCH RBC QN AUTO: 31.1 PG (ref 26–33)
MCHC RBC AUTO-ENTMCNC: 32.2 GM/DL (ref 32.2–35.5)
MCV RBC AUTO: 96.5 FL (ref 81–99)
MDMA URINE: ABNORMAL
METHADONE SCREEN, URINE: ABNORMAL
METHAMPHETAMINE, URINE: ABNORMAL
MONOCYTES ABSOLUTE: 0.6 THOU/MM3 (ref 0.4–1.3)
MONOCYTES NFR BLD AUTO: 5.9 %
NEUTROPHILS NFR BLD AUTO: 71.1 %
NRBC BLD AUTO-RTO: 0 /100 WBC
OPIATE SCREEN URINE: ABNORMAL
OXYCODONE SCREEN URINE: ABNORMAL
PHENCYCLIDINE SCREEN URINE: ABNORMAL
PLATELET # BLD AUTO: 334 THOU/MM3 (ref 130–400)
PMV BLD AUTO: 9.2 FL (ref 9.4–12.4)
POTASSIUM SERPL-SCNC: 3.4 MEQ/L (ref 3.5–5.2)
PROPOXYPHENE SCREEN, URINE: ABNORMAL
PROT SERPL-MCNC: 7.1 G/DL (ref 6.1–8)
RBC # BLD AUTO: 4.57 MILL/MM3 (ref 4.2–5.4)
SEGMENTED NEUTROPHILS ABSOLUTE COUNT: 7.7 THOU/MM3 (ref 1.8–7.7)
SODIUM SERPL-SCNC: 141 MEQ/L (ref 135–145)
SYNTHETIC CANNABINOIDS(K2) SCREEN, URINE: ABNORMAL
T4 FREE SERPL-MCNC: 1.03 NG/DL (ref 0.93–1.76)
THC SCREEN, URINE: ABNORMAL
TRAMADOL SCREEN URINE: ABNORMAL
TRICYCLIC ANTIDEPRESSANTS, UR: ABNORMAL
TSH SERPL DL<=0.005 MIU/L-ACNC: 11.72 UIU/ML (ref 0.4–4.2)
WBC # BLD AUTO: 10.9 THOU/MM3 (ref 4.8–10.8)

## 2023-09-01 PROCEDURE — G8427 DOCREV CUR MEDS BY ELIG CLIN: HCPCS | Performed by: NURSE PRACTITIONER

## 2023-09-01 PROCEDURE — 3017F COLORECTAL CA SCREEN DOC REV: CPT | Performed by: NURSE PRACTITIONER

## 2023-09-01 PROCEDURE — 3078F DIAST BP <80 MM HG: CPT | Performed by: NURSE PRACTITIONER

## 2023-09-01 PROCEDURE — 1036F TOBACCO NON-USER: CPT | Performed by: NURSE PRACTITIONER

## 2023-09-01 PROCEDURE — 99214 OFFICE O/P EST MOD 30 MIN: CPT | Performed by: NURSE PRACTITIONER

## 2023-09-01 PROCEDURE — G8419 CALC BMI OUT NRM PARAM NOF/U: HCPCS | Performed by: NURSE PRACTITIONER

## 2023-09-01 PROCEDURE — 3074F SYST BP LT 130 MM HG: CPT | Performed by: NURSE PRACTITIONER

## 2023-09-01 PROCEDURE — 80305 DRUG TEST PRSMV DIR OPT OBS: CPT | Performed by: NURSE PRACTITIONER

## 2023-09-01 RX ORDER — TRIAMTERENE AND HYDROCHLOROTHIAZIDE 75; 50 MG/1; MG/1
1 TABLET ORAL DAILY
Qty: 30 TABLET | Refills: 1 | Status: SHIPPED | OUTPATIENT
Start: 2023-09-01

## 2023-09-01 RX ORDER — OLANZAPINE 5 MG/1
5 TABLET ORAL NIGHTLY
Qty: 30 TABLET | Refills: 0 | Status: SHIPPED | OUTPATIENT
Start: 2023-09-01

## 2023-09-01 RX ORDER — GABAPENTIN 600 MG/1
600 TABLET ORAL 3 TIMES DAILY
Qty: 90 TABLET | Refills: 0 | Status: SHIPPED | OUTPATIENT
Start: 2023-09-01 | End: 2023-10-01

## 2023-09-01 RX ORDER — METHYLPREDNISOLONE 4 MG/1
TABLET ORAL
Qty: 1 KIT | Refills: 0 | Status: SHIPPED | OUTPATIENT
Start: 2023-09-01 | End: 2023-09-07

## 2023-09-01 RX ORDER — FLUOXETINE HYDROCHLORIDE 40 MG/1
40 CAPSULE ORAL DAILY
Qty: 30 CAPSULE | Refills: 1 | Status: SHIPPED | OUTPATIENT
Start: 2023-09-01

## 2023-09-01 RX ORDER — CITALOPRAM HYDROBROMIDE 10 MG/1
10 TABLET ORAL DAILY
Qty: 30 TABLET | Refills: 0 | Status: SHIPPED | OUTPATIENT
Start: 2023-09-01

## 2023-09-01 NOTE — PROGRESS NOTES
Patient stated she has not consumed any Fentanyl or opiates in the last 2 weeks. Patient was educated on the risk of precipitated withdrawals symptoms that could take place with receiving the injections. Patient verbalized understanding. Consent signed at this time. Sublocade 300mg SQ injection given to RLQ. Patient tolerated well. No adverse reaction noticed.

## 2023-09-01 NOTE — PROGRESS NOTES
[address] INTERNAL MEDICINE AND MEDICATION MANAGEMENT  [address] [facility]  [address] [place] [address]  Dept: [phone]  Dept Fax: [phone]: [phone]     Visit Date:  9/1/2023    Patient:  Yesica Carter  YOB: 1965    HPI:     Chief Complaint   Patient presents with    Drug Problem     Leland presents today for medical evaluation of severe opioid use disorder, anxiety/depression, weight loss, COPD, tobacco abuse, chronic pain, liver lesion, hypothyroidism, CKD, nausea/vomiting, sciaticaq     I last seen her 7 weeks ago. Cut her suboxone in half- was forgetting to take it in the evening. Right sciatica-      TSH 18.040 on 6/1/23 and 87.450 on 4/27/23. Taking synthroid 75 mcg daily. Has not repeated TSH. Kidney US 6/28  1. Normal bilateral renal ultrasound. 2. Small amount of postvoid residual urine but otherwise unremarkable urinary bladder. Echocardiogram 12/28/2021:      Ejection fraction is visually estimated at 50%. There was mild global hypokinesis of the left ventricle. Aortic valve appears tricuspid. Aortic valve leaflets are somewhat thickened. CT scheduled after last visit, as there was a lesion noted on the liver on previous CT of the chest. Cancelled EGD. She also cancelled her CT and pulmonary follow up. Did have her ultrasound of the liver completed. CT Chest Without Contrast 1/5/23  Impression       The area of linear and irregular consolidation in the left lower lobe posteriorly has partially resolved. There continues to be patchy reticular nodular groundglass opacity in the left lower lobe posteriorly, possibly infectious/inflammatory. A 3 month    follow-up noncontrast CT thorax is advised to ensure continued improvement/resolution. CT Chest Without Contrast 4/14/23     The small tree-in-bud opacities in the left lower lobe are less numerous and less prominent.  The patchy left

## 2023-09-06 DIAGNOSIS — E03.9 HYPOTHYROIDISM, UNSPECIFIED TYPE: Primary | ICD-10-CM

## 2023-09-06 RX ORDER — LEVOTHYROXINE SODIUM 0.07 MG/1
75 TABLET ORAL DAILY
Qty: 30 TABLET | Refills: 1 | Status: SHIPPED | OUTPATIENT
Start: 2023-09-06

## 2023-09-10 ASSESSMENT — ENCOUNTER SYMPTOMS
WHEEZING: 0
COUGH: 0
SHORTNESS OF BREATH: 1
TROUBLE SWALLOWING: 0
VOMITING: 1
SINUS PAIN: 0
ABDOMINAL PAIN: 0
NAUSEA: 1
DIARRHEA: 0
CONSTIPATION: 0
BACK PAIN: 1
ABDOMINAL DISTENTION: 0
RHINORRHEA: 0
PHOTOPHOBIA: 0
BLOOD IN STOOL: 0
SINUS PRESSURE: 0
SORE THROAT: 0

## 2023-09-29 ENCOUNTER — OFFICE VISIT (OUTPATIENT)
Dept: INTERNAL MEDICINE CLINIC | Age: 58
End: 2023-09-29
Payer: COMMERCIAL

## 2023-09-29 VITALS
HEIGHT: 65 IN | WEIGHT: 165 LBS | BODY MASS INDEX: 27.49 KG/M2 | DIASTOLIC BLOOD PRESSURE: 88 MMHG | HEART RATE: 75 BPM | RESPIRATION RATE: 18 BRPM | SYSTOLIC BLOOD PRESSURE: 137 MMHG

## 2023-09-29 DIAGNOSIS — F41.9 ANXIETY AND DEPRESSION: ICD-10-CM

## 2023-09-29 DIAGNOSIS — G89.4 CHRONIC PAIN SYNDROME: ICD-10-CM

## 2023-09-29 DIAGNOSIS — R11.0 NAUSEA: ICD-10-CM

## 2023-09-29 DIAGNOSIS — F11.20 SEVERE OPIOID USE DISORDER (HCC): Primary | ICD-10-CM

## 2023-09-29 DIAGNOSIS — F32.A ANXIETY AND DEPRESSION: ICD-10-CM

## 2023-09-29 PROCEDURE — 3017F COLORECTAL CA SCREEN DOC REV: CPT | Performed by: NURSE PRACTITIONER

## 2023-09-29 PROCEDURE — G8428 CUR MEDS NOT DOCUMENT: HCPCS | Performed by: NURSE PRACTITIONER

## 2023-09-29 PROCEDURE — 96372 THER/PROPH/DIAG INJ SC/IM: CPT | Performed by: NURSE PRACTITIONER

## 2023-09-29 PROCEDURE — 80305 DRUG TEST PRSMV DIR OPT OBS: CPT | Performed by: NURSE PRACTITIONER

## 2023-09-29 PROCEDURE — G8419 CALC BMI OUT NRM PARAM NOF/U: HCPCS | Performed by: NURSE PRACTITIONER

## 2023-09-29 PROCEDURE — 1036F TOBACCO NON-USER: CPT | Performed by: NURSE PRACTITIONER

## 2023-09-29 PROCEDURE — 99214 OFFICE O/P EST MOD 30 MIN: CPT | Performed by: NURSE PRACTITIONER

## 2023-09-29 RX ORDER — ONDANSETRON 4 MG/1
TABLET, FILM COATED ORAL
Qty: 30 TABLET | Refills: 0 | Status: SHIPPED | OUTPATIENT
Start: 2023-09-29

## 2023-09-29 RX ORDER — GABAPENTIN 600 MG/1
600 TABLET ORAL 3 TIMES DAILY
Qty: 90 TABLET | Refills: 0 | Status: SHIPPED | OUTPATIENT
Start: 2023-09-29 | End: 2023-10-29

## 2023-09-29 RX ORDER — CITALOPRAM HYDROBROMIDE 10 MG/1
10 TABLET ORAL DAILY
Qty: 30 TABLET | Refills: 0 | Status: SHIPPED | OUTPATIENT
Start: 2023-09-29

## 2023-09-29 RX ORDER — OLANZAPINE 5 MG/1
5 TABLET ORAL NIGHTLY
Qty: 30 TABLET | Refills: 0 | Status: SHIPPED | OUTPATIENT
Start: 2023-09-29

## 2023-09-29 RX ORDER — FLUOXETINE HYDROCHLORIDE 40 MG/1
40 CAPSULE ORAL DAILY
Qty: 30 CAPSULE | Refills: 1 | Status: SHIPPED | OUTPATIENT
Start: 2023-09-29

## 2023-09-29 NOTE — PROGRESS NOTES
Patient stated she has not consumed any Fentanyl or opiates in the last 2 weeks. Patient was educated on the risk of precipitated withdrawals symptoms that could take place with receiving the injections. Patient verbalized understanding. Sublocade 300mg SQ injection given to LLQ. Patient tolerated well. No adverse reaction noticed.
OLANZapine (ZYPREXA) 5 MG tablet; Take 1 tablet by mouth nightly at bedtime. Dispense: 30 tablet; Refill: 0    2. Chronic pain syndrome    - gabapentin (NEURONTIN) 600 MG tablet; Take 1 tablet by mouth 3 times daily for 30 days. Dispense: 90 tablet; Refill: 0    3. Nausea    - ondansetron (ZOFRAN) 4 MG tablet; Take 1 tab prn for nausea  Dispense: 30 tablet; Refill: 0    4. Severe opioid use disorder (HCC)    - POCT Rapid Drug Screen  - Narcan at home  - Labs not yet obtained  - OARRS reviewed, no discrepancies  - Encouraged to attend NA/AA meetings and/or arrange for individualized counseling  - sublocade given by nurse- tolerated well      Return in about 4 weeks (around 10/27/2023). Patient given educational materials - see patient instructions. Discussed use, benefit, and side effects of prescribed medications. All patient questions answered. Pt voiced understanding. Reviewed health maintenance.        Electronically signed ENMANUEL Goel - CNP on 9/29/23 at 11:26 AM EDT

## 2023-10-02 ENCOUNTER — TELEPHONE (OUTPATIENT)
Dept: INTERNAL MEDICINE CLINIC | Age: 58
End: 2023-10-02

## 2023-10-02 NOTE — TELEPHONE ENCOUNTER
The pt called and left a voice mail that her sciatic pain is acting up and was wondering if you would send her some steroids?

## 2023-10-03 RX ORDER — METHYLPREDNISOLONE 4 MG/1
TABLET ORAL
Qty: 1 KIT | Refills: 0 | Status: SHIPPED | OUTPATIENT
Start: 2023-10-03 | End: 2023-10-09

## 2023-10-09 ASSESSMENT — ENCOUNTER SYMPTOMS
ABDOMINAL PAIN: 0
ABDOMINAL DISTENTION: 0
BACK PAIN: 1
RHINORRHEA: 0
COUGH: 0
NAUSEA: 1
SINUS PAIN: 0
VOMITING: 1
SORE THROAT: 0
BLOOD IN STOOL: 0
WHEEZING: 0
DIARRHEA: 0
SHORTNESS OF BREATH: 1
PHOTOPHOBIA: 0
SINUS PRESSURE: 0
CONSTIPATION: 0
TROUBLE SWALLOWING: 0

## 2023-10-27 ENCOUNTER — OFFICE VISIT (OUTPATIENT)
Dept: INTERNAL MEDICINE CLINIC | Age: 58
End: 2023-10-27

## 2023-10-27 VITALS
SYSTOLIC BLOOD PRESSURE: 129 MMHG | DIASTOLIC BLOOD PRESSURE: 65 MMHG | RESPIRATION RATE: 18 BRPM | BODY MASS INDEX: 27.82 KG/M2 | HEART RATE: 68 BPM | WEIGHT: 167 LBS | HEIGHT: 65 IN

## 2023-10-27 DIAGNOSIS — F32.A ANXIETY AND DEPRESSION: ICD-10-CM

## 2023-10-27 DIAGNOSIS — F41.9 ANXIETY AND DEPRESSION: ICD-10-CM

## 2023-10-27 DIAGNOSIS — E03.9 HYPOTHYROIDISM, UNSPECIFIED TYPE: ICD-10-CM

## 2023-10-27 DIAGNOSIS — M54.41 ACUTE MIDLINE LOW BACK PAIN WITH RIGHT-SIDED SCIATICA: ICD-10-CM

## 2023-10-27 DIAGNOSIS — I10 HYPERTENSION, UNSPECIFIED TYPE: ICD-10-CM

## 2023-10-27 DIAGNOSIS — G89.4 CHRONIC PAIN SYNDROME: ICD-10-CM

## 2023-10-27 DIAGNOSIS — F11.20 SEVERE OPIOID USE DISORDER (HCC): Primary | ICD-10-CM

## 2023-10-27 LAB
ALBUMIN SERPL BCG-MCNC: 4.1 G/DL (ref 3.5–5.1)
ALCOHOL URINE: ABNORMAL
ALP SERPL-CCNC: 98 U/L (ref 38–126)
ALT SERPL W/O P-5'-P-CCNC: 13 U/L (ref 11–66)
AMPHETAMINE SCREEN, URINE: ABNORMAL
ANION GAP SERPL CALC-SCNC: 12 MEQ/L (ref 8–16)
AST SERPL-CCNC: 16 U/L (ref 5–40)
BARBITURATE SCREEN, URINE: ABNORMAL
BENZODIAZEPINE SCREEN, URINE: ABNORMAL
BILIRUB SERPL-MCNC: 0.2 MG/DL (ref 0.3–1.2)
BUN SERPL-MCNC: 35 MG/DL (ref 7–22)
BUPRENORPHINE URINE: ABNORMAL
CALCIUM SERPL-MCNC: 9.9 MG/DL (ref 8.5–10.5)
CHLORIDE SERPL-SCNC: 102 MEQ/L (ref 98–111)
CO2 SERPL-SCNC: 29 MEQ/L (ref 23–33)
COCAINE METABOLITE SCREEN URINE: ABNORMAL
CREAT SERPL-MCNC: 1.4 MG/DL (ref 0.4–1.2)
DEPRECATED RDW RBC AUTO: 49.6 FL (ref 35–45)
ERYTHROCYTE [DISTWIDTH] IN BLOOD BY AUTOMATED COUNT: 14.1 % (ref 11.5–14.5)
FENTANYL SCREEN, URINE: ABNORMAL
GABAPENTIN SCREEN, URINE: ABNORMAL
GFR SERPL CREATININE-BSD FRML MDRD: 43 ML/MIN/1.73M2
GLUCOSE SERPL-MCNC: 87 MG/DL (ref 70–108)
HCT VFR BLD AUTO: 43.8 % (ref 37–47)
HGB BLD-MCNC: 14.1 GM/DL (ref 12–16)
MCH RBC QN AUTO: 30.9 PG (ref 26–33)
MCHC RBC AUTO-ENTMCNC: 32.2 GM/DL (ref 32.2–35.5)
MCV RBC AUTO: 96.1 FL (ref 81–99)
MDMA URINE: ABNORMAL
METHADONE SCREEN, URINE: ABNORMAL
METHAMPHETAMINE, URINE: ABNORMAL
OPIATE SCREEN URINE: ABNORMAL
OXYCODONE SCREEN URINE: ABNORMAL
PHENCYCLIDINE SCREEN URINE: ABNORMAL
PLATELET # BLD AUTO: 317 THOU/MM3 (ref 130–400)
PMV BLD AUTO: 8.7 FL (ref 9.4–12.4)
POTASSIUM SERPL-SCNC: 3.9 MEQ/L (ref 3.5–5.2)
PROPOXYPHENE SCREEN, URINE: ABNORMAL
PROT SERPL-MCNC: 7.4 G/DL (ref 6.1–8)
RBC # BLD AUTO: 4.56 MILL/MM3 (ref 4.2–5.4)
SODIUM SERPL-SCNC: 143 MEQ/L (ref 135–145)
SYNTHETIC CANNABINOIDS(K2) SCREEN, URINE: ABNORMAL
THC SCREEN, URINE: ABNORMAL
TRAMADOL SCREEN URINE: ABNORMAL
TRICYCLIC ANTIDEPRESSANTS, UR: ABNORMAL
TSH SERPL DL<=0.005 MIU/L-ACNC: 1.49 UIU/ML (ref 0.4–4.2)
WBC # BLD AUTO: 12.2 THOU/MM3 (ref 4.8–10.8)

## 2023-10-27 RX ORDER — TRIAMTERENE AND HYDROCHLOROTHIAZIDE 75; 50 MG/1; MG/1
1 TABLET ORAL DAILY
Qty: 30 TABLET | Refills: 1 | Status: SHIPPED | OUTPATIENT
Start: 2023-10-27

## 2023-10-27 RX ORDER — CITALOPRAM HYDROBROMIDE 10 MG/1
10 TABLET ORAL DAILY
Qty: 30 TABLET | Refills: 0 | Status: SHIPPED | OUTPATIENT
Start: 2023-10-27

## 2023-10-27 RX ORDER — FLUOXETINE HYDROCHLORIDE 40 MG/1
40 CAPSULE ORAL DAILY
Qty: 30 CAPSULE | Refills: 1 | Status: SHIPPED | OUTPATIENT
Start: 2023-10-27

## 2023-10-27 RX ORDER — OLANZAPINE 5 MG/1
5 TABLET ORAL NIGHTLY
Qty: 30 TABLET | Refills: 0 | Status: SHIPPED | OUTPATIENT
Start: 2023-10-27

## 2023-10-27 RX ORDER — MELOXICAM 7.5 MG/1
7.5 TABLET ORAL 2 TIMES DAILY PRN
Qty: 30 TABLET | Refills: 0 | Status: SHIPPED | OUTPATIENT
Start: 2023-10-27

## 2023-10-27 RX ORDER — PREDNISONE 20 MG/1
20 TABLET ORAL DAILY
Qty: 7 TABLET | Refills: 0 | Status: SHIPPED | OUTPATIENT
Start: 2023-10-27 | End: 2023-11-03

## 2023-10-27 RX ORDER — GABAPENTIN 600 MG/1
600 TABLET ORAL 4 TIMES DAILY
Qty: 120 TABLET | Refills: 0 | Status: SHIPPED | OUTPATIENT
Start: 2023-10-27 | End: 2023-11-26

## 2023-10-27 NOTE — PROGRESS NOTES
[address] INTERNAL MEDICINE AND MEDICATION MANAGEMENT  [address] [facility]  [address] [place] [address]  Dept: [phone]  Dept Fax: [phone]: [phone]     Visit Date:  10/27/2023    Patient:  Dayanna Powell  YOB: 1965    HPI:     Chief Complaint   Patient presents with    Drug Problem     Katty Martinez presents today for medical evaluation of severe opioid use disorder, anxiety/depression, weight loss, COPD, tobacco abuse, chronic pain, liver lesion, hypothyroidism, CKD, nausea/vomiting, sciatica     I last seen her 4 weeks ago. Bilateral lower back pain with right sciatica. Improved with medrol, but has returned    Xray Lumbar Spine 10/6/21:    1. No acute bony abnormality. 2. Mild lumbar spondylosis. 3. Dextroscoliosis. TSH 18.040 on 6/1/23 and 87.450 on 4/27/23. Taking synthroid 75 mcg daily. Has not repeated TSH. Kidney US 6/28  1. Normal bilateral renal ultrasound. 2. Small amount of postvoid residual urine but otherwise unremarkable urinary bladder. Echocardiogram 12/28/2021:      Ejection fraction is visually estimated at 50%. There was mild global hypokinesis of the left ventricle. Aortic valve appears tricuspid. Aortic valve leaflets are somewhat thickened. CT scheduled after last visit, as there was a lesion noted on the liver on previous CT of the chest. Cancelled EGD. She also cancelled her CT and pulmonary follow up. Did have her ultrasound of the liver completed. CT Chest Without Contrast 1/5/23  Impression       The area of linear and irregular consolidation in the left lower lobe posteriorly has partially resolved. There continues to be patchy reticular nodular groundglass opacity in the left lower lobe posteriorly, possibly infectious/inflammatory. A 3 month    follow-up noncontrast CT thorax is advised to ensure continued improvement/resolution.       CT Chest Without Contrast 4/14/23

## 2023-10-27 NOTE — PROGRESS NOTES
Patient stated she has not consumed any Fentanyl or opiates in the last 2 weeks. Patient was educated on the risk of precipitated withdrawals symptoms that could take place with receiving the injections. Patient verbalized understanding. Sublocade 100mg SQ injection given to LUQ. Patient tolerated well. No adverse reaction noticed.

## 2023-11-10 DIAGNOSIS — E03.9 HYPOTHYROIDISM, UNSPECIFIED TYPE: ICD-10-CM

## 2023-11-10 RX ORDER — LEVOTHYROXINE SODIUM 0.07 MG/1
75 TABLET ORAL DAILY
Qty: 30 TABLET | Refills: 1 | Status: SHIPPED | OUTPATIENT
Start: 2023-11-10

## 2023-11-10 ASSESSMENT — ENCOUNTER SYMPTOMS
DIARRHEA: 0
PHOTOPHOBIA: 0
SORE THROAT: 0
WHEEZING: 0
BLOOD IN STOOL: 0
SINUS PRESSURE: 0
BACK PAIN: 1
ABDOMINAL PAIN: 0
COUGH: 0
SHORTNESS OF BREATH: 1
RHINORRHEA: 0
CONSTIPATION: 0
TROUBLE SWALLOWING: 0
ABDOMINAL DISTENTION: 0
SINUS PAIN: 0
NAUSEA: 1
VOMITING: 1

## 2023-12-04 DIAGNOSIS — G89.4 CHRONIC PAIN SYNDROME: ICD-10-CM

## 2023-12-04 RX ORDER — GABAPENTIN 600 MG/1
600 TABLET ORAL 4 TIMES DAILY
Qty: 120 TABLET | Refills: 0 | Status: SHIPPED | OUTPATIENT
Start: 2023-12-04 | End: 2024-01-03

## 2023-12-08 DIAGNOSIS — F41.9 ANXIETY AND DEPRESSION: ICD-10-CM

## 2023-12-08 DIAGNOSIS — F32.A ANXIETY AND DEPRESSION: ICD-10-CM

## 2023-12-08 RX ORDER — CITALOPRAM HYDROBROMIDE 10 MG/1
10 TABLET ORAL DAILY
Qty: 30 TABLET | Refills: 0 | Status: SHIPPED | OUTPATIENT
Start: 2023-12-08

## 2024-01-04 ENCOUNTER — NURSE ONLY (OUTPATIENT)
Dept: INTERNAL MEDICINE CLINIC | Age: 59
End: 2024-01-04
Payer: COMMERCIAL

## 2024-01-04 VITALS
HEART RATE: 82 BPM | RESPIRATION RATE: 16 BRPM | DIASTOLIC BLOOD PRESSURE: 78 MMHG | SYSTOLIC BLOOD PRESSURE: 136 MMHG | WEIGHT: 164 LBS | BODY MASS INDEX: 27.29 KG/M2

## 2024-01-04 DIAGNOSIS — F41.9 ANXIETY AND DEPRESSION: ICD-10-CM

## 2024-01-04 DIAGNOSIS — G89.4 CHRONIC PAIN SYNDROME: ICD-10-CM

## 2024-01-04 DIAGNOSIS — F32.A ANXIETY AND DEPRESSION: ICD-10-CM

## 2024-01-04 DIAGNOSIS — F11.20 SEVERE OPIOID USE DISORDER (HCC): Primary | ICD-10-CM

## 2024-01-04 DIAGNOSIS — I10 HYPERTENSION, UNSPECIFIED TYPE: ICD-10-CM

## 2024-01-04 DIAGNOSIS — R10.11 RUQ ABDOMINAL PAIN: ICD-10-CM

## 2024-01-04 PROCEDURE — 99214 OFFICE O/P EST MOD 30 MIN: CPT | Performed by: NURSE PRACTITIONER

## 2024-01-04 PROCEDURE — 3075F SYST BP GE 130 - 139MM HG: CPT | Performed by: NURSE PRACTITIONER

## 2024-01-04 PROCEDURE — 80305 DRUG TEST PRSMV DIR OPT OBS: CPT | Performed by: NURSE PRACTITIONER

## 2024-01-04 PROCEDURE — 3078F DIAST BP <80 MM HG: CPT | Performed by: NURSE PRACTITIONER

## 2024-01-04 PROCEDURE — 96372 THER/PROPH/DIAG INJ SC/IM: CPT | Performed by: NURSE PRACTITIONER

## 2024-01-04 RX ORDER — OLANZAPINE 5 MG/1
5 TABLET ORAL NIGHTLY
Qty: 30 TABLET | Refills: 0 | Status: SHIPPED | OUTPATIENT
Start: 2024-01-04

## 2024-01-04 RX ORDER — CITALOPRAM HYDROBROMIDE 10 MG/1
10 TABLET ORAL DAILY
Qty: 30 TABLET | Refills: 0 | Status: SHIPPED | OUTPATIENT
Start: 2024-01-04

## 2024-01-04 RX ORDER — GABAPENTIN 600 MG/1
600 TABLET ORAL 4 TIMES DAILY
Qty: 120 TABLET | Refills: 0 | Status: SHIPPED | OUTPATIENT
Start: 2024-01-04 | End: 2024-02-03

## 2024-01-04 RX ORDER — FLUOXETINE HYDROCHLORIDE 40 MG/1
40 CAPSULE ORAL DAILY
Qty: 30 CAPSULE | Refills: 1 | Status: SHIPPED | OUTPATIENT
Start: 2024-01-04

## 2024-01-04 RX ORDER — TRIAMTERENE AND HYDROCHLOROTHIAZIDE 75; 50 MG/1; MG/1
1 TABLET ORAL DAILY
Qty: 30 TABLET | Refills: 1 | Status: SHIPPED | OUTPATIENT
Start: 2024-01-04

## 2024-01-04 NOTE — PROGRESS NOTES
Patient stated she has not consumed any Fentanyl or opiates in the last 2 weeks. Patient was educated on the risk of precipitated withdrawals symptoms that could take place with receiving the injections. Patient verbalized understanding.  Consent signed at this time.     Sublocade 100mg SQ injection given to RLQ. Patient tolerated well. No adverse reaction noticed.

## 2024-01-04 NOTE — PROGRESS NOTES
Verbal order per MILLA SOFIA CNP for urine drug screen. Positive for BUP, THC. Verified results with ADONIS WHARTON LPN.

## 2024-01-04 NOTE — PROGRESS NOTES
24   The patients primary care physician is Joi Gaspar, APRN - CNP    Sarai Guajardo is a 58 y.o.  female who presents in office today for follow up medication assisted treatment, substance use disorder.   Sarai presents today for evaluation of severe opioid use     She established with SELVIN Gaspar 10/6/2021    Pt was last seen in office on 10/27/2023  Pt states she was trying to wean off medication- on her own  Pt states she is in bed most day with worsening pain to back and legs. She denies any sensation changes.   Pt reports pain to rt groin and abd. Believes related to sciatica. Requesting steroids.   On assessment she has neg leg raise, no CVA tenderness. No pain on palpation over SI joint or lumbar spine.   No mass to groin  Tender on palpation to RUQ. She denies n/v. Refuses US. Imaging from  reviewed. Pt states will call back in if she changes her mind or pain worsens.     Medication compliance and education reviewed with pt.     She had been prescribed opioids by her PCP for several years for chronic pain issues. She has chronic back pain reportedly. She was referred to PM but did not schedule with them.  Mobic and gabapentin due help.       Past Medical History:   Diagnosis Date    COPD (chronic obstructive pulmonary disease) (HCC)     High blood pressure          Social History     Socioeconomic History    Marital status:      Spouse name: Not on file    Number of children: Not on file    Years of education: Not on file    Highest education level: Not on file   Occupational History    Not on file   Tobacco Use    Smoking status: Former     Current packs/day: 0.00     Average packs/day: 1 pack/day for 42.8 years (42.8 ttl pk-yrs)     Types: Cigarettes     Start date: 1/15/1980     Quit date: 2022     Years since quittin.1    Smokeless tobacco: Never    Tobacco comments:     Quit smoking    Vaping Use    Vaping Use: Never used   Substance and Sexual

## 2024-01-09 DIAGNOSIS — E03.9 HYPOTHYROIDISM, UNSPECIFIED TYPE: ICD-10-CM

## 2024-01-09 RX ORDER — LEVOTHYROXINE SODIUM 0.07 MG/1
75 TABLET ORAL DAILY
Qty: 30 TABLET | Refills: 1 | Status: SHIPPED | OUTPATIENT
Start: 2024-01-09

## 2024-02-08 ENCOUNTER — SOCIAL WORK (OUTPATIENT)
Dept: INTERNAL MEDICINE CLINIC | Age: 59
End: 2024-02-08

## 2024-02-08 ENCOUNTER — TELEPHONE (OUTPATIENT)
Dept: INTERNAL MEDICINE CLINIC | Age: 59
End: 2024-02-08

## 2024-02-08 DIAGNOSIS — G89.4 CHRONIC PAIN SYNDROME: ICD-10-CM

## 2024-02-08 RX ORDER — DOXYCYCLINE HYCLATE 100 MG
100 TABLET ORAL 2 TIMES DAILY
Qty: 14 TABLET | Refills: 0 | Status: SHIPPED | OUTPATIENT
Start: 2024-02-08 | End: 2024-02-15

## 2024-02-08 RX ORDER — GUAIFENESIN 600 MG/1
600 TABLET, EXTENDED RELEASE ORAL 2 TIMES DAILY
Qty: 30 TABLET | Refills: 0 | Status: SHIPPED | OUTPATIENT
Start: 2024-02-08 | End: 2024-02-23

## 2024-02-08 RX ORDER — GABAPENTIN 600 MG/1
600 TABLET ORAL 4 TIMES DAILY
Qty: 16 TABLET | Refills: 0 | Status: SHIPPED | OUTPATIENT
Start: 2024-02-08 | End: 2024-02-12

## 2024-02-08 NOTE — TELEPHONE ENCOUNTER
The patient missed her appt and wanted to know if you will refill her medication. She has an appt on 2/12/24.

## 2024-02-08 NOTE — PROGRESS NOTES
Patient contacted sw, due to missed appointment on 2/1. Patient states that she has been calling since 2/5 to reschedule and needing a refill of gabapentin. Sw staffed with nurse for medications refills, awaiting Joi to respond. uJsta was able to reschedule patient for 2/12 @ 11A  Sw or nurse will contact patient about refills.

## 2024-02-12 ENCOUNTER — OFFICE VISIT (OUTPATIENT)
Dept: INTERNAL MEDICINE CLINIC | Age: 59
End: 2024-02-12
Payer: COMMERCIAL

## 2024-02-12 VITALS
BODY MASS INDEX: 27.99 KG/M2 | HEIGHT: 65 IN | HEART RATE: 82 BPM | DIASTOLIC BLOOD PRESSURE: 89 MMHG | WEIGHT: 168 LBS | SYSTOLIC BLOOD PRESSURE: 139 MMHG

## 2024-02-12 DIAGNOSIS — N18.31 STAGE 3A CHRONIC KIDNEY DISEASE (HCC): ICD-10-CM

## 2024-02-12 DIAGNOSIS — J44.9 CHRONIC OBSTRUCTIVE PULMONARY DISEASE, UNSPECIFIED COPD TYPE (HCC): ICD-10-CM

## 2024-02-12 DIAGNOSIS — R53.83 OTHER FATIGUE: ICD-10-CM

## 2024-02-12 DIAGNOSIS — E43 SEVERE MALNUTRITION (HCC): ICD-10-CM

## 2024-02-12 DIAGNOSIS — E03.9 HYPOTHYROIDISM, UNSPECIFIED TYPE: ICD-10-CM

## 2024-02-12 DIAGNOSIS — F41.9 ANXIETY AND DEPRESSION: ICD-10-CM

## 2024-02-12 DIAGNOSIS — I10 HYPERTENSION, UNSPECIFIED TYPE: ICD-10-CM

## 2024-02-12 DIAGNOSIS — F33.1 MAJOR DEPRESSIVE DISORDER, RECURRENT, MODERATE (HCC): ICD-10-CM

## 2024-02-12 DIAGNOSIS — G89.4 CHRONIC PAIN SYNDROME: ICD-10-CM

## 2024-02-12 DIAGNOSIS — F32.A ANXIETY AND DEPRESSION: ICD-10-CM

## 2024-02-12 DIAGNOSIS — F11.20 SEVERE OPIOID USE DISORDER (HCC): Primary | ICD-10-CM

## 2024-02-12 PROCEDURE — 99214 OFFICE O/P EST MOD 30 MIN: CPT | Performed by: NURSE PRACTITIONER

## 2024-02-12 PROCEDURE — 3075F SYST BP GE 130 - 139MM HG: CPT | Performed by: NURSE PRACTITIONER

## 2024-02-12 PROCEDURE — G2211 COMPLEX E/M VISIT ADD ON: HCPCS | Performed by: NURSE PRACTITIONER

## 2024-02-12 PROCEDURE — 80305 DRUG TEST PRSMV DIR OPT OBS: CPT | Performed by: NURSE PRACTITIONER

## 2024-02-12 PROCEDURE — 3079F DIAST BP 80-89 MM HG: CPT | Performed by: NURSE PRACTITIONER

## 2024-02-12 PROCEDURE — 96372 THER/PROPH/DIAG INJ SC/IM: CPT | Performed by: NURSE PRACTITIONER

## 2024-02-12 RX ORDER — MELOXICAM 7.5 MG/1
7.5 TABLET ORAL 2 TIMES DAILY PRN
Qty: 30 TABLET | Refills: 0 | Status: SHIPPED | OUTPATIENT
Start: 2024-02-12

## 2024-02-12 RX ORDER — METHYLPREDNISOLONE 4 MG/1
TABLET ORAL
Qty: 1 KIT | Refills: 0 | Status: SHIPPED | OUTPATIENT
Start: 2024-02-12 | End: 2024-02-18

## 2024-02-12 RX ORDER — CITALOPRAM HYDROBROMIDE 10 MG/1
10 TABLET ORAL DAILY
Qty: 30 TABLET | Refills: 0 | Status: SHIPPED | OUTPATIENT
Start: 2024-02-12

## 2024-02-12 RX ORDER — TRIAMTERENE AND HYDROCHLOROTHIAZIDE 75; 50 MG/1; MG/1
1 TABLET ORAL DAILY
Qty: 30 TABLET | Refills: 1 | Status: SHIPPED | OUTPATIENT
Start: 2024-02-12

## 2024-02-12 RX ORDER — GABAPENTIN 600 MG/1
600 TABLET ORAL 4 TIMES DAILY
Qty: 16 TABLET | Refills: 0 | Status: SHIPPED | OUTPATIENT
Start: 2024-02-12 | End: 2024-02-16

## 2024-02-12 RX ORDER — FLUOXETINE HYDROCHLORIDE 40 MG/1
40 CAPSULE ORAL DAILY
Qty: 30 CAPSULE | Refills: 1 | Status: SHIPPED | OUTPATIENT
Start: 2024-02-12

## 2024-02-12 RX ORDER — OLANZAPINE 10 MG/1
10 TABLET ORAL NIGHTLY
Qty: 30 TABLET | Refills: 0 | Status: SHIPPED | OUTPATIENT
Start: 2024-02-12

## 2024-02-12 RX ORDER — LEVOTHYROXINE SODIUM 0.07 MG/1
75 TABLET ORAL DAILY
Qty: 30 TABLET | Refills: 1 | Status: SHIPPED | OUTPATIENT
Start: 2024-02-12

## 2024-02-12 RX ORDER — ALBUTEROL SULFATE 90 UG/1
2 AEROSOL, METERED RESPIRATORY (INHALATION) 4 TIMES DAILY PRN
Qty: 54 G | Refills: 1 | Status: SHIPPED | OUTPATIENT
Start: 2024-02-12

## 2024-02-12 NOTE — PROGRESS NOTES
Patient updated PHQ-9 depression screening.  Score:0  Patient denied all symptoms of depression today.  Provider updated.  Paper copy scanned into chart.

## 2024-02-12 NOTE — PROGRESS NOTES
[facility]  [address] [facility]  [address]  [facility] [address]  Dept: [phone]  Dept Fax: [phone]  Loc: [phone]     Visit Date:  2/12/2024    Patient:  Sarai Guajardo  YOB: 1965    HPI:     Chief Complaint   Patient presents with    Drug Problem     Sarai presents today for medical evaluation of severe opioid use disorder, anxiety/depression, weight loss, COPD, tobacco abuse, chronic pain, liver lesion, hypothyroidism, CKD, nausea/vomiting, sciatica     I last seen her 4 months ago.      Bilateral lower back pain with right sciatica. Improved with medrol, but has returned  Right knee pain-      Xray Lumbar Spine 10/6/21:     1. No acute bony abnormality.  2. Mild lumbar spondylosis.  3. Dextroscoliosis.     TSH 1.490 on 10/27/23     Kidney US 6/28  1. Normal bilateral renal ultrasound.  2. Small amount of postvoid residual urine but otherwise unremarkable urinary bladder.  Echocardiogram 12/28/2021:      Ejection fraction is visually estimated at 50%.   There was mild global hypokinesis of the left ventricle.   Aortic valve appears tricuspid.   Aortic valve leaflets are somewhat thickened.     CT scheduled after last visit, as there was a lesion noted on the liver on previous CT of the chest. Cancelled EGD. She also cancelled her CT and pulmonary follow up. Did have her ultrasound of the liver completed.      CT Chest Without Contrast 1/5/23  Impression       The area of linear and irregular consolidation in the left lower lobe posteriorly has partially resolved. There continues to be patchy reticular nodular groundglass opacity in the left lower lobe posteriorly, possibly infectious/inflammatory. A 3 month    follow-up noncontrast CT thorax is advised to ensure continued improvement/resolution.      CT Chest Without Contrast 4/14/23     The small tree-in-bud opacities in the left lower

## 2024-02-12 NOTE — PROGRESS NOTES
Patient stated se has not consumed any Fentanyl or opiates in the last 2 weeks. Patient was educated on the risk of precipitated withdrawals symptoms that could take place with receiving the injections. Patient verbalized understanding.  Consent signed at this time.     Sublocade 100mg SQ injection given to LUQ. Patient tolerated well. No adverse reaction noticed.

## 2024-02-19 DIAGNOSIS — G89.4 CHRONIC PAIN SYNDROME: ICD-10-CM

## 2024-02-19 RX ORDER — GABAPENTIN 600 MG/1
600 TABLET ORAL 4 TIMES DAILY
Qty: 84 TABLET | Refills: 0 | Status: SHIPPED | OUTPATIENT
Start: 2024-02-19 | End: 2024-03-11

## 2024-02-26 NOTE — PROGRESS NOTES
[facility]    Patient: SARAI GUAJARDO, 59 y.o.   : 1965    Patient of Dr. Serrano    Assessment/Plan   1. Stage 3 severe COPD by GOLD classification - acutely exacerbated  -Start prednisone 40 mg x 5 days. Take with food  -Patient was recently prescribed doxycyline by primary care provider. No current fevers/chills. Defer repeat antibiotic for now  -Strongly discouraged patient from overusing Breztri. Advised patient to use as prescribed 2 puffs twice daily. Rinse mouth with water, gargle, and spit after use.   -Sarai Guajardo was evaluated today and a DME order was entered for a nebulizer compressor in order to administer Albuterol due to the diagnosis of COPD.  The need for this equipment and treatment was discussed with the patient and she understands and is in agreement.     -Continue albuterol inhaler and start albuterol nebulizer, one or the other, every 6 hours as needed for shortness of breath or wheezing   -Respiratory Therapy Supplies (NEBULIZER/TUBING/MOUTHPIECE) KIT; 1 kit by Does not apply route every 6 hours as needed (shortness of breath)  -Obtain spirometry prior to next visit   -Reviewed preventative vaccinations    2. Chronic respiratory failure with hypoxia (HCC) - non compliance with O2 use (using during nighttime and not during the day as prescribed)  -6 Minute Walk Test - 2 lpm via POC     3. Personal history of tobacco use  -Lung screening due in 2024. Orders placed  -VT VISIT TO DISCUSS LUNG CA SCREEN W LDCT  -CT Lung Screen (Annual)    4. TAN - non compliant with use  -Encouraged patient to resume CPAP therapy, advised patient on complications of untreated sleep apnea and effect of untreated sleep apnea on patient's with COPD.  -Patient given sample nasal pillow to trial  -Advised patient to reschedule appt with sleep clinic    5. Weight gain and leg swelling  -Encouraged patient to obtain ECHO ordered by primary care

## 2024-02-27 ENCOUNTER — OFFICE VISIT (OUTPATIENT)
Dept: PULMONOLOGY | Age: 59
End: 2024-02-27
Payer: COMMERCIAL

## 2024-02-27 VITALS
HEIGHT: 65 IN | OXYGEN SATURATION: 93 % | HEART RATE: 69 BPM | SYSTOLIC BLOOD PRESSURE: 130 MMHG | WEIGHT: 183 LBS | TEMPERATURE: 98.1 F | BODY MASS INDEX: 30.49 KG/M2 | DIASTOLIC BLOOD PRESSURE: 82 MMHG

## 2024-02-27 DIAGNOSIS — G89.4 CHRONIC PAIN SYNDROME: ICD-10-CM

## 2024-02-27 DIAGNOSIS — J44.9 STAGE 3 SEVERE COPD BY GOLD CLASSIFICATION (HCC): Primary | ICD-10-CM

## 2024-02-27 DIAGNOSIS — R63.5 WEIGHT GAIN: ICD-10-CM

## 2024-02-27 DIAGNOSIS — Z91.199 NON COMPLIANCE WITH MEDICAL TREATMENT: ICD-10-CM

## 2024-02-27 DIAGNOSIS — G47.33 OSA (OBSTRUCTIVE SLEEP APNEA): ICD-10-CM

## 2024-02-27 DIAGNOSIS — Z87.891 PERSONAL HISTORY OF TOBACCO USE: ICD-10-CM

## 2024-02-27 DIAGNOSIS — M79.89 LEG SWELLING: ICD-10-CM

## 2024-02-27 DIAGNOSIS — Z77.22 PASSIVE SMOKE EXPOSURE: ICD-10-CM

## 2024-02-27 DIAGNOSIS — J96.11 CHRONIC RESPIRATORY FAILURE WITH HYPOXIA (HCC): ICD-10-CM

## 2024-02-27 PROCEDURE — 99214 OFFICE O/P EST MOD 30 MIN: CPT

## 2024-02-27 PROCEDURE — 94618 PULMONARY STRESS TESTING: CPT

## 2024-02-27 PROCEDURE — G0296 VISIT TO DETERM LDCT ELIG: HCPCS

## 2024-02-27 RX ORDER — NEBULIZER ACCESSORIES
1 KIT MISCELLANEOUS EVERY 6 HOURS PRN
Qty: 1 KIT | Refills: 1 | Status: SHIPPED | OUTPATIENT
Start: 2024-02-27 | End: 2025-02-26

## 2024-02-27 RX ORDER — NEBULIZER ACCESSORIES
1 KIT MISCELLANEOUS EVERY 6 HOURS PRN
Qty: 1 KIT | Refills: 1 | Status: SHIPPED | OUTPATIENT
Start: 2024-02-27 | End: 2024-02-27

## 2024-02-27 RX ORDER — MELOXICAM 7.5 MG/1
7.5 TABLET ORAL 2 TIMES DAILY PRN
Qty: 30 TABLET | Refills: 0 | Status: SHIPPED | OUTPATIENT
Start: 2024-02-27

## 2024-02-27 RX ORDER — PREDNISONE 20 MG/1
40 TABLET ORAL DAILY
Qty: 10 TABLET | Refills: 0 | Status: SHIPPED | OUTPATIENT
Start: 2024-02-27 | End: 2024-03-03

## 2024-02-27 RX ORDER — ALBUTEROL SULFATE 2.5 MG/3ML
2.5 SOLUTION RESPIRATORY (INHALATION) EVERY 6 HOURS PRN
Qty: 120 EACH | Refills: 11 | Status: SHIPPED | OUTPATIENT
Start: 2024-02-27 | End: 2025-02-26

## 2024-02-27 ASSESSMENT — ENCOUNTER SYMPTOMS
CHEST TIGHTNESS: 1
SINUS PRESSURE: 0
SINUS PAIN: 0
COUGH: 1
RHINORRHEA: 0
SHORTNESS OF BREATH: 1
WHEEZING: 1

## 2024-02-27 NOTE — PATIENT INSTRUCTIONS
20 cigarettes per pack) you have smoked by how many years you have smoked. For example:  If you smoked 1 pack a day for 20 years, that's 1 times 20. So you have a smoking history of 20 pack years.  If you smoked 2 packs a day for 10 years, that's 2 times 10. So you have a smoking history of 20 pack years.  Experts agree that screening is for people who have a high risk of lung cancer. But experts don't agree on what high risk means. Some say people age 50 or older with at least a 20-pack-year smoking history are high risk. Others say it's people age 55 or older with a 30-pack-year history.  To see if you could benefit from screening, first find out if you are at high risk for lung cancer. Your doctor can help you decide your lung cancer risk.  What are the risks of screening?  CT screening for lung cancer isn't perfect. It can show an abnormal result when it turns out there wasn't any cancer. This is called a false-positive result. This means you may need more tests to make sure you don't have cancer. These tests can be harmful and cause a lot of worry.  These tests may include more CT scans and invasive testing like a lung biopsy. In a biopsy, the doctor takes a sample of tissue from inside your lung so it can be looked at under a microscope. A biopsy is the only way to tell if you have lung cancer. If the biopsy finds cancer, you and your doctor will have to decide how or whether to treat it.  Some lung cancers found on CT scans are harmless and would not have caused a problem if they had not been found through screening. But because doctors can't tell which ones will turn out to be harmless, most will be treated. This means that you may get treatment--including surgery, radiation, or chemotherapy--that you don't need.  There is a risk of damage to cells or tissue from being exposed to radiation, including the small amounts used in CTs, X-rays, and other medical tests. Over time, exposure to radiation may cause

## 2024-02-29 DIAGNOSIS — R00.1 SYMPTOMATIC BRADYCARDIA: Primary | ICD-10-CM

## 2024-02-29 DIAGNOSIS — I10 HYPERTENSION, UNSPECIFIED TYPE: ICD-10-CM

## 2024-03-11 ENCOUNTER — TELEPHONE (OUTPATIENT)
Dept: INTERNAL MEDICINE CLINIC | Age: 59
End: 2024-03-11

## 2024-03-11 NOTE — TELEPHONE ENCOUNTER
This RN called the patient back and got no answer. This RN left a message for the patient to call back.

## 2024-03-12 ENCOUNTER — TELEPHONE (OUTPATIENT)
Dept: INTERNAL MEDICINE CLINIC | Age: 59
End: 2024-03-12

## 2024-03-12 DIAGNOSIS — F32.A ANXIETY AND DEPRESSION: ICD-10-CM

## 2024-03-12 DIAGNOSIS — F41.9 ANXIETY AND DEPRESSION: ICD-10-CM

## 2024-03-12 DIAGNOSIS — G89.4 CHRONIC PAIN SYNDROME: ICD-10-CM

## 2024-03-12 RX ORDER — MELOXICAM 7.5 MG/1
7.5 TABLET ORAL 2 TIMES DAILY PRN
Qty: 4 TABLET | Refills: 0 | Status: SHIPPED | OUTPATIENT
Start: 2024-03-12 | End: 2024-03-14

## 2024-03-12 RX ORDER — OLANZAPINE 10 MG/1
10 TABLET ORAL NIGHTLY
Qty: 2 TABLET | Refills: 0 | Status: SHIPPED | OUTPATIENT
Start: 2024-03-12 | End: 2024-03-14

## 2024-03-12 RX ORDER — CITALOPRAM HYDROBROMIDE 10 MG/1
10 TABLET ORAL DAILY
Qty: 2 TABLET | Refills: 0 | Status: SHIPPED | OUTPATIENT
Start: 2024-03-12 | End: 2024-03-14

## 2024-03-12 RX ORDER — GABAPENTIN 600 MG/1
600 TABLET ORAL 4 TIMES DAILY
Qty: 8 TABLET | Refills: 0 | Status: SHIPPED | OUTPATIENT
Start: 2024-03-12 | End: 2024-03-15 | Stop reason: SDUPTHER

## 2024-03-12 NOTE — TELEPHONE ENCOUNTER
The patient had to reschedule her appt due to having to pay off her bill for her shot. The patient was wondering if you would fill until Thursday.

## 2024-03-12 NOTE — TELEPHONE ENCOUNTER
Called pt and left a message to call [facility] for her to call and pay her balance owed for sublocade shot, she has not called and her shot will not be here delivered due to  not paying her balance

## 2024-03-15 DIAGNOSIS — G89.4 CHRONIC PAIN SYNDROME: ICD-10-CM

## 2024-03-15 RX ORDER — GABAPENTIN 600 MG/1
600 TABLET ORAL 4 TIMES DAILY
Qty: 12 TABLET | Refills: 0 | Status: SHIPPED | OUTPATIENT
Start: 2024-03-15 | End: 2024-03-18

## 2024-03-18 ENCOUNTER — NURSE ONLY (OUTPATIENT)
Dept: INTERNAL MEDICINE CLINIC | Age: 59
End: 2024-03-18
Payer: COMMERCIAL

## 2024-03-18 ENCOUNTER — OFFICE VISIT (OUTPATIENT)
Dept: INTERNAL MEDICINE CLINIC | Age: 59
End: 2024-03-18

## 2024-03-18 VITALS
SYSTOLIC BLOOD PRESSURE: 135 MMHG | HEART RATE: 92 BPM | HEIGHT: 65 IN | BODY MASS INDEX: 28.32 KG/M2 | WEIGHT: 170 LBS | DIASTOLIC BLOOD PRESSURE: 80 MMHG

## 2024-03-18 DIAGNOSIS — R53.83 OTHER FATIGUE: ICD-10-CM

## 2024-03-18 DIAGNOSIS — F32.A ANXIETY AND DEPRESSION: ICD-10-CM

## 2024-03-18 DIAGNOSIS — J44.9 CHRONIC OBSTRUCTIVE PULMONARY DISEASE, UNSPECIFIED COPD TYPE (HCC): ICD-10-CM

## 2024-03-18 DIAGNOSIS — G89.4 CHRONIC PAIN SYNDROME: ICD-10-CM

## 2024-03-18 DIAGNOSIS — F41.9 ANXIETY AND DEPRESSION: ICD-10-CM

## 2024-03-18 DIAGNOSIS — E43 SEVERE MALNUTRITION (HCC): Primary | Chronic | ICD-10-CM

## 2024-03-18 DIAGNOSIS — F11.20 SEVERE OPIOID USE DISORDER (HCC): Primary | ICD-10-CM

## 2024-03-18 DIAGNOSIS — I10 HYPERTENSION, UNSPECIFIED TYPE: ICD-10-CM

## 2024-03-18 DIAGNOSIS — J44.9 STAGE 3 SEVERE COPD BY GOLD CLASSIFICATION (HCC): ICD-10-CM

## 2024-03-18 DIAGNOSIS — R06.02 SHORTNESS OF BREATH: ICD-10-CM

## 2024-03-18 DIAGNOSIS — E03.9 HYPOTHYROIDISM, UNSPECIFIED TYPE: ICD-10-CM

## 2024-03-18 LAB
25(OH)D3 SERPL-MCNC: 43 NG/ML (ref 30–100)
ALBUMIN SERPL BCG-MCNC: 4.3 G/DL (ref 3.5–5.1)
ALCOHOL URINE: ABNORMAL
ALP SERPL-CCNC: 88 U/L (ref 38–126)
ALT SERPL W/O P-5'-P-CCNC: 12 U/L (ref 11–66)
AMPHETAMINE SCREEN, URINE: ABNORMAL
ANION GAP SERPL CALC-SCNC: 16 MEQ/L (ref 8–16)
AST SERPL-CCNC: 19 U/L (ref 5–40)
BARBITURATE SCREEN, URINE: ABNORMAL
BENZODIAZEPINE SCREEN, URINE: ABNORMAL
BILIRUB SERPL-MCNC: 0.6 MG/DL (ref 0.3–1.2)
BUN SERPL-MCNC: 30 MG/DL (ref 7–22)
BUPRENORPHINE URINE: ABNORMAL
CALCIUM SERPL-MCNC: 10.8 MG/DL (ref 8.5–10.5)
CHLORIDE SERPL-SCNC: 96 MEQ/L (ref 98–111)
CHOLEST SERPL-MCNC: 252 MG/DL (ref 100–199)
CO2 SERPL-SCNC: 26 MEQ/L (ref 23–33)
COCAINE METABOLITE SCREEN URINE: ABNORMAL
CREAT SERPL-MCNC: 1.4 MG/DL (ref 0.4–1.2)
DEPRECATED RDW RBC AUTO: 51.9 FL (ref 35–45)
ERYTHROCYTE [DISTWIDTH] IN BLOOD BY AUTOMATED COUNT: 14.7 % (ref 11.5–14.5)
FENTANYL SCREEN, URINE: ABNORMAL
GABAPENTIN SCREEN, URINE: ABNORMAL
GFR SERPL CREATININE-BSD FRML MDRD: 43 ML/MIN/1.73M2
GLUCOSE SERPL-MCNC: 93 MG/DL (ref 70–108)
HCT VFR BLD AUTO: 48.5 % (ref 37–47)
HDLC SERPL-MCNC: 60 MG/DL
HGB BLD-MCNC: 15.8 GM/DL (ref 12–16)
IRON SATN MFR SERPL: 36 % (ref 20–50)
IRON SERPL-MCNC: 112 UG/DL (ref 50–170)
LDLC SERPL CALC-MCNC: 152 MG/DL
MCH RBC QN AUTO: 31.2 PG (ref 26–33)
MCHC RBC AUTO-ENTMCNC: 32.6 GM/DL (ref 32.2–35.5)
MCV RBC AUTO: 95.8 FL (ref 81–99)
MDMA URINE: ABNORMAL
METHADONE SCREEN, URINE: ABNORMAL
METHAMPHETAMINE, URINE: ABNORMAL
OPIATE SCREEN URINE: ABNORMAL
OXYCODONE SCREEN URINE: ABNORMAL
PHENCYCLIDINE SCREEN URINE: ABNORMAL
PLATELET # BLD AUTO: 351 THOU/MM3 (ref 130–400)
PMV BLD AUTO: 9.2 FL (ref 9.4–12.4)
POTASSIUM SERPL-SCNC: 4.1 MEQ/L (ref 3.5–5.2)
PROPOXYPHENE SCREEN, URINE: ABNORMAL
PROT SERPL-MCNC: 8.2 G/DL (ref 6.1–8)
RBC # BLD AUTO: 5.06 MILL/MM3 (ref 4.2–5.4)
SODIUM SERPL-SCNC: 138 MEQ/L (ref 135–145)
SYNTHETIC CANNABINOIDS(K2) SCREEN, URINE: ABNORMAL
THC SCREEN, URINE: ABNORMAL
TIBC SERPL-MCNC: 307 UG/DL (ref 171–450)
TRAMADOL SCREEN URINE: ABNORMAL
TRICYCLIC ANTIDEPRESSANTS, UR: ABNORMAL
TRIGL SERPL-MCNC: 202 MG/DL (ref 0–199)
WBC # BLD AUTO: 11 THOU/MM3 (ref 4.8–10.8)

## 2024-03-18 PROCEDURE — 36415 COLL VENOUS BLD VENIPUNCTURE: CPT | Performed by: NURSE PRACTITIONER

## 2024-03-18 RX ORDER — MELOXICAM 7.5 MG/1
7.5 TABLET ORAL 2 TIMES DAILY PRN
Qty: 4 TABLET | Refills: 0 | Status: SHIPPED | OUTPATIENT
Start: 2024-03-18 | End: 2024-03-18 | Stop reason: SDUPTHER

## 2024-03-18 RX ORDER — OLANZAPINE 10 MG/1
10 TABLET ORAL NIGHTLY
Qty: 90 TABLET | Refills: 0 | Status: SHIPPED | OUTPATIENT
Start: 2024-03-18 | End: 2024-06-16

## 2024-03-18 RX ORDER — GABAPENTIN 600 MG/1
600 TABLET ORAL 4 TIMES DAILY
Qty: 12 TABLET | Refills: 0 | Status: SHIPPED | OUTPATIENT
Start: 2024-03-18 | End: 2024-03-18

## 2024-03-18 RX ORDER — GABAPENTIN 600 MG/1
600 TABLET ORAL 4 TIMES DAILY
Qty: 360 TABLET | Refills: 0 | Status: SHIPPED | OUTPATIENT
Start: 2024-03-18 | End: 2024-06-16

## 2024-03-18 RX ORDER — MELOXICAM 7.5 MG/1
7.5 TABLET ORAL 2 TIMES DAILY PRN
Qty: 4 TABLET | Refills: 0 | Status: SHIPPED | OUTPATIENT
Start: 2024-03-18 | End: 2024-03-18

## 2024-03-18 RX ORDER — CITALOPRAM HYDROBROMIDE 10 MG/1
10 TABLET ORAL DAILY
Qty: 2 TABLET | Refills: 0 | Status: SHIPPED | OUTPATIENT
Start: 2024-03-18 | End: 2024-03-18

## 2024-03-18 RX ORDER — CITALOPRAM HYDROBROMIDE 10 MG/1
10 TABLET ORAL DAILY
Qty: 90 TABLET | Refills: 0 | Status: SHIPPED | OUTPATIENT
Start: 2024-03-18 | End: 2024-06-16

## 2024-03-18 RX ORDER — MELOXICAM 7.5 MG/1
7.5 TABLET ORAL 2 TIMES DAILY PRN
Qty: 180 TABLET | Refills: 0 | Status: SHIPPED | OUTPATIENT
Start: 2024-03-18 | End: 2024-06-16

## 2024-03-18 RX ORDER — OLANZAPINE 10 MG/1
10 TABLET ORAL NIGHTLY
Qty: 2 TABLET | Refills: 0 | Status: SHIPPED | OUTPATIENT
Start: 2024-03-18 | End: 2024-03-18

## 2024-03-18 NOTE — PROGRESS NOTES
Verified pt information, then landy blood out of pt right arm, pt had not complications and did well

## 2024-03-18 NOTE — PROGRESS NOTES
Patient stated she has not consumed any Fentanyl or opiates in the last 2 weeks. Patient was educated on the risk of precipitated withdrawals symptoms that could take place with receiving the injections. Patient verbalized understanding.       Sublocade 100 mg SQ injection given into RLQ. Patient tolerated well. No adverse reaction noticed.   
respiratory distress.      Breath sounds: Examination of the right-lower field reveals decreased breath sounds. Examination of the left-lower field reveals decreased breath sounds. Decreased breath sounds present. No rhonchi.   Abdominal:      General: Abdomen is flat. Bowel sounds are normal. There is no distension.      Palpations: Abdomen is soft.   Musculoskeletal:         General: Normal range of motion.      Cervical back: Normal range of motion and neck supple.      Right lower leg: No edema.      Left lower leg: No edema.   Skin:     General: Skin is warm and dry.      Findings: No erythema, lesion or rash.   Neurological:      General: No focal deficit present.      Mental Status: She is alert and oriented to person, place, and time.   Psychiatric:         Mood and Affect: Mood normal.         Behavior: Behavior normal.         Thought Content: Thought content normal.         Judgment: Judgment normal.         Labs Reviewed 3/18/2024:    Lab Results   Component Value Date    WBC 11.0 (H) 03/18/2024    HGB 15.8 03/18/2024    HCT 48.5 (H) 03/18/2024     03/18/2024    CHOL 252 (H) 03/18/2024    TRIG 202 (H) 03/18/2024    HDL 60 03/18/2024    ALT 12 03/18/2024    AST 19 03/18/2024     03/18/2024    K 4.1 03/18/2024    CL 96 (L) 03/18/2024    CREATININE 1.4 (H) 03/18/2024    BUN 30 (H) 03/18/2024    CO2 26 03/18/2024    TSH 4.640 (H) 03/18/2024    INR 0.99 12/31/2021    LABA1C 5.8 03/18/2024       Assessment/Plan      1. Severe opioid use disorder (HCC)    - POCT Rapid Drug Screen  - buprenorphine er (SUBLOCADE) injection SOSY 100 mg  - MS THERAPEUTIC PROPHYLACTIC/DX INJECTION SUBQ/IM  - Narcan at home  - OARRS reviewed, no discrepancies  - Encouraged to attend NA/AA meetings and/or arrange for individualized counseling  - sublocade given by nurse- tolerated well    2. Anxiety and depression    - citalopram (CELEXA) 10 MG tablet; Take 1 tablet by mouth daily  Dispense: 90 tablet; Refill: 0  -

## 2024-03-19 LAB
FERRITIN SERPL IA-MCNC: 142 NG/ML (ref 10–291)
FOLATE SERPL-MCNC: > 20 NG/ML (ref 4.8–24.2)
NT-PROBNP SERPL IA-MCNC: 382.2 PG/ML (ref 0–124)
T4 FREE SERPL-MCNC: 1.3 NG/DL (ref 0.93–1.68)
TSH SERPL DL<=0.005 MIU/L-ACNC: 4.64 UIU/ML (ref 0.4–4.2)
VIT B12 SERPL-MCNC: 1302 PG/ML (ref 211–911)

## 2024-03-21 ENCOUNTER — OFFICE VISIT (OUTPATIENT)
Dept: NEPHROLOGY | Age: 59
End: 2024-03-21
Payer: COMMERCIAL

## 2024-03-21 VITALS
SYSTOLIC BLOOD PRESSURE: 129 MMHG | WEIGHT: 170 LBS | BODY MASS INDEX: 28.29 KG/M2 | DIASTOLIC BLOOD PRESSURE: 69 MMHG | HEART RATE: 92 BPM | OXYGEN SATURATION: 94 %

## 2024-03-21 DIAGNOSIS — N18.31 STAGE 3A CHRONIC KIDNEY DISEASE (HCC): Primary | ICD-10-CM

## 2024-03-21 DIAGNOSIS — E83.52 HYPERCALCEMIA: ICD-10-CM

## 2024-03-21 DIAGNOSIS — I10 ESSENTIAL HYPERTENSION: ICD-10-CM

## 2024-03-21 LAB
DEPRECATED MEAN GLUCOSE BLD GHB EST-ACNC: 114 MG/DL (ref 70–126)
HBA1C MFR BLD HPLC: 5.8 % (ref 4.4–6.4)

## 2024-03-21 PROCEDURE — 99213 OFFICE O/P EST LOW 20 MIN: CPT | Performed by: INTERNAL MEDICINE

## 2024-03-21 PROCEDURE — 3074F SYST BP LT 130 MM HG: CPT | Performed by: INTERNAL MEDICINE

## 2024-03-21 PROCEDURE — 3078F DIAST BP <80 MM HG: CPT | Performed by: INTERNAL MEDICINE

## 2024-03-21 ASSESSMENT — ENCOUNTER SYMPTOMS
COUGH: 0
VOMITING: 1
WHEEZING: 0
NAUSEA: 1
CONSTIPATION: 0
BLOOD IN STOOL: 0
TROUBLE SWALLOWING: 0
SINUS PRESSURE: 0
BACK PAIN: 1
SHORTNESS OF BREATH: 1
ABDOMINAL PAIN: 0
ABDOMINAL DISTENTION: 0
SINUS PAIN: 0
DIARRHEA: 0
SORE THROAT: 0
RHINORRHEA: 0
PHOTOPHOBIA: 0

## 2024-03-21 NOTE — PROGRESS NOTES
[facility]        Outpatient Follow-Up note         3/21/2024 11:52 AM    Patient Name:   Sarai Guajardo  YOB: 1965  Primary Care Physician:  Joi Gaspar, APRN - CNP   [facility]  [facility]  [address]  [address]  [place] [address]  Dept: [phone]  Loc: [phone]     Chief Complaint / Reason for follow-up : Follow Up of CKD     Interval History :  Patient seen and examined by me.      Past History :  Past Medical History:   Diagnosis Date    COPD (chronic obstructive pulmonary disease) (HCC)     High blood pressure      Past Surgical History:   Procedure Laterality Date    COLONOSCOPY      CYST REMOVAL      vagina    HERNIA REPAIR  04/01/2002    LAPAROSCOPY  1994?    MOHS SURGERY Left 08/15/2022    MOHS REPAIR BCC LEFT ANTERIOR LOWER LEG WITH FTSG FROM LEFT UPPER THIGH performed by Gurdeep Mcmahon MD at [facility] OR    TUBAL LIGATION  04/01/2002    WRIST SURGERY Right         Medications :     Outpatient Medications Marked as Taking for the 3/21/24 encounter (Office Visit) with Lauri Soto MD   Medication Sig Dispense Refill    citalopram (CELEXA) 10 MG tablet Take 1 tablet by mouth daily 90 tablet 0    gabapentin (NEURONTIN) 600 MG tablet Take 1 tablet by mouth in the morning, at noon, in the evening, and at bedtime for 90 days. 360 tablet 0    meloxicam (MOBIC) 7.5 MG tablet Take 1 tablet by mouth 2 times daily as needed for Pain take 1 tablet by mouth once daily 180 tablet 0    OLANZapine (ZYPREXA) 10 MG tablet Take 1 tablet by mouth nightly at bedtime. 90 tablet 0    albuterol (PROVENTIL) (2.5 MG/3ML) 0.083% nebulizer solution Take 3 mLs by nebulization every 6 hours as needed for Wheezing or Shortness of Breath 120 each 11    Respiratory Therapy Supplies (NEBULIZER/TUBING/MOUTHPIECE) KIT 1 kit by Does not apply route every 6 hours as needed (shortness of

## 2024-03-25 ENCOUNTER — HOSPITAL ENCOUNTER (OUTPATIENT)
Dept: CT IMAGING | Age: 59
Discharge: HOME | End: 2024-03-25
Payer: COMMERCIAL

## 2024-03-25 ENCOUNTER — HOSPITAL ENCOUNTER (OUTPATIENT)
Age: 59
Discharge: HOME | End: 2024-03-27
Payer: COMMERCIAL

## 2024-03-25 VITALS
HEIGHT: 65 IN | DIASTOLIC BLOOD PRESSURE: 69 MMHG | BODY MASS INDEX: 28.32 KG/M2 | WEIGHT: 170 LBS | SYSTOLIC BLOOD PRESSURE: 129 MMHG

## 2024-03-25 DIAGNOSIS — I10 HYPERTENSION, UNSPECIFIED TYPE: ICD-10-CM

## 2024-03-25 DIAGNOSIS — M54.41 ACUTE MIDLINE LOW BACK PAIN WITH RIGHT-SIDED SCIATICA: ICD-10-CM

## 2024-03-25 DIAGNOSIS — R00.1 SYMPTOMATIC BRADYCARDIA: ICD-10-CM

## 2024-03-25 LAB
ECHO AV CUSP MM: 1.9 CM
ECHO AV PEAK GRADIENT: 6 MMHG
ECHO AV PEAK VELOCITY: 1.2 M/S
ECHO AV VELOCITY RATIO: 0.67
ECHO BSA: 1.88 M2
ECHO EST RA PRESSURE: 5 MMHG
ECHO LA AREA 2C: 8.5 CM2
ECHO LA AREA 4C: 9.1 CM2
ECHO LA DIAMETER INDEX: 1.24 CM/M2
ECHO LA DIAMETER: 2.3 CM
ECHO LA MAJOR AXIS: 3.7 CM
ECHO LA MINOR AXIS: 3.8 CM
ECHO LA VOL BP: 17 ML (ref 22–52)
ECHO LA VOL MOD A2C: 16 ML (ref 22–52)
ECHO LA VOL MOD A4C: 19 ML (ref 22–52)
ECHO LA VOL/BSA BIPLANE: 9 ML/M2 (ref 16–34)
ECHO LA VOLUME INDEX MOD A2C: 9 ML/M2 (ref 16–34)
ECHO LA VOLUME INDEX MOD A4C: 10 ML/M2 (ref 16–34)
ECHO LV E' LATERAL VELOCITY: 8 CM/S
ECHO LV E' SEPTAL VELOCITY: 8 CM/S
ECHO LV FRACTIONAL SHORTENING: 27 % (ref 28–44)
ECHO LV INTERNAL DIMENSION DIASTOLE INDEX: 2.22 CM/M2
ECHO LV INTERNAL DIMENSION DIASTOLIC: 4.1 CM (ref 3.9–5.3)
ECHO LV INTERNAL DIMENSION SYSTOLIC INDEX: 1.62 CM/M2
ECHO LV INTERNAL DIMENSION SYSTOLIC: 3 CM
ECHO LV ISOVOLUMETRIC RELAXATION TIME (IVRT): 67 MS
ECHO LV IVSD: 0.9 CM (ref 0.6–0.9)
ECHO LV MASS 2D: 97.3 G (ref 67–162)
ECHO LV MASS INDEX 2D: 52.6 G/M2 (ref 43–95)
ECHO LV POSTERIOR WALL DIASTOLIC: 0.7 CM (ref 0.6–0.9)
ECHO LV RELATIVE WALL THICKNESS RATIO: 0.34
ECHO LVOT PEAK GRADIENT: 3 MMHG
ECHO LVOT PEAK VELOCITY: 0.8 M/S
ECHO MV A VELOCITY: 0.72 M/S
ECHO MV E DECELERATION TIME (DT): 162 MS
ECHO MV E VELOCITY: 0.52 M/S
ECHO MV E/A RATIO: 0.72
ECHO MV E/E' LATERAL: 6.5
ECHO MV E/E' RATIO (AVERAGED): 6.5
ECHO PV MAX VELOCITY: 1.2 M/S
ECHO PV PEAK GRADIENT: 6 MMHG
ECHO RV INTERNAL DIMENSION: 2.8 CM
ECHO RV TAPSE: 1.8 CM (ref 1.7–?)
ECHO TV E WAVE: 0.5 M/S

## 2024-03-25 PROCEDURE — 93306 TTE W/DOPPLER COMPLETE: CPT | Performed by: INTERNAL MEDICINE

## 2024-03-25 PROCEDURE — 93306 TTE W/DOPPLER COMPLETE: CPT

## 2024-03-25 PROCEDURE — 72131 CT LUMBAR SPINE W/O DYE: CPT

## 2024-03-26 DIAGNOSIS — R93.7 ABNORMAL CT SCAN, LUMBAR SPINE: Primary | ICD-10-CM

## 2024-04-16 DIAGNOSIS — I10 HYPERTENSION, UNSPECIFIED TYPE: ICD-10-CM

## 2024-04-16 RX ORDER — TRIAMTERENE AND HYDROCHLOROTHIAZIDE 75; 50 MG/1; MG/1
1 TABLET ORAL DAILY
Qty: 30 TABLET | Refills: 1 | Status: SHIPPED | OUTPATIENT
Start: 2024-04-16

## 2024-04-23 ENCOUNTER — OFFICE VISIT (OUTPATIENT)
Dept: INTERNAL MEDICINE CLINIC | Age: 59
End: 2024-04-23

## 2024-04-23 VITALS
DIASTOLIC BLOOD PRESSURE: 76 MMHG | SYSTOLIC BLOOD PRESSURE: 139 MMHG | HEART RATE: 74 BPM | WEIGHT: 166 LBS | HEIGHT: 65 IN | BODY MASS INDEX: 27.66 KG/M2

## 2024-04-23 DIAGNOSIS — F11.20 SEVERE OPIOID USE DISORDER (HCC): Primary | ICD-10-CM

## 2024-04-23 DIAGNOSIS — F11.20 SEVERE OPIOID USE DISORDER ON MAINTENANCE THERAPY (HCC): ICD-10-CM

## 2024-04-23 ASSESSMENT — ENCOUNTER SYMPTOMS
DIARRHEA: 0
PHOTOPHOBIA: 0
TROUBLE SWALLOWING: 0
VOMITING: 0
WHEEZING: 0
BACK PAIN: 1
RHINORRHEA: 0
SORE THROAT: 0
BLOOD IN STOOL: 0
SHORTNESS OF BREATH: 1
CONSTIPATION: 0
ABDOMINAL DISTENTION: 0
SINUS PRESSURE: 0
COUGH: 0
ABDOMINAL PAIN: 0
NAUSEA: 0
SINUS PAIN: 0

## 2024-04-23 NOTE — PROGRESS NOTES
Patient stated she has not consumed any Fentanyl or opiates in the last 2 weeks. Patient was educated on the risk of precipitated withdrawals symptoms that could take place with receiving the injections. Patient verbalized understanding.  Consent signed at this time.     Sublocade 100mg SQ injection given to LLQ. Patient tolerated well. No adverse reaction noticed.   
screen  09/08/2023    Pneumococcal 0-64 years Vaccine (1 of 2 - PCV) 09/08/2024 (Originally 2/2/1969)    Cervical cancer screen  02/04/2025 (Originally 2/2/1984)    DTaP/Tdap/Td vaccine (1 - Tdap) 02/12/2025 (Originally 2/2/1982)    Flu vaccine (Season Ended) 02/12/2025 (Originally 8/1/2024)    Shingles vaccine (1 of 2) 02/12/2025 (Originally 2/2/2013)    COVID-19 Vaccine (5 - 2023-24 season) 02/12/2025 (Originally 9/1/2023)    Respiratory Syncytial Virus (RSV) Pregnant or age 60 yrs+ (1 - 1-dose 60+ series) 02/12/2025 (Originally 2/2/2023)    Colorectal Cancer Screen  09/08/2024    Depression Monitoring  02/12/2025    A1C test (Diabetic or Prediabetic)  03/18/2025    Lipids  03/18/2025    GFR test (Diabetes, CKD 3-4, OR last GFR 15-59)  03/18/2025    Hepatitis A vaccine  Aged Out    Hepatitis B vaccine  Aged Out    Hib vaccine  Aged Out    Polio vaccine  Aged Out    Meningococcal (ACWY) vaccine  Aged Out    Hepatitis C screen  Discontinued    HIV screen  Discontinued    Low dose CT lung screening &/or counseling  Discontinued       Subjective:      Review of Systems   Constitutional:  Positive for fatigue. Negative for chills, fever and unexpected weight change.   HENT:  Negative for congestion, dental problem, ear pain, hearing loss, rhinorrhea, sinus pressure, sinus pain, sore throat, tinnitus and trouble swallowing.    Eyes:  Negative for photophobia and visual disturbance.   Respiratory:  Positive for shortness of breath (on exertion). Negative for cough and wheezing.    Cardiovascular:  Negative for chest pain, palpitations and leg swelling.   Gastrointestinal:  Negative for abdominal distention, abdominal pain, blood in stool, constipation, diarrhea, nausea and vomiting.   Endocrine: Negative for polydipsia, polyphagia and polyuria.   Genitourinary:  Negative for difficulty urinating, dysuria, frequency, hematuria and urgency.   Musculoskeletal:  Positive for back pain (chronic). Negative for arthralgias and

## 2024-04-26 ENCOUNTER — HOSPITAL ENCOUNTER (OUTPATIENT)
Dept: CT IMAGING | Age: 59
Discharge: HOME | End: 2024-04-26
Payer: COMMERCIAL

## 2024-04-26 ENCOUNTER — HOSPITAL ENCOUNTER (OUTPATIENT)
Dept: PULMONOLOGY | Age: 59
Discharge: HOME | End: 2024-04-26
Payer: COMMERCIAL

## 2024-04-26 DIAGNOSIS — Z87.891 PERSONAL HISTORY OF TOBACCO USE: ICD-10-CM

## 2024-04-26 DIAGNOSIS — J44.9 STAGE 3 SEVERE COPD BY GOLD CLASSIFICATION (HCC): ICD-10-CM

## 2024-04-26 DIAGNOSIS — J96.11 CHRONIC RESPIRATORY FAILURE WITH HYPOXIA (HCC): ICD-10-CM

## 2024-04-26 PROCEDURE — 71271 CT THORAX LUNG CANCER SCR C-: CPT

## 2024-04-26 PROCEDURE — 94010 BREATHING CAPACITY TEST: CPT

## 2024-04-29 DIAGNOSIS — E03.9 HYPOTHYROIDISM, UNSPECIFIED TYPE: ICD-10-CM

## 2024-04-29 RX ORDER — LEVOTHYROXINE SODIUM 0.07 MG/1
75 TABLET ORAL DAILY
Qty: 30 TABLET | Refills: 1 | Status: SHIPPED | OUTPATIENT
Start: 2024-04-29 | End: 2024-04-29 | Stop reason: SDUPTHER

## 2024-04-29 RX ORDER — LEVOTHYROXINE SODIUM 0.07 MG/1
75 TABLET ORAL DAILY
Qty: 30 TABLET | Refills: 1 | Status: SHIPPED | OUTPATIENT
Start: 2024-04-29

## 2024-05-14 NOTE — PROGRESS NOTES
1/5/22: minute lung nodules  and GGO in the lingula  Last PFT: 2024 - severe COPD  Last 6 MWT: 2/27/24- 2 L/min with exertion  Last A1AT: MM     Smoking History:  Quit smoking on November 2022 with 40 pack year history (started smoking at age 16). She previously smoked 2 PPD.  Severe opioid use disorder  She is around secondhand smoke from her partner, he goes outside to smoke.     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 (Coal, dust, asbestos, molds ie Hay)  Admits to exposure to farm dust. [place].  Admits to exposure to pets/animals at home. 1 dog   Denies exposure to tuberculosis.  Denies history of glaucoma or urinary retention.  Denies history of radiation therapy to the chest  Her father had esophageal cancer  Her mother had COPD and had a tracheostomy  Her siblings smoke with no lung disease  Reports spot of basal cell cancer on her leg - removed recently by Dr. Mcmahon      Flu vaccine: did not receive this year  RSV vaccine: uncertain   Pneumonia vaccine: not vaccinated  COVID-19 vaccine: vaccinated  Results   Lung Nodule Screening     [x] Qualifies    [] Does not qualify   [] Declined    [] Completed  40 PY history   The [employer]PSTF recommends annual screening for lung cancer with low-dose computed tomography (LDCT) in adults aged 50 to 80 years who have a 20 pack-year smoking history and currently smoke or have quit within the past 15 years. Screening should be discontinued once a person has not smoked for 15 years or develops a health problem that substantially limits life expectancy or the ability or willingness to have curative lung surgery.     NONCONTRAST SCREENING CT CHEST:     HISTORY: 43 pack year history of smoking.     TECHNIQUE:   1 mm axial imaging of the chest and upper abdomen without IV contrast.      All CT scans at this facility use

## 2024-05-15 ENCOUNTER — OFFICE VISIT (OUTPATIENT)
Dept: PULMONOLOGY | Age: 59
End: 2024-05-15
Payer: COMMERCIAL

## 2024-05-15 VITALS
HEIGHT: 65 IN | SYSTOLIC BLOOD PRESSURE: 116 MMHG | BODY MASS INDEX: 28.76 KG/M2 | WEIGHT: 172.6 LBS | HEART RATE: 70 BPM | TEMPERATURE: 98.3 F | OXYGEN SATURATION: 90 % | DIASTOLIC BLOOD PRESSURE: 68 MMHG

## 2024-05-15 DIAGNOSIS — J30.2 SEASONAL ALLERGIES: ICD-10-CM

## 2024-05-15 DIAGNOSIS — J44.9 STAGE 3 SEVERE COPD BY GOLD CLASSIFICATION (HCC): ICD-10-CM

## 2024-05-15 DIAGNOSIS — G47.33 OSA (OBSTRUCTIVE SLEEP APNEA): ICD-10-CM

## 2024-05-15 DIAGNOSIS — R04.2 HEMOPTYSIS: ICD-10-CM

## 2024-05-15 DIAGNOSIS — J96.11 CHRONIC RESPIRATORY FAILURE WITH HYPOXIA (HCC): ICD-10-CM

## 2024-05-15 DIAGNOSIS — J20.9 ACUTE BRONCHITIS, UNSPECIFIED ORGANISM: Primary | ICD-10-CM

## 2024-05-15 PROCEDURE — 99214 OFFICE O/P EST MOD 30 MIN: CPT

## 2024-05-15 RX ORDER — DOXYCYCLINE HYCLATE 100 MG/1
100 CAPSULE ORAL 2 TIMES DAILY
Qty: 14 CAPSULE | Refills: 0 | Status: SHIPPED | OUTPATIENT
Start: 2024-05-15 | End: 2024-05-22

## 2024-05-15 RX ORDER — BUDESONIDE, GLYCOPYRROLATE, AND FORMOTEROL FUMARATE 160; 9; 4.8 UG/1; UG/1; UG/1
AEROSOL, METERED RESPIRATORY (INHALATION)
Qty: 10.7 G | Refills: 11 | Status: SHIPPED | OUTPATIENT
Start: 2024-05-15

## 2024-05-15 RX ORDER — CETIRIZINE HYDROCHLORIDE 10 MG/1
10 TABLET ORAL DAILY
Qty: 90 TABLET | Refills: 3 | Status: SHIPPED | OUTPATIENT
Start: 2024-05-15

## 2024-05-15 RX ORDER — PREDNISONE 20 MG/1
40 TABLET ORAL DAILY
Qty: 10 TABLET | Refills: 0 | Status: SHIPPED | OUTPATIENT
Start: 2024-05-15 | End: 2024-05-20

## 2024-05-15 ASSESSMENT — ENCOUNTER SYMPTOMS
RHINORRHEA: 1
COUGH: 1
SHORTNESS OF BREATH: 1
CHEST TIGHTNESS: 1
SINUS PRESSURE: 0
WHEEZING: 1

## 2024-05-15 NOTE — PATIENT INSTRUCTIONS
Advised patient to seek treatment in ED for further evaluation if you produce greater than 1 cup (about 200 cc) of hemoptysis in 24 hours or develops respiratory distress

## 2024-05-21 ENCOUNTER — OFFICE VISIT (OUTPATIENT)
Dept: INTERNAL MEDICINE CLINIC | Age: 59
End: 2024-05-21
Payer: COMMERCIAL

## 2024-05-21 VITALS
SYSTOLIC BLOOD PRESSURE: 130 MMHG | HEIGHT: 65 IN | DIASTOLIC BLOOD PRESSURE: 80 MMHG | WEIGHT: 168 LBS | RESPIRATION RATE: 18 BRPM | HEART RATE: 67 BPM | BODY MASS INDEX: 27.99 KG/M2

## 2024-05-21 DIAGNOSIS — F11.20 SEVERE OPIOID USE DISORDER (HCC): Primary | ICD-10-CM

## 2024-05-21 DIAGNOSIS — F32.A ANXIETY AND DEPRESSION: ICD-10-CM

## 2024-05-21 DIAGNOSIS — F41.9 ANXIETY AND DEPRESSION: ICD-10-CM

## 2024-05-21 DIAGNOSIS — R11.0 NAUSEA: ICD-10-CM

## 2024-05-21 DIAGNOSIS — J44.9 CHRONIC OBSTRUCTIVE PULMONARY DISEASE, UNSPECIFIED COPD TYPE (HCC): ICD-10-CM

## 2024-05-21 DIAGNOSIS — E03.9 HYPOTHYROIDISM, UNSPECIFIED TYPE: ICD-10-CM

## 2024-05-21 DIAGNOSIS — I10 HYPERTENSION, UNSPECIFIED TYPE: ICD-10-CM

## 2024-05-21 DIAGNOSIS — G89.4 CHRONIC PAIN SYNDROME: ICD-10-CM

## 2024-05-21 PROCEDURE — 3075F SYST BP GE 130 - 139MM HG: CPT | Performed by: NURSE PRACTITIONER

## 2024-05-21 PROCEDURE — 3078F DIAST BP <80 MM HG: CPT | Performed by: NURSE PRACTITIONER

## 2024-05-21 PROCEDURE — 99214 OFFICE O/P EST MOD 30 MIN: CPT | Performed by: NURSE PRACTITIONER

## 2024-05-21 PROCEDURE — 96372 THER/PROPH/DIAG INJ SC/IM: CPT | Performed by: NURSE PRACTITIONER

## 2024-05-21 PROCEDURE — 80305 DRUG TEST PRSMV DIR OPT OBS: CPT | Performed by: NURSE PRACTITIONER

## 2024-05-21 RX ORDER — GABAPENTIN 600 MG/1
600 TABLET ORAL 4 TIMES DAILY
Qty: 360 TABLET | Refills: 0 | Status: SHIPPED | OUTPATIENT
Start: 2024-05-21 | End: 2024-08-19

## 2024-05-21 RX ORDER — ONDANSETRON 4 MG/1
TABLET, FILM COATED ORAL
Qty: 30 TABLET | Refills: 0 | Status: CANCELLED | OUTPATIENT
Start: 2024-05-21

## 2024-05-21 RX ORDER — TRIAMTERENE AND HYDROCHLOROTHIAZIDE 75; 50 MG/1; MG/1
1 TABLET ORAL DAILY
Qty: 30 TABLET | Refills: 1 | Status: SHIPPED | OUTPATIENT
Start: 2024-05-21

## 2024-05-21 RX ORDER — MELOXICAM 7.5 MG/1
7.5 TABLET ORAL 2 TIMES DAILY PRN
Qty: 180 TABLET | Refills: 0 | Status: SHIPPED | OUTPATIENT
Start: 2024-05-21 | End: 2024-08-19

## 2024-05-21 RX ORDER — FLUOXETINE HYDROCHLORIDE 40 MG/1
40 CAPSULE ORAL DAILY
Qty: 30 CAPSULE | Refills: 1 | Status: SHIPPED | OUTPATIENT
Start: 2024-05-21

## 2024-05-21 RX ORDER — ALBUTEROL SULFATE 90 UG/1
2 AEROSOL, METERED RESPIRATORY (INHALATION) 4 TIMES DAILY PRN
Qty: 54 G | Refills: 1 | Status: SHIPPED | OUTPATIENT
Start: 2024-05-21

## 2024-05-21 RX ORDER — LEVOTHYROXINE SODIUM 0.07 MG/1
75 TABLET ORAL DAILY
Qty: 30 TABLET | Refills: 1 | Status: SHIPPED | OUTPATIENT
Start: 2024-05-21

## 2024-05-21 RX ORDER — OLANZAPINE 10 MG/1
10 TABLET ORAL NIGHTLY
Qty: 90 TABLET | Refills: 0 | Status: SHIPPED | OUTPATIENT
Start: 2024-05-21 | End: 2024-08-19

## 2024-05-21 ASSESSMENT — ENCOUNTER SYMPTOMS
SINUS PRESSURE: 0
ABDOMINAL DISTENTION: 0
PHOTOPHOBIA: 0
SINUS PAIN: 0
RHINORRHEA: 0
BACK PAIN: 1
COUGH: 0
ABDOMINAL PAIN: 0
VOMITING: 0
SORE THROAT: 0
WHEEZING: 0
NAUSEA: 0
SHORTNESS OF BREATH: 1
DIARRHEA: 0
CONSTIPATION: 0
BLOOD IN STOOL: 0
TROUBLE SWALLOWING: 0

## 2024-05-21 NOTE — PROGRESS NOTES
Patient stated she has not consumed any Fentanyl or opiates in the last 2 weeks. Patient was educated on the risk of precipitated withdrawals symptoms that could take place with receiving the injections. Patient verbalized understanding.  Consent signed at this time.     Sublocade 100mg SQ injection given to RLQ. Patient tolerated well. No adverse reaction noticed.   
numerous and less prominent. The patchy left lower lobe groundglass opacity has resolved. No new pulmonary mass or nodule is observed. No acute intrathoracic process is visualized.   Centrilobular emphysema and chronic findings are discussed.        Liver US 1/5/23  Impression   1. Cholelithiasis without evidence of acute cholecystitis.   2. Hyperechoic lesions in the right and left hepatic lobes, possibly hemangiomas and better characterized on prior CT of the abdomen and pelvis.          She was hospitalized 12/27-1/3 for symptomatic bradycardia, carbon monoxide poisoning, altered mental status, and severe malnutrition. Is following with cardiology. Had a 30 day event monitor, and she was started on metoprolol.      No chest pain or palpitations. BP today in office 130/80 HR 67. Following with pulmonary as well. PFTs completed. Severe COPD.      Weight is up today to 168 pounds today. No family history of GI cancers. No significant nausea/vomiting. No blood in her stool. No constipation/diarrhea. Hep C ordered, not yet obtained. Liver enzymes WNL. Colonoscopy 3/15- polyps removed, hemorrhoids noted, and scattered diverticula noted. Ordered labs at last visit, not yet obtained. 5 POLYPS- LAST ONE 1 YEAR AGO       Urges and cravings controlled with Sublocade     She denies any use     Urine positive for buprenorphine and THC      Not active in counseling     She had been prescribed opioids by her PCP for several years for chronic pain issues. She has chronic back pain reportedly. Is scheduled to follow up with pain management. Mobic is helpful.      Had a basal cell carcinoma removed from left leg 8/15        GOAL:  To enhance patient recovery through the use of medication assisted treatment to improve overall quality of life.       OBJECTIVE:  Patient will abstain from the use of mood altering substances 7 out of 7 days per week.       INTERVENTION:  Patient will be maintained on Sublocade medication and will be

## 2024-06-20 ENCOUNTER — OFFICE VISIT (OUTPATIENT)
Dept: INTERNAL MEDICINE CLINIC | Age: 59
End: 2024-06-20
Payer: COMMERCIAL

## 2024-06-20 VITALS
SYSTOLIC BLOOD PRESSURE: 132 MMHG | HEIGHT: 65 IN | RESPIRATION RATE: 20 BRPM | DIASTOLIC BLOOD PRESSURE: 86 MMHG | BODY MASS INDEX: 28.32 KG/M2 | WEIGHT: 170 LBS | HEART RATE: 70 BPM

## 2024-06-20 DIAGNOSIS — F32.A ANXIETY AND DEPRESSION: ICD-10-CM

## 2024-06-20 DIAGNOSIS — E03.9 HYPOTHYROIDISM, UNSPECIFIED TYPE: ICD-10-CM

## 2024-06-20 DIAGNOSIS — F41.9 ANXIETY AND DEPRESSION: ICD-10-CM

## 2024-06-20 DIAGNOSIS — F11.20 SEVERE OPIOID USE DISORDER (HCC): Primary | ICD-10-CM

## 2024-06-20 PROCEDURE — 80305 DRUG TEST PRSMV DIR OPT OBS: CPT | Performed by: NURSE PRACTITIONER

## 2024-06-20 PROCEDURE — 99211 OFF/OP EST MAY X REQ PHY/QHP: CPT | Performed by: NURSE PRACTITIONER

## 2024-06-20 PROCEDURE — G8417 CALC BMI ABV UP PARAM F/U: HCPCS | Performed by: NURSE PRACTITIONER

## 2024-06-20 PROCEDURE — G8427 DOCREV CUR MEDS BY ELIG CLIN: HCPCS | Performed by: NURSE PRACTITIONER

## 2024-06-20 RX ORDER — LEVOTHYROXINE SODIUM 0.07 MG/1
75 TABLET ORAL DAILY
Qty: 30 TABLET | Refills: 1 | Status: SHIPPED | OUTPATIENT
Start: 2024-06-20

## 2024-06-20 RX ORDER — CITALOPRAM HYDROBROMIDE 10 MG/1
10 TABLET ORAL DAILY
Qty: 90 TABLET | Refills: 0 | Status: SHIPPED | OUTPATIENT
Start: 2024-06-20 | End: 2024-09-18

## 2024-06-20 NOTE — PROGRESS NOTES
Patient stated she has not consumed any Fentanyl or opiates in the last 2 weeks. Patient was educated on the risk of precipitated withdrawals symptoms that could take place with receiving the injections. Patient verbalized understanding.  Consent signed at this time.     Sublocade 100mg SQ injection given to LUQ. Patient tolerated well. No adverse reaction noticed.

## 2025-06-19 NOTE — ED TRIAGE NOTES
Patient  needs help with opoid addiction.  has used percocet for about 3 years ago. Currently in pain management. Sunscreen Recommendations: SPF 30 or greater (cerave, Cetaphil, La roche posay, or elta md) Detail Level: Detailed Detail Level: Generalized Detail Level: Zone

## (undated) DEVICE — GOWN,SIRUS,NON REINFRCD,LARGE,SET IN SL: Brand: MEDLINE

## (undated) DEVICE — GLOVE SURG SZ 8 L11.77IN FNGR THK9.8MIL STRW LTX POLYMER

## (undated) DEVICE — GLOVE ORANGE PI 7   MSG9070

## (undated) DEVICE — SUTURE MCRYL SZ 4-0 L18IN ABSRB UD P-3 L13MM 3/8 CIR PRIM Y494G

## (undated) DEVICE — BRACE WALKING EMBEDDED SOLE SM STD ANK ROCKER BTM INLINE

## (undated) DEVICE — PACK PROCEDURE SURG PLAS SC MIN SRHP LF

## (undated) DEVICE — SUTURE NONABSORBABLE BRAIDED 4-0 SH 30 IN BLK PERMA HND K831H

## (undated) DEVICE — BANDAGE,GAUZE,4.5"X4.1YD,STERILE,LF: Brand: MEDLINE

## (undated) DEVICE — COTTON BALL ST